# Patient Record
Sex: MALE | Race: WHITE | NOT HISPANIC OR LATINO | Employment: OTHER | URBAN - METROPOLITAN AREA
[De-identification: names, ages, dates, MRNs, and addresses within clinical notes are randomized per-mention and may not be internally consistent; named-entity substitution may affect disease eponyms.]

---

## 2017-01-10 ENCOUNTER — APPOINTMENT (OUTPATIENT)
Dept: LAB | Facility: CLINIC | Age: 67
End: 2017-01-10
Payer: MEDICARE

## 2017-01-10 DIAGNOSIS — E78.2 MIXED HYPERLIPIDEMIA: ICD-10-CM

## 2017-01-10 DIAGNOSIS — E11.65 TYPE 2 DIABETES MELLITUS WITH HYPERGLYCEMIA (HCC): ICD-10-CM

## 2017-01-10 LAB
ALBUMIN SERPL BCP-MCNC: 3.8 G/DL (ref 3.5–5)
ALP SERPL-CCNC: 81 U/L (ref 46–116)
ALT SERPL W P-5'-P-CCNC: 33 U/L (ref 12–78)
ANION GAP SERPL CALCULATED.3IONS-SCNC: 9 MMOL/L (ref 4–13)
AST SERPL W P-5'-P-CCNC: 23 U/L (ref 5–45)
BILIRUB SERPL-MCNC: 0.96 MG/DL (ref 0.2–1)
BUN SERPL-MCNC: 13 MG/DL (ref 5–25)
CALCIUM SERPL-MCNC: 9.2 MG/DL (ref 8.3–10.1)
CHLORIDE SERPL-SCNC: 105 MMOL/L (ref 100–108)
CHOLEST SERPL-MCNC: 199 MG/DL (ref 50–200)
CO2 SERPL-SCNC: 29 MMOL/L (ref 21–32)
CREAT SERPL-MCNC: 1.02 MG/DL (ref 0.6–1.3)
EST. AVERAGE GLUCOSE BLD GHB EST-MCNC: 131 MG/DL
GFR SERPL CREATININE-BSD FRML MDRD: >60 ML/MIN/1.73SQ M
GLUCOSE SERPL-MCNC: 121 MG/DL (ref 65–140)
HBA1C MFR BLD: 6.2 % (ref 4.2–6.3)
HDLC SERPL-MCNC: 52 MG/DL (ref 40–60)
LDLC SERPL CALC-MCNC: 114 MG/DL (ref 0–100)
POTASSIUM SERPL-SCNC: 3.7 MMOL/L (ref 3.5–5.3)
PROT SERPL-MCNC: 7.7 G/DL (ref 6.4–8.2)
SODIUM SERPL-SCNC: 143 MMOL/L (ref 136–145)
TRIGL SERPL-MCNC: 167 MG/DL

## 2017-01-10 PROCEDURE — 36415 COLL VENOUS BLD VENIPUNCTURE: CPT

## 2017-01-10 PROCEDURE — 80061 LIPID PANEL: CPT

## 2017-01-10 PROCEDURE — 80053 COMPREHEN METABOLIC PANEL: CPT

## 2017-01-10 PROCEDURE — 83036 HEMOGLOBIN GLYCOSYLATED A1C: CPT

## 2017-01-16 ENCOUNTER — ALLSCRIPTS OFFICE VISIT (OUTPATIENT)
Dept: OTHER | Facility: OTHER | Age: 67
End: 2017-01-16

## 2017-02-13 ENCOUNTER — GENERIC CONVERSION - ENCOUNTER (OUTPATIENT)
Dept: OTHER | Facility: OTHER | Age: 67
End: 2017-02-13

## 2017-02-13 LAB
LEFT EYE DIABETIC RETINOPATHY: NORMAL
RIGHT EYE DIABETIC RETINOPATHY: NORMAL

## 2017-04-17 ENCOUNTER — GENERIC CONVERSION - ENCOUNTER (OUTPATIENT)
Dept: OTHER | Facility: OTHER | Age: 67
End: 2017-04-17

## 2017-04-17 DIAGNOSIS — E78.2 MIXED HYPERLIPIDEMIA: ICD-10-CM

## 2017-04-19 ENCOUNTER — GENERIC CONVERSION - ENCOUNTER (OUTPATIENT)
Dept: OTHER | Facility: OTHER | Age: 67
End: 2017-04-19

## 2017-04-19 ENCOUNTER — APPOINTMENT (OUTPATIENT)
Dept: LAB | Facility: CLINIC | Age: 67
End: 2017-04-19
Payer: MEDICARE

## 2017-04-19 DIAGNOSIS — E78.2 MIXED HYPERLIPIDEMIA: ICD-10-CM

## 2017-04-19 LAB
ALBUMIN SERPL BCP-MCNC: 3.6 G/DL (ref 3.5–5)
ALP SERPL-CCNC: 71 U/L (ref 46–116)
ALT SERPL W P-5'-P-CCNC: 34 U/L (ref 12–78)
ANION GAP SERPL CALCULATED.3IONS-SCNC: 7 MMOL/L (ref 4–13)
AST SERPL W P-5'-P-CCNC: 23 U/L (ref 5–45)
BILIRUB SERPL-MCNC: 0.81 MG/DL (ref 0.2–1)
BUN SERPL-MCNC: 19 MG/DL (ref 5–25)
CALCIUM SERPL-MCNC: 8.9 MG/DL (ref 8.3–10.1)
CHLORIDE SERPL-SCNC: 103 MMOL/L (ref 100–108)
CHOLEST SERPL-MCNC: 167 MG/DL (ref 50–200)
CO2 SERPL-SCNC: 30 MMOL/L (ref 21–32)
CREAT SERPL-MCNC: 0.92 MG/DL (ref 0.6–1.3)
GFR SERPL CREATININE-BSD FRML MDRD: >60 ML/MIN/1.73SQ M
GLUCOSE P FAST SERPL-MCNC: 117 MG/DL (ref 65–99)
HDLC SERPL-MCNC: 53 MG/DL (ref 40–60)
LDLC SERPL CALC-MCNC: 88 MG/DL (ref 0–100)
POTASSIUM SERPL-SCNC: 3.9 MMOL/L (ref 3.5–5.3)
PROT SERPL-MCNC: 7.5 G/DL (ref 6.4–8.2)
SODIUM SERPL-SCNC: 140 MMOL/L (ref 136–145)
TRIGL SERPL-MCNC: 132 MG/DL

## 2017-04-19 PROCEDURE — 80061 LIPID PANEL: CPT

## 2017-04-19 PROCEDURE — 80053 COMPREHEN METABOLIC PANEL: CPT

## 2017-04-19 PROCEDURE — 36415 COLL VENOUS BLD VENIPUNCTURE: CPT

## 2017-07-04 DIAGNOSIS — E11.65 TYPE 2 DIABETES MELLITUS WITH HYPERGLYCEMIA (HCC): ICD-10-CM

## 2017-07-04 DIAGNOSIS — I10 ESSENTIAL (PRIMARY) HYPERTENSION: ICD-10-CM

## 2017-07-04 DIAGNOSIS — E66.01 MORBID (SEVERE) OBESITY DUE TO EXCESS CALORIES (HCC): ICD-10-CM

## 2017-07-04 DIAGNOSIS — E78.2 MIXED HYPERLIPIDEMIA: ICD-10-CM

## 2017-07-05 ENCOUNTER — APPOINTMENT (OUTPATIENT)
Dept: LAB | Facility: CLINIC | Age: 67
End: 2017-07-05
Payer: MEDICARE

## 2017-07-05 DIAGNOSIS — E11.65 TYPE 2 DIABETES MELLITUS WITH HYPERGLYCEMIA (HCC): ICD-10-CM

## 2017-07-05 DIAGNOSIS — E66.01 MORBID (SEVERE) OBESITY DUE TO EXCESS CALORIES (HCC): ICD-10-CM

## 2017-07-05 DIAGNOSIS — E78.2 MIXED HYPERLIPIDEMIA: ICD-10-CM

## 2017-07-05 DIAGNOSIS — I10 ESSENTIAL (PRIMARY) HYPERTENSION: ICD-10-CM

## 2017-07-05 LAB
ALBUMIN SERPL BCP-MCNC: 3.7 G/DL (ref 3.5–5)
ALP SERPL-CCNC: 78 U/L (ref 46–116)
ALT SERPL W P-5'-P-CCNC: 34 U/L (ref 12–78)
ANION GAP SERPL CALCULATED.3IONS-SCNC: 7 MMOL/L (ref 4–13)
AST SERPL W P-5'-P-CCNC: 26 U/L (ref 5–45)
BASOPHILS # BLD AUTO: 0.02 THOUSANDS/ΜL (ref 0–0.1)
BASOPHILS NFR BLD AUTO: 0 % (ref 0–1)
BILIRUB SERPL-MCNC: 1.12 MG/DL (ref 0.2–1)
BUN SERPL-MCNC: 15 MG/DL (ref 5–25)
CALCIUM SERPL-MCNC: 9 MG/DL (ref 8.3–10.1)
CHLORIDE SERPL-SCNC: 102 MMOL/L (ref 100–108)
CHOLEST SERPL-MCNC: 163 MG/DL (ref 50–200)
CO2 SERPL-SCNC: 29 MMOL/L (ref 21–32)
CREAT SERPL-MCNC: 0.96 MG/DL (ref 0.6–1.3)
CREAT UR-MCNC: 172 MG/DL
EOSINOPHIL # BLD AUTO: 0.61 THOUSAND/ΜL (ref 0–0.61)
EOSINOPHIL NFR BLD AUTO: 7 % (ref 0–6)
ERYTHROCYTE [DISTWIDTH] IN BLOOD BY AUTOMATED COUNT: 13.8 % (ref 11.6–15.1)
EST. AVERAGE GLUCOSE BLD GHB EST-MCNC: 146 MG/DL
GFR SERPL CREATININE-BSD FRML MDRD: >60 ML/MIN/1.73SQ M
GLUCOSE P FAST SERPL-MCNC: 105 MG/DL (ref 65–99)
HBA1C MFR BLD: 6.7 % (ref 4.2–6.3)
HCT VFR BLD AUTO: 45.6 % (ref 36.5–49.3)
HDLC SERPL-MCNC: 50 MG/DL (ref 40–60)
HGB BLD-MCNC: 14.9 G/DL (ref 12–17)
LDLC SERPL CALC-MCNC: 79 MG/DL (ref 0–100)
LYMPHOCYTES # BLD AUTO: 1.96 THOUSANDS/ΜL (ref 0.6–4.47)
LYMPHOCYTES NFR BLD AUTO: 22 % (ref 14–44)
MCH RBC QN AUTO: 29.4 PG (ref 26.8–34.3)
MCHC RBC AUTO-ENTMCNC: 32.7 G/DL (ref 31.4–37.4)
MCV RBC AUTO: 90 FL (ref 82–98)
MICROALBUMIN UR-MCNC: 11.1 MG/L (ref 0–20)
MICROALBUMIN/CREAT 24H UR: 6 MG/G CREATININE (ref 0–30)
MONOCYTES # BLD AUTO: 0.69 THOUSAND/ΜL (ref 0.17–1.22)
MONOCYTES NFR BLD AUTO: 8 % (ref 4–12)
NEUTROPHILS # BLD AUTO: 5.68 THOUSANDS/ΜL (ref 1.85–7.62)
NEUTS SEG NFR BLD AUTO: 63 % (ref 43–75)
NRBC BLD AUTO-RTO: 0 /100 WBCS
PLATELET # BLD AUTO: 229 THOUSANDS/UL (ref 149–390)
PMV BLD AUTO: 10.7 FL (ref 8.9–12.7)
POTASSIUM SERPL-SCNC: 3.8 MMOL/L (ref 3.5–5.3)
PROT SERPL-MCNC: 7.4 G/DL (ref 6.4–8.2)
RBC # BLD AUTO: 5.06 MILLION/UL (ref 3.88–5.62)
SODIUM SERPL-SCNC: 138 MMOL/L (ref 136–145)
TRIGL SERPL-MCNC: 172 MG/DL
WBC # BLD AUTO: 9 THOUSAND/UL (ref 4.31–10.16)

## 2017-07-05 PROCEDURE — 36415 COLL VENOUS BLD VENIPUNCTURE: CPT

## 2017-07-05 PROCEDURE — 80053 COMPREHEN METABOLIC PANEL: CPT

## 2017-07-05 PROCEDURE — 83036 HEMOGLOBIN GLYCOSYLATED A1C: CPT

## 2017-07-05 PROCEDURE — 82043 UR ALBUMIN QUANTITATIVE: CPT

## 2017-07-05 PROCEDURE — 82570 ASSAY OF URINE CREATININE: CPT

## 2017-07-05 PROCEDURE — 80061 LIPID PANEL: CPT

## 2017-07-05 PROCEDURE — 85025 COMPLETE CBC W/AUTO DIFF WBC: CPT

## 2017-07-17 ENCOUNTER — ALLSCRIPTS OFFICE VISIT (OUTPATIENT)
Dept: OTHER | Facility: OTHER | Age: 67
End: 2017-07-17

## 2017-08-11 ENCOUNTER — APPOINTMENT (OUTPATIENT)
Dept: LAB | Facility: CLINIC | Age: 67
End: 2017-08-11
Payer: MEDICARE

## 2017-08-11 ENCOUNTER — TRANSCRIBE ORDERS (OUTPATIENT)
Dept: LAB | Facility: CLINIC | Age: 67
End: 2017-08-11

## 2017-08-11 DIAGNOSIS — K76.0 FATTY METAMORPHOSIS OF LIVER: ICD-10-CM

## 2017-08-11 DIAGNOSIS — E55.9 UNSPECIFIED VITAMIN D DEFICIENCY: ICD-10-CM

## 2017-08-11 DIAGNOSIS — E55.9 UNSPECIFIED VITAMIN D DEFICIENCY: Primary | ICD-10-CM

## 2017-08-11 LAB
25(OH)D3 SERPL-MCNC: 16.1 NG/ML (ref 30–100)
ALBUMIN SERPL BCP-MCNC: 3.7 G/DL (ref 3.5–5)
ALP SERPL-CCNC: 76 U/L (ref 46–116)
ALT SERPL W P-5'-P-CCNC: 32 U/L (ref 12–78)
ANION GAP SERPL CALCULATED.3IONS-SCNC: 8 MMOL/L (ref 4–13)
AST SERPL W P-5'-P-CCNC: 24 U/L (ref 5–45)
BILIRUB SERPL-MCNC: 1.22 MG/DL (ref 0.2–1)
BUN SERPL-MCNC: 20 MG/DL (ref 5–25)
CALCIUM SERPL-MCNC: 9.4 MG/DL (ref 8.3–10.1)
CHLORIDE SERPL-SCNC: 101 MMOL/L (ref 100–108)
CO2 SERPL-SCNC: 29 MMOL/L (ref 21–32)
CREAT SERPL-MCNC: 1.06 MG/DL (ref 0.6–1.3)
GFR SERPL CREATININE-BSD FRML MDRD: 71 ML/MIN/1.73SQ M
GLUCOSE SERPL-MCNC: 118 MG/DL (ref 65–140)
POTASSIUM SERPL-SCNC: 3.8 MMOL/L (ref 3.5–5.3)
PROT SERPL-MCNC: 7.6 G/DL (ref 6.4–8.2)
SODIUM SERPL-SCNC: 138 MMOL/L (ref 136–145)

## 2017-08-11 PROCEDURE — 36415 COLL VENOUS BLD VENIPUNCTURE: CPT

## 2017-08-11 PROCEDURE — 82306 VITAMIN D 25 HYDROXY: CPT

## 2017-08-11 PROCEDURE — 80053 COMPREHEN METABOLIC PANEL: CPT

## 2017-08-15 ENCOUNTER — GENERIC CONVERSION - ENCOUNTER (OUTPATIENT)
Dept: OTHER | Facility: OTHER | Age: 67
End: 2017-08-15

## 2018-01-10 ENCOUNTER — TRANSCRIBE ORDERS (OUTPATIENT)
Dept: LAB | Facility: CLINIC | Age: 68
End: 2018-01-10

## 2018-01-10 ENCOUNTER — APPOINTMENT (OUTPATIENT)
Dept: LAB | Facility: CLINIC | Age: 68
End: 2018-01-10
Payer: MEDICARE

## 2018-01-10 DIAGNOSIS — IMO0001 UNCONTROLLED DIABETES MELLITUS TYPE 2 WITHOUT COMPLICATIONS, UNSPECIFIED LONG TERM INSULIN USE STATUS: ICD-10-CM

## 2018-01-10 DIAGNOSIS — E78.2 MIXED HYPERLIPIDEMIA: ICD-10-CM

## 2018-01-10 DIAGNOSIS — E66.01 MORBID OBESITY (HCC): ICD-10-CM

## 2018-01-10 DIAGNOSIS — I10 HYPERTENSION, ESSENTIAL: Primary | ICD-10-CM

## 2018-01-10 DIAGNOSIS — K76.0 FATTY METAMORPHOSIS OF LIVER: ICD-10-CM

## 2018-01-10 LAB
ALBUMIN SERPL BCP-MCNC: 3.6 G/DL (ref 3.5–5)
ALP SERPL-CCNC: 74 U/L (ref 46–116)
ALT SERPL W P-5'-P-CCNC: 31 U/L (ref 12–78)
ANION GAP SERPL CALCULATED.3IONS-SCNC: 9 MMOL/L (ref 4–13)
AST SERPL W P-5'-P-CCNC: 19 U/L (ref 5–45)
BILIRUB SERPL-MCNC: 0.85 MG/DL (ref 0.2–1)
BUN SERPL-MCNC: 26 MG/DL (ref 5–25)
CALCIUM SERPL-MCNC: 8.9 MG/DL (ref 8.3–10.1)
CHLORIDE SERPL-SCNC: 104 MMOL/L (ref 100–108)
CHOLEST SERPL-MCNC: 145 MG/DL (ref 50–200)
CO2 SERPL-SCNC: 27 MMOL/L (ref 21–32)
CREAT SERPL-MCNC: 1.12 MG/DL (ref 0.6–1.3)
CREAT UR-MCNC: 215 MG/DL
EST. AVERAGE GLUCOSE BLD GHB EST-MCNC: 154 MG/DL
GFR SERPL CREATININE-BSD FRML MDRD: 66 ML/MIN/1.73SQ M
GLUCOSE P FAST SERPL-MCNC: 119 MG/DL (ref 65–99)
HBA1C MFR BLD: 7 % (ref 4.2–6.3)
HDLC SERPL-MCNC: 52 MG/DL (ref 40–60)
LDLC SERPL CALC-MCNC: 71 MG/DL (ref 0–100)
MICROALBUMIN UR-MCNC: 10.1 MG/L (ref 0–20)
MICROALBUMIN/CREAT 24H UR: 5 MG/G CREATININE (ref 0–30)
POTASSIUM SERPL-SCNC: 3.6 MMOL/L (ref 3.5–5.3)
PROT SERPL-MCNC: 7.7 G/DL (ref 6.4–8.2)
SODIUM SERPL-SCNC: 140 MMOL/L (ref 136–145)
TRIGL SERPL-MCNC: 111 MG/DL
TSH SERPL DL<=0.05 MIU/L-ACNC: 0.9 UIU/ML (ref 0.36–3.74)

## 2018-01-10 PROCEDURE — 83036 HEMOGLOBIN GLYCOSYLATED A1C: CPT

## 2018-01-10 PROCEDURE — 84443 ASSAY THYROID STIM HORMONE: CPT

## 2018-01-10 PROCEDURE — 80061 LIPID PANEL: CPT

## 2018-01-10 PROCEDURE — 80053 COMPREHEN METABOLIC PANEL: CPT

## 2018-01-10 PROCEDURE — 82043 UR ALBUMIN QUANTITATIVE: CPT

## 2018-01-10 PROCEDURE — 82570 ASSAY OF URINE CREATININE: CPT

## 2018-01-10 PROCEDURE — 36415 COLL VENOUS BLD VENIPUNCTURE: CPT

## 2018-01-12 VITALS
HEIGHT: 68 IN | HEART RATE: 96 BPM | WEIGHT: 259.25 LBS | SYSTOLIC BLOOD PRESSURE: 130 MMHG | TEMPERATURE: 98.5 F | DIASTOLIC BLOOD PRESSURE: 80 MMHG | BODY MASS INDEX: 39.29 KG/M2 | RESPIRATION RATE: 16 BRPM

## 2018-01-14 VITALS
DIASTOLIC BLOOD PRESSURE: 84 MMHG | BODY MASS INDEX: 38.97 KG/M2 | HEIGHT: 68 IN | WEIGHT: 257.13 LBS | SYSTOLIC BLOOD PRESSURE: 134 MMHG | TEMPERATURE: 98.6 F | RESPIRATION RATE: 18 BRPM | HEART RATE: 88 BPM

## 2018-01-14 NOTE — RESULT NOTES
Message   Call patient  Fasting blood sugar 175  Test for diabetes Hemoglobin A1c is high at 9 7 ( N < 6 5)  Recommended to schedule appointment to discuss treatment for diabetes  Verified Results  (1) HEMOGLOBIN A1C [id number] 11:27AM Akosua Burns Order Number: [id number]   Order Number: [id number]     Test Name Result Flag Reference   HEMOGLOBIN A1C 9 7 % H 4 2-6 3   EST  AVG   GLUCOSE 232 mg/dl       (1) GLUCOSE,  FASTING [id number] 11:27AM Akosua Burns Order Number: [id number]   Order Number: [id number]     Test Name Result Flag Reference   GLUCOSE FASTING 175 mg/dL H 65-99

## 2018-01-16 ENCOUNTER — ALLSCRIPTS OFFICE VISIT (OUTPATIENT)
Dept: OTHER | Facility: OTHER | Age: 68
End: 2018-01-16

## 2018-01-16 NOTE — RESULT NOTES
Message   Call patient  Abdominal U/S showed fatty liver,  gallbladder polyp, simple L kidney cyst     Recommend to schedule gastroenterology evaluation by Dr Stanley Salgado  due to elevated liver enzymes, fatty liver  Mail order for GI consult to patient  Verified Results  * US ABDOMEN COMPLETE [id number] 08:27AM Rodrigo ALDANA Order Number: [id number]     Test Name Result Flag Reference   US ABDOMEN COMPLETE (Report)     ABDOMEN ULTRASOUND, COMPLETE     INDICATION: Abnormal serum enzymes  Hypertension  COMPARISON: None  TECHNIQUE:  Real-time ultrasound of the abdomen was performed with a curvilinear transducer with both volumetric sweeps and still imaging techniques  FINDINGS:   PANCREAS: Poorly visualized due to overlying bowel gas  Pancreatic body grossly unremarkable  AORTA AND IVC: Only segmentally visualized appearing grossly normal      LIVER:   Size: Within normal range  The liver measures 11 1 cm in the midclavicular line  Contour: Surface contour is smooth  Parenchyma: Parenchymal echotexture is heterogeneously elevated with diminished acoustic through transmission  No evidence of suspicious mass  The main portal vein is patent and hepatopetal       BILIARY:   The gallbladder is normal in caliber  No wall thickening or pericholecystic fluid  No stones identified  Layering echogenic bile/sludge  Nonmobile echogenic intraluminal focus measuring 5 mm, likely a polyp or possibly adherent congealed bile  Sonographic Junious Wixom sign is negative  No intrahepatic biliary dilatation  CBD measures 5 mm  No choledocholithiasis  KIDNEY:    Right kidney measures 11 7 x 5 2 cm  Within normal limits  Left kidney measures 10 8 x 6 1 cm  Interpolar cortical cyst measuring 1 6 cm  Within normal limits  SPLEEN:    Measures 13 x 13 3 x 4 2 cm  Within normal limits  ASCITES: None  IMPRESSION:   Fatty liver  5 mm Gallbladder polyp   Six-month follow-up advised  Simple left renal cyst    Borderline enlarged spleen  Workstation performed: [id number]     Signed by:    Jose Oconnor,    4/5/16       Plan  Elevated liver enzymes    · 2 - Carlos SHEPPARD, Carlos Betancourt  (Gastroenterology) Physician Referral  Consult  Status: Hold For -  Scheduling  Requested for: [facility] Summary provided  : Yes

## 2018-01-17 ENCOUNTER — TRANSCRIBE ORDERS (OUTPATIENT)
Dept: ADMINISTRATIVE | Facility: HOSPITAL | Age: 68
End: 2018-01-17

## 2018-01-17 DIAGNOSIS — I10 BENIGN ESSENTIAL HYPERTENSION: Primary | ICD-10-CM

## 2018-01-17 DIAGNOSIS — R06.02 EXERTIONAL SHORTNESS OF BREATH: ICD-10-CM

## 2018-01-17 DIAGNOSIS — R00.0 SINUS TACHYCARDIA: ICD-10-CM

## 2018-01-18 NOTE — RESULT NOTES
Verified Results  (1) LIPID PANEL, FASTING 19Apr2017 09:58AM Homero Walsh Order Number: [id number]     Test Name Result Flag Reference   CHOLESTEROL 167 mg/dL     HDL,DIRECT 53 mg/dL  40-60   Specimen collection should occur prior to Metamizole administration due to the potential for falsely depressed results  LDL CHOLESTEROL CALCULATED 88 mg/dL  0-100   - Patient Instructions: This is a fasting blood test  Water,black tea or black  coffee only after 9:00pm the night before test   Drink 2 glasses of water the morning of test       Triglyceride:         Normal              <150 mg/dl       Borderline High    150-199 mg/dl       High               200-499 mg/dl       Very High          >499 mg/dl  Cholesterol:         Desirable        <200 mg/dl      Borderline High  200-239 mg/dl      High             >239 mg/dl  HDL Cholesterol:        High    >59 mg/dL      Low     <41 mg/dL  LDL CALCULATED:    This screening LDL is a calculated result  It does not have the accuracy of the Direct Measured LDL in the monitoring of patients with hyperlipidemia and/or statin therapy  Direct Measure LDL (CMQ031) must be ordered separately in these patients  TRIGLYCERIDES 132 mg/dL  <=150   Specimen collection should occur prior to N-Acetylcysteine or Metamizole administration due to the potential for falsely depressed results         Signatures  Electronically signed by : JERRI Carrillo ; Apr 19 2017  1:08PM EST                       (Author)

## 2018-01-22 VITALS
RESPIRATION RATE: 16 BRPM | BODY MASS INDEX: 39.71 KG/M2 | HEIGHT: 68 IN | DIASTOLIC BLOOD PRESSURE: 90 MMHG | SYSTOLIC BLOOD PRESSURE: 140 MMHG | TEMPERATURE: 97.7 F | OXYGEN SATURATION: 94 % | HEART RATE: 100 BPM | WEIGHT: 262 LBS

## 2018-02-06 ENCOUNTER — OFFICE VISIT (OUTPATIENT)
Dept: CARDIOLOGY CLINIC | Facility: CLINIC | Age: 68
End: 2018-02-06
Payer: MEDICARE

## 2018-02-06 VITALS
HEIGHT: 70 IN | DIASTOLIC BLOOD PRESSURE: 80 MMHG | BODY MASS INDEX: 37.22 KG/M2 | HEART RATE: 84 BPM | SYSTOLIC BLOOD PRESSURE: 142 MMHG | WEIGHT: 260 LBS

## 2018-02-06 DIAGNOSIS — E78.00 HYPERCHOLESTEROLEMIA: ICD-10-CM

## 2018-02-06 DIAGNOSIS — R94.31 ABNORMAL EKG: Primary | ICD-10-CM

## 2018-02-06 DIAGNOSIS — R00.0 SINUS TACHYCARDIA: ICD-10-CM

## 2018-02-06 DIAGNOSIS — I10 HYPERTENSION, ESSENTIAL, BENIGN: ICD-10-CM

## 2018-02-06 DIAGNOSIS — I10 BENIGN ESSENTIAL HYPERTENSION: ICD-10-CM

## 2018-02-06 DIAGNOSIS — I49.3 PVC (PREMATURE VENTRICULAR CONTRACTION): ICD-10-CM

## 2018-02-06 DIAGNOSIS — R06.02 EXERTIONAL SHORTNESS OF BREATH: ICD-10-CM

## 2018-02-06 PROCEDURE — 99203 OFFICE O/P NEW LOW 30 MIN: CPT | Performed by: INTERNAL MEDICINE

## 2018-02-06 RX ORDER — LATANOPROST 50 UG/ML
SOLUTION/ DROPS OPHTHALMIC
COMMUNITY
Start: 2016-06-10

## 2018-02-06 RX ORDER — ASPIRIN 325 MG
1 TABLET ORAL DAILY
COMMUNITY
Start: 2017-07-17

## 2018-02-06 RX ORDER — VITAMIN E 268 MG
CAPSULE ORAL DAILY
COMMUNITY
Start: 2016-06-10

## 2018-02-06 RX ORDER — CHLORAL HYDRATE 500 MG
CAPSULE ORAL 2 TIMES DAILY
COMMUNITY
Start: 2016-04-01

## 2018-02-06 RX ORDER — AMLODIPINE BESYLATE 5 MG/1
1 TABLET ORAL DAILY
COMMUNITY
Start: 2016-04-01 | End: 2018-03-16 | Stop reason: SDUPTHER

## 2018-02-06 RX ORDER — ATORVASTATIN CALCIUM 20 MG/1
1 TABLET, FILM COATED ORAL DAILY
COMMUNITY
Start: 2017-01-27 | End: 2018-03-19 | Stop reason: SDUPTHER

## 2018-02-06 RX ORDER — LISINOPRIL AND HYDROCHLOROTHIAZIDE 25; 20 MG/1; MG/1
1 TABLET ORAL DAILY
COMMUNITY
Start: 2016-05-27 | End: 2019-01-18 | Stop reason: SDUPTHER

## 2018-02-06 NOTE — PROGRESS NOTES
Cardiology Follow Up    Germaine Lassiter  1950  [id number]  [address] [facility]  [address] [place] [address] [address]    1  Abnormal EKG  Echo complete with contrast if indicated    NM myocardial perfusion spect (rx stress and/or rest)   2  Hypertension, essential, benign     3  Hypercholesterolemia  NM myocardial perfusion spect (rx stress and/or rest)   4  PVC (premature ventricular contraction)  Echo complete with contrast if indicated   5  Benign essential hypertension  Ambulatory referral to Cardiology   6  Exertional shortness of breath  Ambulatory referral to Cardiology    Echo complete with contrast if indicated    NM myocardial perfusion spect (rx stress and/or rest)   7  Sinus tachycardia  Ambulatory referral to Cardiology    Echo complete with contrast if indicated       Interval History:  Cardiology consultation, kindly referred by primary physician for evaluation of dyspnea  Pleasant 75-year-old male who has no previous cardiac history  The patient appears to be minimizing some of his symptoms, but he has been getting dyspneic with mild to moderate efforts  Although he feels that he is not limited  He has had no chest discomfort  He denies any orthopnea or PND  He has had diabetes for couple years as well as longstanding hypertension on medications mostly well controlled  Recent evaluation revealed that he was somewhat tachycardia, beta-blocker was added to his medical regimen  The patient admits to be deconditioned  He is obese as well and he does not exercise regularly  An EKG was performed, personally reviewed, rhythm was sinus tachycardia, with premature ventricular contractions noted  There was left axis deviation versus inferior infarct, and diffuse nonspecific ST segment changes noted      Patient Active Problem List   Diagnosis    Abnormal EKG    Hypertension, essential, benign    Hypercholesterolemia    PVC (premature ventricular contraction)     No past medical history on file  Social History     Social History    Marital status: /Civil Union     Spouse name: N/A    Number of children: N/A    Years of education: N/A     Occupational History    Not on file  Social History Main Topics    Smoking status: Never Smoker    Smokeless tobacco: Never Used    Alcohol use Not on file    Drug use: Unknown    Sexual activity: Not on file     Other Topics Concern    Not on file     Social History Narrative    No narrative on file      No family history on file  No past surgical history on file  Current Outpatient Prescriptions:     amLODIPine (NORVASC) 5 mg tablet, Take 1 tablet by mouth daily, Disp: , Rfl:     aspirin 325 mg tablet, Take 1 tablet by mouth every 4 (four) hours as needed, Disp: , Rfl:     atorvastatin (LIPITOR) 20 mg tablet, Take 1 tablet by mouth daily, Disp: , Rfl:     cholecalciferol (VITAMIN D3) 1,000 units tablet, Take 1 tablet by mouth daily, Disp: , Rfl:     latanoprost (XALATAN) 0 005 % ophthalmic solution, Apply to eye, Disp: , Rfl:     lisinopril-hydrochlorothiazide (PRINZIDE,ZESTORETIC) 20-25 MG per tablet, Take 1 tablet by mouth daily, Disp: , Rfl:     metFORMIN (GLUCOPHAGE) 1000 MG tablet, Take 1 tablet by mouth 2 (two) times a day, Disp: , Rfl:     metoprolol tartrate (LOPRESSOR) 25 mg tablet, Take 0 5 tablets by mouth 2 (two) times a day, Disp: , Rfl:     Multiple Vitamins-Minerals (MULTIVITAL-M) TABS, Take 1 tablet by mouth daily, Disp: , Rfl:     Omega-3 Fatty Acids (FISH OIL) 1,000 mg, Take by mouth 2 (two) times a day, Disp: , Rfl:     vitamin E, tocopherol, 400 units capsule, Take by mouth daily, Disp: , Rfl:   No Known Allergies    Labs:  Transcribe Orders on 01/10/2018   Component Date Value    Creatinine, Ur 01/10/2018 215 0     Microalbum  ,U,Random 01/10/2018 10 1     Microalb Creat Ratio 01/10/2018 5     Sodium 01/10/2018 140     Potassium 01/10/2018 3 6     Chloride 01/10/2018 104     CO2 01/10/2018 27     Anion Gap 01/10/2018 9     BUN 01/10/2018 26*    Creatinine 01/10/2018 1 12     Glucose, Fasting 01/10/2018 119*    Calcium 01/10/2018 8 9     AST 01/10/2018 19     ALT 01/10/2018 31     Alkaline Phosphatase 01/10/2018 74     Total Protein 01/10/2018 7 7     Albumin 01/10/2018 3 6     Total Bilirubin 01/10/2018 0 85     eGFR 01/10/2018 66     TSH 3RD GENERATON 01/10/2018 0 903     Cholesterol 01/10/2018 145     Triglycerides 01/10/2018 111     HDL, Direct 01/10/2018 52     LDL Calculated 01/10/2018 71     Hemoglobin A1C 01/10/2018 7 0*    EAG 01/10/2018 154    Appointment on 08/11/2017   Component Date Value    Sodium 08/11/2017 138     Potassium 08/11/2017 3 8     Chloride 08/11/2017 101     CO2 08/11/2017 29     Anion Gap 08/11/2017 8     BUN 08/11/2017 20     Creatinine 08/11/2017 1 06     Glucose 08/11/2017 118     Calcium 08/11/2017 9 4     AST 08/11/2017 24     ALT 08/11/2017 32     Alkaline Phosphatase 08/11/2017 76     Total Protein 08/11/2017 7 6     Albumin 08/11/2017 3 7     Total Bilirubin 08/11/2017 1 22*    eGFR 08/11/2017 71     Vit D, 25-Hydroxy 08/11/2017 16 1*     Imaging: No results found  Review of Systems:  Review of Systems   Constitutional: Negative for activity change, appetite change, chills, diaphoresis, fever and unexpected weight change  HENT: Negative for hearing loss, nosebleeds and trouble swallowing  Eyes: Positive for visual disturbance  Respiratory: Positive for cough and shortness of breath  Negative for apnea, choking, chest tightness, wheezing and stridor  Cardiovascular: Negative for chest pain, palpitations and leg swelling  Gastrointestinal: Negative for abdominal distention, abdominal pain, anal bleeding, blood in stool, constipation, diarrhea, nausea, rectal pain and vomiting     Endocrine: Negative for cold intolerance and heat intolerance  Genitourinary: Negative for dysuria, flank pain, frequency and hematuria  Musculoskeletal: Positive for arthralgias and back pain  Negative for gait problem, joint swelling, myalgias and neck pain  Skin: Negative for color change, pallor, rash and wound  Allergic/Immunologic: Negative for environmental allergies, food allergies and immunocompromised state  Neurological: Negative for dizziness, tremors, seizures, syncope, facial asymmetry, speech difficulty, weakness, light-headedness, numbness and headaches  Hematological: Does not bruise/bleed easily  Psychiatric/Behavioral: Negative for agitation, behavioral problems and confusion  Physical Exam:  Physical Exam   Constitutional: He is oriented to person, place, and time  He appears well-developed  No distress  Obese   HENT:   Head: Normocephalic  Eyes: Pupils are equal, round, and reactive to light  No scleral icterus  Neck: No JVD present  No tracheal deviation present  No thyromegaly present  Cardiovascular: Normal rate, regular rhythm, normal heart sounds and intact distal pulses  Exam reveals no gallop and no friction rub  No murmur heard  Pulmonary/Chest: Effort normal and breath sounds normal  No stridor  No respiratory distress  He has no wheezes  He has no rales  He exhibits no tenderness  Abdominal: Soft  Bowel sounds are normal  He exhibits no distension and no mass  There is no tenderness  There is no rebound and no guarding  Neurological: He is alert and oriented to person, place, and time  Skin: Skin is warm and dry  No rash noted  He is not diaphoretic  No erythema  No pallor  Psychiatric: He has a normal mood and affect  Judgment normal        Discussion/Summary:  Exertional dyspnea with abnormal EKG, possibly deconditioning  Favor noninvasive evaluation  Will do a stress test and an echocardiogram   Further recommendations pending results of the testing

## 2018-02-15 LAB
LEFT EYE DIABETIC RETINOPATHY: NORMAL
RIGHT EYE DIABETIC RETINOPATHY: NORMAL

## 2018-03-16 ENCOUNTER — TELEPHONE (OUTPATIENT)
Dept: FAMILY MEDICINE CLINIC | Facility: CLINIC | Age: 68
End: 2018-03-16

## 2018-03-16 DIAGNOSIS — I10 ESSENTIAL HYPERTENSION: Primary | ICD-10-CM

## 2018-03-16 RX ORDER — AMLODIPINE BESYLATE 5 MG/1
5 TABLET ORAL DAILY
Qty: 90 TABLET | Refills: 3 | Status: SHIPPED | OUTPATIENT
Start: 2018-03-16 | End: 2019-04-29 | Stop reason: SDUPTHER

## 2018-03-19 DIAGNOSIS — E78.2 MIXED HYPERLIPIDEMIA: Primary | ICD-10-CM

## 2018-03-19 RX ORDER — ATORVASTATIN CALCIUM 20 MG/1
TABLET, FILM COATED ORAL
Qty: 90 TABLET | Refills: 3 | Status: SHIPPED | OUTPATIENT
Start: 2018-03-19 | End: 2019-03-12 | Stop reason: SDUPTHER

## 2018-04-05 ENCOUNTER — HOSPITAL ENCOUNTER (OUTPATIENT)
Dept: NON INVASIVE DIAGNOSTICS | Facility: CLINIC | Age: 68
Discharge: HOME | End: 2018-04-05
Payer: MEDICARE

## 2018-04-05 DIAGNOSIS — I49.3 PVC (PREMATURE VENTRICULAR CONTRACTION): ICD-10-CM

## 2018-04-05 DIAGNOSIS — R06.02 EXERTIONAL SHORTNESS OF BREATH: ICD-10-CM

## 2018-04-05 DIAGNOSIS — R94.31 ABNORMAL EKG: ICD-10-CM

## 2018-04-05 DIAGNOSIS — R00.0 SINUS TACHYCARDIA: ICD-10-CM

## 2018-04-05 DIAGNOSIS — E78.00 HYPERCHOLESTEROLEMIA: ICD-10-CM

## 2018-04-05 LAB
ARRHY DURING EX: NORMAL
CHEST PAIN STATEMENT: NORMAL
MAX DIASTOLIC BP: 88 MMHG
MAX HEART RATE: 134 BPM
MAX PREDICTED HEART RATE: 150 BPM
MAX. SYSTOLIC BP: 160 MMHG
PROTOCOL NAME: NORMAL
REASON FOR TERMINATION: NORMAL
TARGET HR FORMULA: NORMAL
TEST INDICATION: NORMAL
TIME IN EXERCISE PHASE: NORMAL

## 2018-04-05 PROCEDURE — 93017 CV STRESS TEST TRACING ONLY: CPT

## 2018-04-05 PROCEDURE — 93306 TTE W/DOPPLER COMPLETE: CPT | Performed by: INTERNAL MEDICINE

## 2018-04-05 PROCEDURE — 93306 TTE W/DOPPLER COMPLETE: CPT

## 2018-04-05 PROCEDURE — A9502 TC99M TETROFOSMIN: HCPCS

## 2018-04-05 PROCEDURE — 78452 HT MUSCLE IMAGE SPECT MULT: CPT

## 2018-04-30 DIAGNOSIS — E11.65 TYPE 2 DIABETES MELLITUS WITH HYPERGLYCEMIA, WITHOUT LONG-TERM CURRENT USE OF INSULIN (HCC): Primary | ICD-10-CM

## 2018-06-12 DIAGNOSIS — E11.65 TYPE 2 DIABETES MELLITUS WITH HYPERGLYCEMIA, WITHOUT LONG-TERM CURRENT USE OF INSULIN (HCC): ICD-10-CM

## 2018-06-12 RX ORDER — LANCETS
EACH MISCELLANEOUS 3 TIMES DAILY
Qty: 300 EACH | Refills: 3 | Status: SHIPPED | OUTPATIENT
Start: 2018-06-12 | End: 2021-10-05

## 2018-06-12 NOTE — TELEPHONE ENCOUNTER
Patient needs a refill of metFORMIN 1000m 180 tabs and 1 touch ultra blue test strips -[facility] to the [address] in [facility]  Patient can be contacted at [phone]

## 2018-07-05 DIAGNOSIS — E78.2 MIXED HYPERLIPIDEMIA: ICD-10-CM

## 2018-07-05 DIAGNOSIS — E11.65 TYPE 2 DIABETES MELLITUS WITH HYPERGLYCEMIA (HCC): ICD-10-CM

## 2018-07-05 DIAGNOSIS — I10 ESSENTIAL (PRIMARY) HYPERTENSION: ICD-10-CM

## 2018-07-05 DIAGNOSIS — E66.01 MORBID (SEVERE) OBESITY DUE TO EXCESS CALORIES (HCC): ICD-10-CM

## 2018-07-13 ENCOUNTER — APPOINTMENT (OUTPATIENT)
Dept: LAB | Facility: CLINIC | Age: 68
End: 2018-07-13
Payer: MEDICARE

## 2018-07-13 DIAGNOSIS — E11.65 TYPE 2 DIABETES MELLITUS WITH HYPERGLYCEMIA (HCC): ICD-10-CM

## 2018-07-13 DIAGNOSIS — I10 ESSENTIAL (PRIMARY) HYPERTENSION: ICD-10-CM

## 2018-07-13 DIAGNOSIS — E66.01 MORBID (SEVERE) OBESITY DUE TO EXCESS CALORIES (HCC): ICD-10-CM

## 2018-07-13 DIAGNOSIS — E78.2 MIXED HYPERLIPIDEMIA: ICD-10-CM

## 2018-07-13 LAB
ALBUMIN SERPL BCP-MCNC: 3.8 G/DL (ref 3.5–5)
ALP SERPL-CCNC: 85 U/L (ref 46–116)
ALT SERPL W P-5'-P-CCNC: 41 U/L (ref 12–78)
ANION GAP SERPL CALCULATED.3IONS-SCNC: 6 MMOL/L (ref 4–13)
AST SERPL W P-5'-P-CCNC: 30 U/L (ref 5–45)
BILIRUB SERPL-MCNC: 1.29 MG/DL (ref 0.2–1)
BUN SERPL-MCNC: 21 MG/DL (ref 5–25)
CALCIUM SERPL-MCNC: 9.3 MG/DL (ref 8.3–10.1)
CHLORIDE SERPL-SCNC: 101 MMOL/L (ref 100–108)
CHOLEST SERPL-MCNC: 149 MG/DL (ref 50–200)
CO2 SERPL-SCNC: 27 MMOL/L (ref 21–32)
CREAT SERPL-MCNC: 1.17 MG/DL (ref 0.6–1.3)
CREAT UR-MCNC: 159 MG/DL
EST. AVERAGE GLUCOSE BLD GHB EST-MCNC: 151 MG/DL
GFR SERPL CREATININE-BSD FRML MDRD: 63 ML/MIN/1.73SQ M
GLUCOSE P FAST SERPL-MCNC: 120 MG/DL (ref 65–99)
HBA1C MFR BLD: 6.9 % (ref 4.2–6.3)
HDLC SERPL-MCNC: 39 MG/DL (ref 40–60)
LDLC SERPL CALC-MCNC: 68 MG/DL (ref 0–100)
MICROALBUMIN UR-MCNC: 13.4 MG/L (ref 0–20)
MICROALBUMIN/CREAT 24H UR: 8 MG/G CREATININE (ref 0–30)
NONHDLC SERPL-MCNC: 110 MG/DL
POTASSIUM SERPL-SCNC: 3.5 MMOL/L (ref 3.5–5.3)
PROT SERPL-MCNC: 7.9 G/DL (ref 6.4–8.2)
SODIUM SERPL-SCNC: 134 MMOL/L (ref 136–145)
TRIGL SERPL-MCNC: 208 MG/DL

## 2018-07-13 PROCEDURE — 80061 LIPID PANEL: CPT

## 2018-07-13 PROCEDURE — 83036 HEMOGLOBIN GLYCOSYLATED A1C: CPT

## 2018-07-13 PROCEDURE — 82570 ASSAY OF URINE CREATININE: CPT

## 2018-07-13 PROCEDURE — 80053 COMPREHEN METABOLIC PANEL: CPT

## 2018-07-13 PROCEDURE — 82043 UR ALBUMIN QUANTITATIVE: CPT

## 2018-07-13 PROCEDURE — 36415 COLL VENOUS BLD VENIPUNCTURE: CPT

## 2018-07-15 PROBLEM — E55.9 VITAMIN D DEFICIENCY: Status: ACTIVE | Noted: 2018-01-16

## 2018-07-15 PROBLEM — R06.02 EXERTIONAL SHORTNESS OF BREATH: Status: ACTIVE | Noted: 2018-01-16

## 2018-07-17 ENCOUNTER — HOSPITAL ENCOUNTER (OUTPATIENT)
Dept: RADIOLOGY | Facility: HOSPITAL | Age: 68
Discharge: HOME | End: 2018-07-17
Payer: MEDICARE

## 2018-07-17 ENCOUNTER — TRANSCRIBE ORDERS (OUTPATIENT)
Dept: ADMINISTRATIVE | Facility: HOSPITAL | Age: 68
End: 2018-07-17

## 2018-07-17 ENCOUNTER — OFFICE VISIT (OUTPATIENT)
Dept: FAMILY MEDICINE CLINIC | Facility: CLINIC | Age: 68
End: 2018-07-17
Payer: MEDICARE

## 2018-07-17 VITALS
RESPIRATION RATE: 18 BRPM | HEART RATE: 92 BPM | WEIGHT: 256 LBS | OXYGEN SATURATION: 91 % | DIASTOLIC BLOOD PRESSURE: 74 MMHG | HEIGHT: 67 IN | TEMPERATURE: 98.1 F | BODY MASS INDEX: 40.18 KG/M2 | SYSTOLIC BLOOD PRESSURE: 128 MMHG

## 2018-07-17 DIAGNOSIS — K76.0 FATTY LIVER: ICD-10-CM

## 2018-07-17 DIAGNOSIS — Z12.11 SCREENING FOR COLON CANCER: ICD-10-CM

## 2018-07-17 DIAGNOSIS — R06.02 EXERTIONAL SHORTNESS OF BREATH: ICD-10-CM

## 2018-07-17 DIAGNOSIS — E11.65 TYPE 2 DIABETES MELLITUS WITH HYPERGLYCEMIA, WITHOUT LONG-TERM CURRENT USE OF INSULIN (HCC): ICD-10-CM

## 2018-07-17 DIAGNOSIS — Z00.00 MEDICARE ANNUAL WELLNESS VISIT, SUBSEQUENT: ICD-10-CM

## 2018-07-17 DIAGNOSIS — E78.2 MIXED HYPERLIPIDEMIA: ICD-10-CM

## 2018-07-17 DIAGNOSIS — R09.02 HYPOXIA: ICD-10-CM

## 2018-07-17 DIAGNOSIS — R09.02 HYPOXEMIA: Primary | ICD-10-CM

## 2018-07-17 DIAGNOSIS — I10 BENIGN ESSENTIAL HYPERTENSION: Primary | ICD-10-CM

## 2018-07-17 DIAGNOSIS — E66.01 MORBID OBESITY DUE TO EXCESS CALORIES (HCC): ICD-10-CM

## 2018-07-17 PROCEDURE — G0439 PPPS, SUBSEQ VISIT: HCPCS | Performed by: FAMILY MEDICINE

## 2018-07-17 PROCEDURE — 71275 CT ANGIOGRAPHY CHEST: CPT

## 2018-07-17 PROCEDURE — 99214 OFFICE O/P EST MOD 30 MIN: CPT | Performed by: FAMILY MEDICINE

## 2018-07-17 RX ADMIN — IOHEXOL 89 ML: 350 INJECTION, SOLUTION INTRAVENOUS at 17:49

## 2018-07-17 NOTE — PROGRESS NOTES
Chief Complaint   Patient presents with   [facility] Wellness Visit     Annual wellness visit   Follow-up     6 month follow up  Health Maintenance   Topic Date Due    Medicare Annual Wellness Visit (AWV)  1950    CRC Screening: Colonoscopy  1950    DTaP,Tdap,and Td Vaccines (1 - Tdap) 01/10/1969    PNEUMOCOCCAL POLYSACCHARIDE VACCINE AGE 72 AND OVER  01/10/2013    Diabetic Foot Exam  07/17/2018    INFLUENZA VACCINE  09/01/2018    HEMOGLOBIN A1C  01/13/2019    GLAUCOMA SCREENING 65 + YR  02/15/2019    DM Eye Exam  02/15/2019    URINE MICROALBUMIN  07/13/2019    Fall Risk  07/17/2019    Depression Screening PHQ-9  07/17/2019    Hepatitis C Screening  Completed     Assessment/Plan:    Benign essential hypertension  Blood pressure is stable  Continue Lisinopril/HCTZ 20/25 mg one tablet daily, Amlodipine 5 mg daily  Follow a low-sodium diet  Consider changing ACE  to  ARB due to dry cough  Type 2 diabetes mellitus with hyperglycemia (HCC)  Lab Results   Component Value Date    HGBA1C 6 9 (H) 07/13/2018       No results for input(s): POCGLU in the last 72 hours  Blood Sugar Average: Last 72 hrs:    Continue Metformin 1000 mg 1 tablet twice daily  Patient will check with pharmacist if he can change his glucometer due to giving high blood sugar readings at home  Check eye exam by ophthalmologist Theresa Fraser annually  Recommended podiatry evaluation  Exertional shortness of breath  Patient was seen by cardiologist Dr Magdiel Santa  in February 2018  Nuclear stress test performed in April 2018 was negative for cardiac ischemia  Patient c/o dry cough  Pulse ox is 90-91 % at rest   Will order CTA of the chest to rule out pulmonary embolism, lung pathology  Consider referral to pulmonology for further evaluation  Mixed hyperlipidemia  Continue Atorvastatin 20 mg daily  Recommended to follow a low cholesterol, low-fat diet, regular exercise       Fatty liver  Take Vit E 400 IU daily  Follow a low fat diet, lose weight  Follow -up with gastroenterologist Dr Lerma Son  Morbid obesity due to excess calories ([facility] [address] )  Encouraged weight reduction  HM: patient refusing colonoscopy  Recommended Tdap vaccination  Discussed a new vaccine for shingles  Schedule follow-up office visit in 6 months  Check labs prior to next visit  Diagnoses and all orders for this visit:    Benign essential hypertension  -     Comprehensive metabolic panel; Future    Type 2 diabetes mellitus with hyperglycemia, without long-term current use of insulin (HCC)  -     Comprehensive metabolic panel; Future  -     Hemoglobin A1C; Future    Exertional shortness of breath  -     CTA chest pe study; Future    Hypoxia  -     CTA chest pe study; Future    Mixed hyperlipidemia  -     Lipid panel; Future    Fatty liver    Screening for colon cancer  -     Ambulatory referral to Gastroenterology; Future    Morbid obesity due to excess calories ([facility] )    Medicare annual wellness visit, subsequent          Subjective:      Patient ID: Doug Hodges is a 77 y o  male  HPI     Patient presents for 6 month follow-up visit for HTN, Type 2 DM, Hyperlipidemia, Morbid obesity, fatty liver  Reviewed current medications, blood work results from  7/13/18  Hb A1c 6 9, fasting blood sugar 120, potassium 3 5, cholesterol 149, triglycerides 208, HDL 39, LDL 68  Patient was seen by cardiologist Dr Sonya Munguia in 2/18  for evaluation of exertional shortness of breath  Nuclear stress test done in April 2018 was negative for cardiac ischemia  Patient still c/o some exertional shortness of breath, dry   cough for the past 4 weeks  Pulse ox on room air at rest 90-91 %  Denies fever, chills, night sweats  Denies tobacco use  Type 2 DM - currently taking Metformin 1000 mg twice daily with meals  He checks blood sugar at home 2-3  times per week   Patient is not sure if his glucometer working properly because he gets blood sugar readings in 170 -  180's  Hemoglobin A1c 6 9 with recent blood test     Hyperlipidemia -currently taking Atorvastatin 20 mg daily  Denies side effects from statin therapy  HTN - patient takes Lisinopril/HCTZ 20/25 mg 1 tablet daily, Amlodipine 5 mg daily  Blood pressure remained stable  Denies chest pain, dizziness, heart palpitations  Patient has been followed by gastroenterologist   Dr Carlota Goodman every 6 months for fatty liver  He takes Vit E 400 IU daily  Patient tries to eat healthy, lost 6 lb since last visit in 1/18  He does not exercise on a regular basis  Patient states that he had eye exam done by   Dr Theresa Fraser this year  Patient has not been seen by podiatrist   C/o occasional numbness  in both feet  No balance problems  No falls  Refusing colonoscopy  Prevnar 13 done in 12/15  The following portions of the patient's history were reviewed and updated as appropriate: current medications, past family history, past medical history, past social history, past surgical history and problem list     Review of Systems   Constitutional: Negative for activity change, appetite change, chills, fatigue and fever  HENT: Negative for congestion, ear pain, hearing loss, sore throat, tinnitus and trouble swallowing  Eyes: Negative for pain, discharge, redness, itching and visual disturbance  Respiratory: Positive for cough (dry cough) and shortness of breath (exertional SOB)  Negative for chest tightness and wheezing  Cardiovascular: Negative for chest pain, palpitations and leg swelling  Gastrointestinal: Negative for abdominal pain, blood in stool, constipation, diarrhea, nausea and vomiting  Genitourinary: Negative for difficulty urinating, dysuria, flank pain and hematuria  Nocturia   Musculoskeletal: Negative for arthralgias, gait problem, joint swelling and myalgias  Skin: Negative for rash and wound     Neurological: Positive for numbness (in feet)  Negative for dizziness, syncope and headaches  Hematological: Negative  Psychiatric/Behavioral: Negative  Objective:      /74 (BP Location: Left arm, Patient Position: Sitting, Cuff Size: Large)   Pulse 92   Temp 98 1 °F (36 7 °C) (Tympanic)   Resp 18   Ht 5' 7" (1 702 m)   Wt 116 kg (256 lb)   SpO2 91%   BMI 40 10 kg/m²          Physical Exam   Constitutional: He is oriented to person, place, and time  He appears well-developed and well-nourished  Morbidly obese   HENT:   Head: Normocephalic and atraumatic  Right Ear: External ear normal    Left Ear: External ear normal    Mouth/Throat: Oropharynx is clear and moist    Eyes: Conjunctivae are normal  Pupils are equal, round, and reactive to light  Neck: Normal range of motion  Neck supple  No JVD present  Cardiovascular: Normal rate, regular rhythm and normal heart sounds  No murmur heard  Sinus tachycardia  No carotid bruits BL  No BL LE edema   Pulmonary/Chest: Effort normal  No respiratory distress  He has no wheezes  Few crackles at R lower lung field   Abdominal: Soft  Bowel sounds are normal  There is no tenderness  Musculoskeletal: Normal range of motion  He exhibits no edema, tenderness or deformity  Neurological: He is alert and oriented to person, place, and time  Coordination normal    Skin: Skin is warm and dry  Psychiatric: He has a normal mood and affect  His behavior is normal    Nursing note and vitals reviewed

## 2018-07-17 NOTE — PROGRESS NOTES
Chief Complaint   Patient presents with   [facility] Wellness Visit     Annual wellness visit   Follow-up     6 month follow up  Assessment and Plan:  Problem List Items Addressed This Visit        Digestive    Fatty liver       Endocrine    Type 2 diabetes mellitus with hyperglycemia ([address] )       Cardiovascular and Mediastinum    Benign essential hypertension - Primary       Other    Mixed hyperlipidemia    Morbid obesity due to excess calories [facility])      Other Visit Diagnoses     Screening for colon cancer            Health Maintenance Due   Topic Date Due    Medicare Annual Wellness Visit (AWV)  1950    CRC Screening: Colonoscopy  1950    DTaP,Tdap,and Td Vaccines (1 - Tdap) 01/10/1969    Fall Risk  01/10/2013    PNEUMOCOCCAL POLYSACCHARIDE VACCINE AGE 72 AND OVER  01/10/2013    Diabetic Foot Exam  07/17/2018         HPI:  Todd Tong is a 77 y o  male here for his Subsequent Wellness Visit      Patient Active Problem List   Diagnosis    Abnormal EKG    Benign essential hypertension    Hypercholesterolemia    PVC (premature ventricular contraction)    Elevated liver enzymes    Exertional shortness of breath    Fatty liver    Glaucoma    Mixed hyperlipidemia    Morbid obesity due to excess calories (HCC)    Sinus tachycardia    Type 2 diabetes mellitus with hyperglycemia (HCC)    Vitamin D deficiency     Past Medical History:   Diagnosis Date    Cataract, left     last assessed [id number]    Epistaxis     last assessed [id number]    Exertional shortness of breath     last assessed 16jan2018    IFG (impaired fasting glucose)     last assessed 01Apr2016     Past Surgical History:   Procedure Laterality Date    CATARACT EXTRACTION Left 07/01/2016    L eye cataract surgery - 7/16     Family History   Problem Relation Age of Onset    Stomach cancer Father      History   Smoking Status    Never Smoker   Smokeless Tobacco    Never Used     History   Alcohol Use No      History Drug use: Unknown         Current Outpatient Prescriptions   Medication Sig Dispense Refill    amLODIPine (NORVASC) 5 mg tablet Take 1 tablet (5 mg total) by mouth daily 90 tablet 3    aspirin 325 mg tablet Take 1 tablet by mouth every 4 (four) hours as needed      atorvastatin (LIPITOR) 20 mg tablet TAKE ONE TABLET BY MOUTH ONCE DAILY 90 tablet 3    cholecalciferol (VITAMIN D3) 1,000 units tablet Take 1 tablet by mouth daily      glucose blood (ONE TOUCH ULTRA TEST) test strip Test sugar 2-3 times daily 300 each 3    latanoprost (XALATAN) 0 005 % ophthalmic solution Apply to eye      [employer] UNISTIK II LANCETS MISC by Does not apply route 3 (three) times a day for 90 days 300 each 3    lisinopril-hydrochlorothiazide (PRINZIDE,ZESTORETIC) 20-25 MG per tablet Take 1 tablet by mouth daily      metFORMIN (GLUCOPHAGE) 1000 MG tablet Take 1 tablet (1,000 mg total) by mouth 2 (two) times a day for 90 days 180 tablet 3    metoprolol tartrate (LOPRESSOR) 25 mg tablet Take 0 5 tablets by mouth 2 (two) times a day      Multiple Vitamins-Minerals (MULTIVITAL-M) TABS Take 1 tablet by mouth daily      Omega-3 Fatty Acids (FISH OIL) 1,000 mg Take by mouth 2 (two) times a day      vitamin E, tocopherol, 400 units capsule Take by mouth daily       No current facility-administered medications for this visit        No Known Allergies  Immunization History   Administered Date(s) Administered    Influenza Split High Dose Preservative Free IM 12/04/2015, 01/16/2017, 01/16/2018    Pneumococcal Conjugate 13-Valent 12/04/2015       Patient Care Team:  Jeremias Shaw MD as PCP - General    Medicare Screening Tests and Risk Assessments:  AWV Clinical     ISAR:   Previous hospitalizations?:  No       Once in a Lifetime Medicare Screening:   AAA screening performed? (if performed, please add date to Health Maintenance):  No       Medicare Screening Tests and Risk Assessment:   AAA Risk Assessment     Tobacco use (males only):   No    Family history of AAA:  No   Osteoporosis Risk Assessment    :  Yes    Age over 48:  Yes    HIV Risk Assessment        Drug and Alcohol Use:   Tobacco use    Cigarettes:  never smoker    Smokeless:  never used smokeless tobacco    Tobacco use duration    Tobacco Cessation Readiness    Alcohol use    Alcohol use:  rare use    Alcohol Treatment Readiness   Illicit Drug Use    Drug use:  never        Diet & Exercise:   Diet   How many servings a day of the following:   Fruits and Vegetables:  3-4 Meat:  1-2   Whole Grains:  1    Coffee:  1    Exercise    Do you currently exercise?:  yes    Frequency:  occasional       Cognitive Impairment Screening:   Depression screening preformed:  Yes     PHQ-9 Depression scale score:  2   Cognitive Impairment Screening        Functional Ability/Level of Safety:   Hearing    Hearing aid:  No    Hearing Impairment Assessment    Current Activities    Status:  unlimited ADL's, unlimited social activities, unlimited driving   Help needed with the folllowing:    ADL    Fall Risk   Have you fallen in the last 12 months?:  No    How many times?:  0    Injury History       Home Safety:   Home Safety Risk Factors       Advanced Directives:   Advanced Directives    Living Will:  No Durable POA for healthcare:  No   Advanced directive:  No    Patient's End of Life Decisions        Urinary Incontinence:       Glaucoma:            Provider Screening     Preventative Screening/Counseling:   Cardiovascular Screening/Counseling:   (Labs Q5 years, EKG optional one-time)         Diabetes Screening/Counseling:   (2 tests/year if Pre-Diabetes or 1 test/year if no Diabetes)         Colorectal Cancer Screening/Counseling:   (FOBT Q1 yr; Flex Sig Q4 yrs or Q10 yrs after Screening Colonoscopy; Screening Colonoscpy Q2 yrs High Risk or Q10 yrs Low Risk; Barium Enema Q2 yrs High Risk or Q4 yrs Low Risk)         Prostate Cancer Screening/Counseling:   (Annual)          Breast Cancer Screening/Counseling:   (Baseline Age 28 - 43; Annual Age 36+)         Cervical Cancer Screening/Counseling:   (Annual for High Risk or Childbearing Age with Abnormal Pap in Last 3 yrs; Every 2 all others)         Osteoporosis Screening/Counseling:   (Every 2 Yrs if at risk or more if medically necessary)         AAA Screening/Counseling:   (Once per Lifetime with risk factors)    Family History of AAA:  No  Tobacco use (males only):  No         Glaucoma Screening/Counseling:   (Annual)         HIV Screening/Counseling:   (Voluntary; Once annually for high risk OR 3 times for Pregnancy at diagnosis of IUP; 3rd trimester; and at Labor         Hepatitis C Screening:             Immunizations: Other Preventative Couseling (Non-Medicare Wellness Visit Required):       Referrals (Non-Medicare Wellness Visit Required):       Medical Equipment/Suppliers:           No exam data present    Physical Exam :  General ROS: negative for CP  C/o exertional SOB, dry cough  Physical Exam     Lung exam: crackles at R lower lung base  MMSE: 27/30  No falls

## 2018-07-18 DIAGNOSIS — R05.8 DRY COUGH: ICD-10-CM

## 2018-07-18 DIAGNOSIS — R93.89 ABNORMAL CT OF THE CHEST: Primary | ICD-10-CM

## 2018-07-18 DIAGNOSIS — R06.02 SOB (SHORTNESS OF BREATH) ON EXERTION: ICD-10-CM

## 2018-07-18 RX ORDER — FUROSEMIDE 20 MG/1
TABLET ORAL
Qty: 30 TABLET | Refills: 0 | Status: SHIPPED | OUTPATIENT
Start: 2018-07-18 | End: 2018-08-16 | Stop reason: SDUPTHER

## 2018-07-18 RX ORDER — AZITHROMYCIN 250 MG/1
TABLET, FILM COATED ORAL
Qty: 6 TABLET | Refills: 0 | Status: SHIPPED | OUTPATIENT
Start: 2018-07-18 | End: 2018-07-22

## 2018-07-18 NOTE — ASSESSMENT & PLAN NOTE
Take Vit E 400 IU daily  Follow a low fat diet, lose weight  Follow -up with gastroenterologist Dr Kathy Hay

## 2018-07-18 NOTE — ASSESSMENT & PLAN NOTE
Lab Results   Component Value Date    HGBA1C 6 9 (H) 07/13/2018       No results for input(s): POCGLU in the last 72 hours  Blood Sugar Average: Last 72 hrs:    Continue Metformin 1000 mg 1 tablet twice daily  Patient will check with pharmacist if he can change his glucometer due to giving high blood sugar readings at home  Check eye exam by ophthalmologist Tyra Brannon annually  Recommended podiatry evaluation

## 2018-07-18 NOTE — ASSESSMENT & PLAN NOTE
Blood pressure is stable  Continue Lisinopril/HCTZ 20/25 mg one tablet daily, Amlodipine 5 mg daily  Follow a low-sodium diet  Consider changing ACE  to  ARB due to dry cough

## 2018-07-18 NOTE — ASSESSMENT & PLAN NOTE
Continue Atorvastatin 20 mg daily  Recommended to follow a low cholesterol, low-fat diet, regular exercise

## 2018-07-18 NOTE — ASSESSMENT & PLAN NOTE
Patient was seen by cardiologist Dr Phil Yu  in February 2018  Nuclear stress test performed in April 2018 was negative for cardiac ischemia  Patient c/o dry cough  Pulse ox is 90-91 % at rest   Will order CTA of the chest to rule out pulmonary embolism, lung pathology  Consider referral to pulmonology for further evaluation

## 2018-07-19 ENCOUNTER — OFFICE VISIT (OUTPATIENT)
Dept: PULMONOLOGY | Facility: CLINIC | Age: 68
End: 2018-07-19
Payer: MEDICARE

## 2018-07-19 VITALS
TEMPERATURE: 99.2 F | WEIGHT: 260 LBS | SYSTOLIC BLOOD PRESSURE: 122 MMHG | HEART RATE: 96 BPM | OXYGEN SATURATION: 92 % | RESPIRATION RATE: 19 BRPM | HEIGHT: 70 IN | BODY MASS INDEX: 37.22 KG/M2 | DIASTOLIC BLOOD PRESSURE: 72 MMHG

## 2018-07-19 DIAGNOSIS — J67.9 HYPERSENSITIVITY PNEUMONITIS (HCC): Primary | ICD-10-CM

## 2018-07-19 DIAGNOSIS — R93.89 ABNORMAL CT OF THE CHEST: ICD-10-CM

## 2018-07-19 PROCEDURE — 99205 OFFICE O/P NEW HI 60 MIN: CPT | Performed by: INTERNAL MEDICINE

## 2018-07-19 PROCEDURE — 94618 PULMONARY STRESS TESTING: CPT | Performed by: INTERNAL MEDICINE

## 2018-07-19 NOTE — PROGRESS NOTES
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  I have explained to the patient the nature of his disease and the potential of progression if he continues get exposed to the bird antigens  The patient seems to have difficulty to convince the wife to get rid of the bird  The patient desaturated on the 6 minutes walk test   However he requested if he can do without it for now and I agreed provided that he cleans his home environment  I have not started him on steroids at this point in time since he is fairly asymptomatic except for mild to moderate dyspnea on exertion and hypoxemia on exertion  I have ordered a hypersensitivity pneumonitis profile  I will see the patient in 3 months  History of Present Illness   HPI:  Gloria Falcon is a 77 y o  male who is here for evaluation of abnormal CT scan of the chest   Three days ago the patient went to see his family doctor for a wellness visit  On the review of systems the patient stated that he is short of breath on exertion  Also he developed occasional cough  A CT scan of the chest was ordered  The result was abnormal   For this reason the patient was referred to me for further evaluation  The patient stated that he has shortness of breath on exertion noticed about a a year ago  He noticed that mainly when he is mowing the lawn    It has not changed significantly  The patient denies any significant cough  Denies any chest pain or palpitations  No wheezing  Denies nocturnal symptoms  Denies daytime hypersomnolence  Review of systems:  12 point review of systems was completed and was otherwise negative except as listed in HPI  Historical Information   Past Medical History:   Diagnosis Date    Cataract, left     last assessed [id number]    Epistaxis     last assessed 16Jan2017    Exertional shortness of breath     last assessed 16jan2018    IFG (impaired fasting glucose)     last assessed 01Apr2016     Past Surgical History:   Procedure Laterality Date    CATARACT EXTRACTION Left 07/01/2016    L eye cataract surgery - 7/16     Family History   Problem Relation Age of Onset    Stomach cancer Father     No Known Problems Mother        Family History:  Noncontributory  Social History:  The patient is  and lives with his wife  His wife has multiple chronic medical problems  He is a lifelong nonsmoker  Denies alcohol use  He was a   He has a cockatiel for the last 10 years  His wife is the one who cares for the bird      Meds/Allergies   No current facility-administered medications for this visit  (Not in a hospital admission)  No Known Allergies    Vitals: Blood pressure 122/72, pulse 96, temperature 99 2 °F (37 3 °C), temperature source Tympanic, resp  rate 19, height 5' 10" (1 778 m), weight 118 kg (260 lb), SpO2 92 %  ,      Physical exam:        Head/eyes:    Normocephalic, without obvious abnormality, atraumatic,         PERRL, extraocular muscles intact, no scleral icterus    Nose:   Nares normal, septum midline, mucosa normal, no drainage    or sinus tenderness   Throat:   Moist mucous membranes, no thrush   Neck:   Supple, trachea midline, no adenopathy; no carotid    bruit or JVD   Lungs:   Faint posterior crackles  No wheezing          Heart:    Regular rate and rhythm, S1 and S2 normal, no murmur, rub or gallop   Abdomen:     Soft, non-tender, bowel sounds active all four quadrants,     no masses, no organomegaly   Extremities:   Extremities normal, atraumatic, no cyanosis or edema   Skin:   Warm, dry, turgor normal, no rashes or lesions   Neurologic:   CNII-XII intact, normal strength, non-focal             Labs:  CBC from a year ago showed mildly elevated eosinophils  Imaging and other studies: I have personally reviewed pertinent films in PACS CT scan of the chest from 2 days ago is reviewed on the [facility] system and it showed diffuse bilateral ground-glass opacities  A 6 minutes walk test was done today in the office and the patient started with a heart rate of 107 beats per minute and O2 saturation 93%  After 1 minutes the O2 saturation dropped to 86%  The patient was placed on 2 L oxygen and continued walking  The oxygen was titrated up to 3 L to maintain O2 saturation above 90%  At the end of the test the heart rate went up to 130 beats per minute  Patient walked total of 252 meters          Kamron Moon MD

## 2018-07-20 ENCOUNTER — APPOINTMENT (OUTPATIENT)
Dept: LAB | Facility: CLINIC | Age: 68
End: 2018-07-20
Payer: MEDICARE

## 2018-07-20 DIAGNOSIS — J67.9 HYPERSENSITIVITY PNEUMONITIS (HCC): ICD-10-CM

## 2018-07-20 PROCEDURE — 36415 COLL VENOUS BLD VENIPUNCTURE: CPT

## 2018-07-20 PROCEDURE — 86606 ASPERGILLUS ANTIBODY: CPT

## 2018-07-20 PROCEDURE — 86671 FUNGUS NES ANTIBODY: CPT

## 2018-07-20 PROCEDURE — 86602 ANTINOMYCES ANTIBODY: CPT

## 2018-07-20 PROCEDURE — 86331 IMMUNODIFFUSION OUCHTERLONY: CPT

## 2018-07-25 LAB
A FUMIGATUS1 AB SER QL ID: NEGATIVE
A PULLULANS AB SER QL: NEGATIVE
LACEYELLA SACCHARI AB SER QL: NEGATIVE
PIGEON SERUM AB QL ID: NEGATIVE
S RECTIVIRGULA AB SER QL ID: NEGATIVE
T VULGARIS AB SER QL ID: NEGATIVE

## 2018-07-26 ENCOUNTER — TELEPHONE (OUTPATIENT)
Dept: FAMILY MEDICINE CLINIC | Facility: CLINIC | Age: 68
End: 2018-07-26

## 2018-07-26 NOTE — TELEPHONE ENCOUNTER
Patient phoned to give an update-he finished the antibiotic that was prescribed for him and is feeling better

## 2018-08-16 DIAGNOSIS — R06.02 SOB (SHORTNESS OF BREATH) ON EXERTION: ICD-10-CM

## 2018-08-17 RX ORDER — FUROSEMIDE 20 MG/1
20 TABLET ORAL DAILY
Qty: 90 TABLET | Refills: 3 | Status: SHIPPED | OUTPATIENT
Start: 2018-08-17 | End: 2019-01-15 | Stop reason: ALTCHOICE

## 2018-10-23 ENCOUNTER — OFFICE VISIT (OUTPATIENT)
Dept: PULMONOLOGY | Facility: CLINIC | Age: 68
End: 2018-10-23
Payer: MEDICARE

## 2018-10-23 ENCOUNTER — TELEPHONE (OUTPATIENT)
Dept: PULMONOLOGY | Facility: CLINIC | Age: 68
End: 2018-10-23

## 2018-10-23 VITALS
HEIGHT: 70 IN | OXYGEN SATURATION: 91 % | SYSTOLIC BLOOD PRESSURE: 140 MMHG | BODY MASS INDEX: 37.45 KG/M2 | DIASTOLIC BLOOD PRESSURE: 70 MMHG | TEMPERATURE: 98.4 F | WEIGHT: 261.6 LBS | HEART RATE: 100 BPM

## 2018-10-23 DIAGNOSIS — R06.00 DOE (DYSPNEA ON EXERTION): Primary | ICD-10-CM

## 2018-10-23 DIAGNOSIS — J96.11 CHRONIC HYPOXEMIC RESPIRATORY FAILURE (HCC): ICD-10-CM

## 2018-10-23 DIAGNOSIS — Z23 ENCOUNTER FOR IMMUNIZATION: ICD-10-CM

## 2018-10-23 DIAGNOSIS — J67.9 HYPERSENSITIVITY PNEUMONITIS (HCC): ICD-10-CM

## 2018-10-23 PROCEDURE — 99215 OFFICE O/P EST HI 40 MIN: CPT | Performed by: INTERNAL MEDICINE

## 2018-10-23 PROCEDURE — G0008 ADMIN INFLUENZA VIRUS VAC: HCPCS | Performed by: INTERNAL MEDICINE

## 2018-10-23 PROCEDURE — 94618 PULMONARY STRESS TESTING: CPT | Performed by: INTERNAL MEDICINE

## 2018-10-23 PROCEDURE — 90662 IIV NO PRSV INCREASED AG IM: CPT | Performed by: INTERNAL MEDICINE

## 2018-10-23 RX ORDER — PREDNISONE 20 MG/1
40 TABLET ORAL DAILY
Qty: 40 TABLET | Refills: 1 | Status: SHIPPED | OUTPATIENT
Start: 2018-10-23 | End: 2019-01-15 | Stop reason: ALTCHOICE

## 2018-10-23 NOTE — TELEPHONE ENCOUNTER
Emailed all documentation needed to contact the patient for his new Oxygen order   [phone] Pt's cell for Tian'jennifer badillo

## 2018-10-23 NOTE — PROGRESS NOTES
Office Progress Note - Pulmonary    Lisette Kirk 77 y o  male MRN: [id number]    Encounter: [id number]      Assessment:  · Hypersensitivity pneumonitis  · Chronic hypoxemic respiratory failure  · Dyspnea on exertion  Plan:   · Chest x-ray PA and lateral   · 6 min walk test   · Home oxygen  · Prednisone 40 mg p o  daily for 2 weeks  Then we will taper the prednisone by 10 mg every week if his condition improves  · Influenza vaccine  · Follow-up in 2 weeks  Discussion:   The patient's dyspnea on exertion is still persistent  Unfortunately he desaturated on the 6 min walk test and requires 3 L to maintain O2 saturation above 88%  Even though the hypersensitivity provided is negative a cannot find another explanation for his diffuse airspace disease other than hypersensitivity pneumonitis given the exposure to the cockatiel  I have ordered a PA and lateral chest x-ray  I have started him on prednisone 40 mg p o  daily once a day  Based on his response to the prednisone will taper the dose  I have ordered the oxygen with portability  He received the influenza vaccine  I will see him in 2 weeks in a follow-up visit  Subjective: The patient is here for a follow-up visit  He still has shortness of breath on exertion  He stated that the cockatiel was removed from his house a week after I saw last visit which was 3 months ago  He denies any cough, wheezing or sputum production  Denies any chest pain or palpitations  Review of systems:  A 12 point system review is done and aside from what is stated above the rest of the review of systems is negative  Family history and social history are reviewed  Medications list is reviewed  Vitals: Blood pressure 140/70, pulse 100, temperature 98 4 °F (36 9 °C), height 5' 10" (1 778 m), weight 119 kg (261 lb 9 6 oz), SpO2 91 %  ,     Physical Exam  Gen: Awake, alert, oriented x 3, no acute distress  HEENT: Mucous membranes moist, no oral lesions, no thrush  NECK: No accessory muscle use, JVP not elevated  Cardiac: Regular, single S1, single S2, no murmurs, no rubs, no gallops  Lungs:   Decreased breath sounds  No wheezing or rhonchi  Abdomen: normoactive bowel sounds, soft nontender, nondistended, no rebound or rigidity, no guarding  Extremities: no cyanosis, no clubbing, no edema  Neuro:  Grossly nonfocal   Skin:  No rash  Hypersensitivity profile was negative  A 6 min walk test was done today in the office and the patient started with a heart rate of 90 beats per minute and O2 saturation 93%  After 2 min his O2 saturation dropped to 86% and the heart rate increased to 107 beats per minute  The patient was placed on 2 L of oxygen and the test was continued  The oxygen need to be titrated up to 3 L to maintain O2 saturation above 88%

## 2018-10-25 ENCOUNTER — HOSPITAL ENCOUNTER (OUTPATIENT)
Dept: RADIOLOGY | Facility: HOSPITAL | Age: 68
Discharge: HOME | End: 2018-10-25
Attending: INTERNAL MEDICINE
Payer: MEDICARE

## 2018-10-25 ENCOUNTER — TRANSCRIBE ORDERS (OUTPATIENT)
Dept: RADIOLOGY | Facility: HOSPITAL | Age: 68
End: 2018-10-25

## 2018-10-25 DIAGNOSIS — J67.9 HYPERSENSITIVITY PNEUMONITIS (HCC): ICD-10-CM

## 2018-10-25 DIAGNOSIS — E55.9 VITAMIN D DEFICIENCY: Primary | ICD-10-CM

## 2018-10-25 PROCEDURE — 71046 X-RAY EXAM CHEST 2 VIEWS: CPT

## 2018-11-06 ENCOUNTER — OFFICE VISIT (OUTPATIENT)
Dept: PULMONOLOGY | Facility: CLINIC | Age: 68
End: 2018-11-06
Payer: MEDICARE

## 2018-11-06 VITALS
TEMPERATURE: 97.9 F | HEART RATE: 87 BPM | OXYGEN SATURATION: 94 % | BODY MASS INDEX: 36.82 KG/M2 | HEIGHT: 70 IN | WEIGHT: 257.2 LBS | DIASTOLIC BLOOD PRESSURE: 70 MMHG | SYSTOLIC BLOOD PRESSURE: 138 MMHG

## 2018-11-06 DIAGNOSIS — R06.00 DOE (DYSPNEA ON EXERTION): ICD-10-CM

## 2018-11-06 DIAGNOSIS — J67.9 HYPERSENSITIVITY PNEUMONITIS (HCC): Primary | ICD-10-CM

## 2018-11-06 PROCEDURE — 99214 OFFICE O/P EST MOD 30 MIN: CPT | Performed by: INTERNAL MEDICINE

## 2018-11-06 RX ORDER — PREDNISONE 10 MG/1
10 TABLET ORAL DAILY
Qty: 15 TABLET | Refills: 0 | Status: SHIPPED | OUTPATIENT
Start: 2018-11-06 | End: 2019-01-15 | Stop reason: ALTCHOICE

## 2018-11-06 NOTE — PROGRESS NOTES
Office Progress Note - Pulmonary    Jeffery Roque 77 y o  male MRN: [id number]    Encounter: [id number]      Assessment:  · Hypersensitivity pneumonitis  · Dyspnea on exertion  · Chronic hypoxemic respiratory failure  Plan:   · Decrease prednisone to 30 mg p o  daily starting tomorrow and taper by 10 mg every week  · We will contact the company regarding the home oxygen  · Follow-up in 4 weeks  Discussion:   The patient's cough has markedly improved as well as his dyspnea  He still has dyspnea on exertion which could be due to his obesity and deconditioning  His chest x-ray today showed improvement in the bilateral pulmonary infiltrates  I have decreased his prednisone to 30 mg p o  daily starting tomorrow  I will taper the dose by 10 mg every week  We will contact the company to rectify the problem of him not getting the oxygen at home  I will see him in 4 weeks in a follow-up visit  Subjective: The patient is here for a follow-up visit  His cough has markedly improved  His dyspnea on exertion has improved by at least 50%  He still has dyspnea on exertion  Denies any chest pain or palpitations  Denies chest wheezing  He has taken prednisone 40 mg once a day  Review of systems:  A 12 point system review is done and aside from what is stated above the rest of the review of systems is negative  Family history and social history are reviewed  Medications list is reviewed  Vitals: Blood pressure 138/70, pulse 87, temperature 97 9 °F (36 6 °C), temperature source Tympanic, height 5' 10" (1 778 m), weight 117 kg (257 lb 3 2 oz), SpO2 94 %  ,     Physical Exam  Gen: Awake, alert, oriented x 3, no acute distress  HEENT: Mucous membranes moist, no oral lesions, no thrush  NECK: No accessory muscle use, JVP not elevated  Cardiac: Regular, single S1, single S2, no murmurs, no rubs, no gallops  Lungs:   Clear breath sounds    Abdomen: normoactive bowel sounds, soft nontender, nondistended, no rebound or rigidity, no guarding  Extremities: no cyanosis, no clubbing, no edema  Neuro:  Grossly nonfocal   Skin:  No rash  Chest x-ray is reviewed on the [employer] system and compared to the CT scan of the chest done earlier  The bilateral airspace disease has markedly improved

## 2018-11-09 ENCOUNTER — APPOINTMENT (OUTPATIENT)
Dept: LAB | Facility: CLINIC | Age: 68
End: 2018-11-09
Payer: MEDICARE

## 2018-11-09 DIAGNOSIS — E55.9 VITAMIN D DEFICIENCY: ICD-10-CM

## 2018-11-09 LAB — 25(OH)D3 SERPL-MCNC: 29.8 NG/ML (ref 30–100)

## 2018-11-09 PROCEDURE — 36415 COLL VENOUS BLD VENIPUNCTURE: CPT

## 2018-11-09 PROCEDURE — 82306 VITAMIN D 25 HYDROXY: CPT

## 2018-12-20 ENCOUNTER — OFFICE VISIT (OUTPATIENT)
Dept: PULMONOLOGY | Facility: CLINIC | Age: 68
End: 2018-12-20
Payer: MEDICARE

## 2018-12-20 VITALS
HEIGHT: 70 IN | RESPIRATION RATE: 19 BRPM | BODY MASS INDEX: 36.65 KG/M2 | SYSTOLIC BLOOD PRESSURE: 118 MMHG | DIASTOLIC BLOOD PRESSURE: 68 MMHG | WEIGHT: 256 LBS | HEART RATE: 96 BPM | OXYGEN SATURATION: 92 % | TEMPERATURE: 99.2 F

## 2018-12-20 DIAGNOSIS — J67.9 HYPERSENSITIVITY PNEUMONIA (HCC): ICD-10-CM

## 2018-12-20 DIAGNOSIS — R06.00 DOE (DYSPNEA ON EXERTION): Primary | ICD-10-CM

## 2018-12-20 PROCEDURE — 99214 OFFICE O/P EST MOD 30 MIN: CPT | Performed by: INTERNAL MEDICINE

## 2018-12-20 NOTE — PROGRESS NOTES
Office Progress Note - Pulmonary    Rachel Regalado 77 y o  male MRN: [id number]    Encounter: [id number]      Assessment:  · Dyspnea on exertion  · Hypersensitivity pneumonitis  Improved  · Diastolic dysfunction  · Obesity  · Chronic hypoxemic respiratory failure  Plan:   · Regular exercise  · Weight loss  · Discontinue home oxygen  · Follow-up as needed  Discussion:   The patient's dyspnea on exertion is multifactorial   It is due to diastolic dysfunction, obesity and physical deconditioning  He still has chronic hypoxemic respiratory failure when he is exerting himself  The patient is adamant that he does not want to use the oxygen outdoors  He will continue to use it at home when he is watching TV  I have explained to the patient that he does not needed at rest but rather needs it more when he is exerting himself to help his dyspnea on exertion  Patient does not want to use it specially outside the house  For this reason I have discontinue the home oxygen  I have encouraged the patient to try to lose weight and take regular walks to improve stamina  I have not scheduled him for another appointment however I will be happy to see him in the future if the need arises  Subjective: The patient is here for a follow-up visit  His breathing has improved  He still has dyspnea on exertion which is chronic  It seems that the patient is not bothered by this symptom  He has occasional cough  No sputum production  Denies any chest pain  He has not been using his oxygen outdoors or with activities  He is mostly using it at home when he is sitting down  Review of systems:  A 12 point system review is done and aside from what is stated above the rest of the review of systems is negative  Family history and social history are reviewed  Medications list is reviewed        Vitals: Blood pressure 118/68, pulse 96, temperature 99 2 °F (37 3 °C), temperature source Tympanic, resp  rate 19, height 5' 10" (1 778 m), weight 116 kg (256 lb), SpO2 92 %  ,     Physical Exam  Gen: Awake, alert, oriented x 3, no acute distress  HEENT: Mucous membranes moist, no oral lesions, no thrush  NECK: No accessory muscle use, JVP not elevated  Cardiac: Regular, single S1, single S2, no murmurs, no rubs, no gallops  Lungs:   Bilateral basal crackles  Abdomen: normoactive bowel sounds, soft nontender, nondistended, no rebound or rigidity, no guarding  Extremities: no cyanosis, no clubbing, no edema  Neuro:  Grossly nonfocal   Skin:  No rash

## 2019-01-09 ENCOUNTER — APPOINTMENT (OUTPATIENT)
Dept: LAB | Facility: CLINIC | Age: 69
End: 2019-01-09
Payer: MEDICARE

## 2019-01-09 DIAGNOSIS — E11.65 TYPE 2 DIABETES MELLITUS WITH HYPERGLYCEMIA, WITHOUT LONG-TERM CURRENT USE OF INSULIN (HCC): ICD-10-CM

## 2019-01-09 DIAGNOSIS — I10 BENIGN ESSENTIAL HYPERTENSION: ICD-10-CM

## 2019-01-09 DIAGNOSIS — E78.2 MIXED HYPERLIPIDEMIA: ICD-10-CM

## 2019-01-09 LAB
ALBUMIN SERPL BCP-MCNC: 3.7 G/DL (ref 3.5–5)
ALP SERPL-CCNC: 97 U/L (ref 46–116)
ALT SERPL W P-5'-P-CCNC: 22 U/L (ref 12–78)
ANION GAP SERPL CALCULATED.3IONS-SCNC: 11 MMOL/L (ref 4–13)
AST SERPL W P-5'-P-CCNC: 19 U/L (ref 5–45)
BILIRUB SERPL-MCNC: 1.18 MG/DL (ref 0.2–1)
BUN SERPL-MCNC: 15 MG/DL (ref 5–25)
CALCIUM SERPL-MCNC: 8.7 MG/DL (ref 8.3–10.1)
CHLORIDE SERPL-SCNC: 102 MMOL/L (ref 100–108)
CHOLEST SERPL-MCNC: 162 MG/DL (ref 50–200)
CO2 SERPL-SCNC: 26 MMOL/L (ref 21–32)
CREAT SERPL-MCNC: 1.07 MG/DL (ref 0.6–1.3)
EST. AVERAGE GLUCOSE BLD GHB EST-MCNC: 197 MG/DL
GFR SERPL CREATININE-BSD FRML MDRD: 70 ML/MIN/1.73SQ M
GLUCOSE P FAST SERPL-MCNC: 140 MG/DL (ref 65–99)
HBA1C MFR BLD: 8.5 % (ref 4.2–6.3)
HDLC SERPL-MCNC: 40 MG/DL (ref 40–60)
LDLC SERPL CALC-MCNC: 71 MG/DL (ref 0–100)
NONHDLC SERPL-MCNC: 122 MG/DL
POTASSIUM SERPL-SCNC: 3.4 MMOL/L (ref 3.5–5.3)
PROT SERPL-MCNC: 7.6 G/DL (ref 6.4–8.2)
SODIUM SERPL-SCNC: 139 MMOL/L (ref 136–145)
TRIGL SERPL-MCNC: 254 MG/DL

## 2019-01-09 PROCEDURE — 36415 COLL VENOUS BLD VENIPUNCTURE: CPT

## 2019-01-09 PROCEDURE — 80061 LIPID PANEL: CPT

## 2019-01-09 PROCEDURE — 80053 COMPREHEN METABOLIC PANEL: CPT

## 2019-01-09 PROCEDURE — 83036 HEMOGLOBIN GLYCOSYLATED A1C: CPT

## 2019-01-15 ENCOUNTER — OFFICE VISIT (OUTPATIENT)
Dept: FAMILY MEDICINE CLINIC | Facility: CLINIC | Age: 69
End: 2019-01-15
Payer: MEDICARE

## 2019-01-15 VITALS
TEMPERATURE: 98.9 F | WEIGHT: 251.8 LBS | BODY MASS INDEX: 36.13 KG/M2 | OXYGEN SATURATION: 94 % | RESPIRATION RATE: 20 BRPM | SYSTOLIC BLOOD PRESSURE: 110 MMHG | HEART RATE: 100 BPM | DIASTOLIC BLOOD PRESSURE: 70 MMHG

## 2019-01-15 DIAGNOSIS — I10 BENIGN ESSENTIAL HYPERTENSION: Primary | ICD-10-CM

## 2019-01-15 DIAGNOSIS — R06.02 EXERTIONAL SHORTNESS OF BREATH: ICD-10-CM

## 2019-01-15 DIAGNOSIS — E66.01 MORBID OBESITY DUE TO EXCESS CALORIES (HCC): ICD-10-CM

## 2019-01-15 DIAGNOSIS — K76.0 FATTY LIVER: ICD-10-CM

## 2019-01-15 DIAGNOSIS — E11.42 DIABETIC POLYNEUROPATHY ASSOCIATED WITH TYPE 2 DIABETES MELLITUS (HCC): ICD-10-CM

## 2019-01-15 DIAGNOSIS — E11.65 TYPE 2 DIABETES MELLITUS WITH HYPERGLYCEMIA, WITHOUT LONG-TERM CURRENT USE OF INSULIN (HCC): ICD-10-CM

## 2019-01-15 DIAGNOSIS — E78.2 MIXED HYPERLIPIDEMIA: ICD-10-CM

## 2019-01-15 PROBLEM — E11.40 DIABETIC NEUROPATHY ASSOCIATED WITH TYPE 2 DIABETES MELLITUS (HCC): Status: ACTIVE | Noted: 2019-01-15

## 2019-01-15 PROCEDURE — 99215 OFFICE O/P EST HI 40 MIN: CPT | Performed by: FAMILY MEDICINE

## 2019-01-15 NOTE — PROGRESS NOTES
Chief Complaint   Patient presents with    Follow-up     6 month follow up     Health Maintenance   Topic Date Due    CRC Screening: Colonoscopy  1950    DTaP,Tdap,and Td Vaccines (1 - Tdap) 01/10/1969    Pneumococcal PPSV23/PCV13 65+ Years / Low and Medium Risk (2 of 2 - PPSV23) 12/04/2016    Diabetic Foot Exam  07/17/2018    DM Eye Exam  02/15/2019    HEMOGLOBIN A1C  07/09/2019    URINE MICROALBUMIN  07/13/2019    Fall Risk  07/17/2019    Depression Screening PHQ  07/17/2019    Medicare Annual Wellness Visit (AWV)  07/17/2019    Hepatitis C Screening  Completed    INFLUENZA VACCINE  Completed     Assessment/Plan:    Benign essential hypertension  Patient reports low blood pressure readings at home  C/o feeling lightheaded with positional changes  Recommended to d/c asix 20 mg daily  Continue Lisinopril/HCTZ 20/25  mg 1 tablet daily, Amlodipine 5 mg daily  Monitor blood pressure at home and call with BP log in 2 weeks  Will adjust blood pressure medications accordingly  Type 2 diabetes mellitus with hyperglycemia (HCC)  Lab Results   Component Value Date    HGBA1C 8 5 (H) 01/09/2019       No results for input(s): POCGLU in the last 72 hours  Blood Sugar Average: Last 72 hrs: Worsening blood sugar control since July 2018  Discussed diet with patient  Patient declined nutrition counseling  He would like to work on diet and weight loss on his own  Patient does not want to add another medications to his medical regimen  Will continue Metformin 1000 mg 1 tablet twice daily  Recheck labs in for 4 months  Mixed hyperlipidemia  Continue Aorvastatin 20 mg daily  Discussed a low-cholesterol, low-fat diet with patient  Morbid obesity due to excess calories ([address] )  Encouraged regular exercise, weight reduction  Exertional shortness of breath  Patient declined supplemental oxygen with exertional activities as recommended by pulmonologist Dr Raymond Pizarro stress test done in April 2018 was negative for cardiac ischemia  Patient was advised to work on weight loss  Call office if symptoms persist or worsen  Fatty liver  Continue Vit E 400 IU daily  Follow- up with gastroenterologist Dr Silvia Naylor this week  Diabetic neuropathy associated with type 2 diabetes mellitus ([facility] )  Lab Results   Component Value Date    HGBA1C 8 5 (H) 01/09/2019       No results for input(s): POCGLU in the last 72 hours  Blood Sugar Average: Last 72 hrs:    Stressed the importance of good blood sugar control  Recommended podiatry evaluation  HM: patient refusing colonoscopy  I have spent 40 minutes with Patient  today in which greater than 50% of this time was spent in counseling/coordination of care regarding Diagnostic results, Risks and benefits of tx options, Intructions for management, Patient and family education, Importance of tx compliance, Risk factor reductions and Impressions  Schedule follow-up office visit in 4 months  Check labs prior to next visit  Diagnoses and all orders for this visit:    Benign essential hypertension  -     Comprehensive metabolic panel; Future    Type 2 diabetes mellitus with hyperglycemia, without long-term current use of insulin (HCC)  -     Comprehensive metabolic panel; Future  -     Microalbumin / creatinine urine ratio; Future  -     Hemoglobin A1C; Future  -     Microalbumin / creatinine urine ratio; Future    Diabetic polyneuropathy associated with type 2 diabetes mellitus ([facility] )    Mixed hyperlipidemia  -     Comprehensive metabolic panel; Future    Morbid obesity due to excess calories (HCC)    Exertional shortness of breath    Fatty liver          Subjective:      Patient ID: Jeaneth Avila is a 68 y o  male  HPI     Patient presents for 6 month follow-up of chronic medical conditions        PMHx: HTN, Type 2 DM, Hyperlipidemia, Morbid obesity, fatty liver      Reviewed all current medications, blood work results from 1/9/19  Creatinine is 1 07, potassium 3 4, fasting glucse 140, triglycerides 254, cholesterol 162, HDL 40, LDL 71,   AST, ALT - within normal limits  Hb A1c 8 5 ( previously 6 9 in 7/18)  Type 2 DM - patient takes metformin 1000 mg twice daily  Blood sugar at home ranges 150-180's  Patient's wife was recently in the hospital and then was discharged to nursing home rehab  Patient states that he has not been following a healthy diet for the last few months, gained weight  HTN - patient takes Lisinopril/HCTZ 20/25 mg 1 tablet daily, Amlodipine 5 mg daily  He also takes Lasix 20 mg daily  C/o feeling lightheaded with positional changes, reports low blood pressure readings at home  Denies chest pain, heart palpitations  He was seen by cardiologist Dr Lala Delatorre 2/18  for evaluation of exertional shortness of breath  Nuclear stress test done in April 2018 was negative for cardiac ischemia  Patient still c/o exertional shortness of breath  He was seen in 12/18 by pulmonologist Dr Lindsay Nolasco who   stated in his office visit note that shortness of breath is likely multifactorial, related to deconditioning, obesity diastolic dysfunction, exertional hypoxia  Patient declined to use supplemental oxygen with  physical activities  Patient has been followed by gastroenterologist   Dr Geena Nails every 6 months for fatty liver  He has appointment with Dr Geena Nails this week  Currently taking Vit E 400 IU daily  Patient has not been seen by podiatrist   C/o occasional numbness in both feet  No foot ulcers  Eye exam done by ophthalmologist Dr Daniel Jaramillo a few months ago  Refusing colonoscopy  Prevnar 13 done in December 2015  Patient has Flu shot in 10/18  Review of Systems   Constitutional: Negative for activity change, appetite change, chills, fatigue and fever  HENT: Negative for congestion, ear pain, hearing loss, sore throat, tinnitus and trouble swallowing  Eyes: Negative for pain, discharge, redness, itching and visual disturbance  Respiratory: Negative for cough, chest tightness, shortness of breath (stable exertional SOB) and wheezing  Cardiovascular: Negative for chest pain, palpitations and leg swelling  Gastrointestinal: Negative for abdominal pain, blood in stool, constipation, diarrhea, nausea and vomiting  Genitourinary: Negative for difficulty urinating, dysuria, flank pain, frequency and hematuria  Nocturia   Musculoskeletal: Negative for arthralgias, back pain, gait problem, joint swelling and myalgias  Skin: Negative for rash and wound  Neurological: Positive for light-headedness  Negative for syncope and headaches  Hematological: Negative  Psychiatric/Behavioral: Negative  Objective:      /70 (BP Location: Left arm, Patient Position: Sitting, Cuff Size: Large)   Pulse 100   Temp 98 9 °F (37 2 °C) (Tympanic)   Resp 20   Wt 114 kg (251 lb 12 8 oz)   SpO2 94%   BMI 36 13 kg/m²          Physical Exam   Constitutional: He appears well-developed and well-nourished  Morbidly obese   HENT:   Head: Normocephalic and atraumatic  Right Ear: External ear normal    Left Ear: External ear normal    Mouth/Throat: Oropharynx is clear and moist    Eyes: Pupils are equal, round, and reactive to light  Conjunctivae are normal    Neck: Normal range of motion  Neck supple  No JVD present  Cardiovascular: Normal rate, regular rhythm and normal heart sounds  No murmur heard  No carotid bruits BL  No BL LE edema   Pulmonary/Chest: Effort normal and breath sounds normal  He has no wheezes  He has no rales  Abdominal: Soft  Bowel sounds are normal  There is no tenderness  Musculoskeletal: Normal range of motion  He exhibits no edema, tenderness or deformity  Skin: Skin is warm and dry  No rash noted  Psychiatric: He has a normal mood and affect  Nursing note and vitals reviewed  BMI Counseling:  Body mass index is 36 13 kg/m²  Discussed the patient's BMI with him  The BMI is above average  BMI counseling and education was provided to the patient  Nutrition recommendations include reducing portion sizes, decreasing overall calorie intake, 3-5 servings of fruits/vegetables daily, reducing fast food intake, consuming healthier snacks, decreasing soda and/or juice intake, moderation in carbohydrate intake, increasing intake of lean protein, reducing intake of saturated fat and trans fat and reducing intake of cholesterol

## 2019-01-16 NOTE — ASSESSMENT & PLAN NOTE
Patient reports low blood pressure readings at home  C/o feeling lightheaded with positional changes  Recommended to d/c asix 20 mg daily  Continue Lisinopril/HCTZ 20/25  mg 1 tablet daily, Amlodipine 5 mg daily  Monitor blood pressure at home and call with BP log in 2 weeks  Will adjust blood pressure medications accordingly

## 2019-01-16 NOTE — ASSESSMENT & PLAN NOTE
Lab Results   Component Value Date    HGBA1C 8 5 (H) 01/09/2019       No results for input(s): POCGLU in the last 72 hours  Blood Sugar Average: Last 72 hrs:    Stressed the importance of good blood sugar control  Recommended podiatry evaluation

## 2019-01-16 NOTE — ASSESSMENT & PLAN NOTE
Lab Results   Component Value Date    HGBA1C 8 5 (H) 01/09/2019       No results for input(s): POCGLU in the last 72 hours  Blood Sugar Average: Last 72 hrs: Worsening blood sugar control since July 2018  Discussed diet with patient  Patient declined nutrition counseling  He would like to work on diet and weight loss on his own  Patient does not want to add another medications to his medical regimen  Will continue Metformin 1000 mg 1 tablet twice daily  Recheck labs in for 4 months

## 2019-01-16 NOTE — ASSESSMENT & PLAN NOTE
Patient declined supplemental oxygen with exertional activities as recommended by pulmonologist Dr Negin Dela Cruz  Nuclear stress test done in April 2018 was negative for cardiac ischemia  Patient was advised to work on weight loss  Call office if symptoms persist or worsen

## 2019-01-18 DIAGNOSIS — I10 ESSENTIAL HYPERTENSION: Primary | ICD-10-CM

## 2019-01-18 RX ORDER — LISINOPRIL AND HYDROCHLOROTHIAZIDE 25; 20 MG/1; MG/1
TABLET ORAL
Qty: 90 TABLET | Refills: 3 | Status: SHIPPED | OUTPATIENT
Start: 2019-01-18 | End: 2020-05-07

## 2019-03-12 DIAGNOSIS — E78.2 MIXED HYPERLIPIDEMIA: ICD-10-CM

## 2019-03-12 DIAGNOSIS — I10 BENIGN ESSENTIAL HYPERTENSION: Primary | ICD-10-CM

## 2019-03-12 RX ORDER — ATORVASTATIN CALCIUM 20 MG/1
20 TABLET, FILM COATED ORAL DAILY
Qty: 90 TABLET | Refills: 3 | Status: SHIPPED | OUTPATIENT
Start: 2019-03-12 | End: 2020-03-23 | Stop reason: SDUPTHER

## 2019-03-12 NOTE — TELEPHONE ENCOUNTER
Refill  Metoprolol 25 mg  1/2 tab bid  # 60             Atorvastatin 20 mg  1 daily   #90  Kale Bird

## 2019-03-18 LAB
LEFT EYE DIABETIC RETINOPATHY: NORMAL
RIGHT EYE DIABETIC RETINOPATHY: NORMAL

## 2019-04-29 DIAGNOSIS — I10 ESSENTIAL HYPERTENSION: ICD-10-CM

## 2019-04-29 RX ORDER — AMLODIPINE BESYLATE 5 MG/1
5 TABLET ORAL DAILY
Qty: 90 TABLET | Refills: 3 | Status: SHIPPED | OUTPATIENT
Start: 2019-04-29 | End: 2020-05-15 | Stop reason: SDUPTHER

## 2019-05-07 ENCOUNTER — TRANSCRIBE ORDERS (OUTPATIENT)
Dept: LAB | Facility: CLINIC | Age: 69
End: 2019-05-07

## 2019-05-07 ENCOUNTER — APPOINTMENT (OUTPATIENT)
Dept: LAB | Facility: CLINIC | Age: 69
End: 2019-05-07
Payer: MEDICARE

## 2019-05-07 DIAGNOSIS — E78.2 MIXED HYPERLIPIDEMIA: ICD-10-CM

## 2019-05-07 DIAGNOSIS — E11.65 TYPE 2 DIABETES MELLITUS WITH HYPERGLYCEMIA, WITHOUT LONG-TERM CURRENT USE OF INSULIN (HCC): ICD-10-CM

## 2019-05-07 DIAGNOSIS — I10 BENIGN ESSENTIAL HYPERTENSION: ICD-10-CM

## 2019-05-07 LAB
ALBUMIN SERPL BCP-MCNC: 3.7 G/DL (ref 3.5–5)
ALP SERPL-CCNC: 82 U/L (ref 46–116)
ALT SERPL W P-5'-P-CCNC: 34 U/L (ref 12–78)
ANION GAP SERPL CALCULATED.3IONS-SCNC: 6 MMOL/L (ref 4–13)
AST SERPL W P-5'-P-CCNC: 22 U/L (ref 5–45)
BILIRUB SERPL-MCNC: 0.82 MG/DL (ref 0.2–1)
BUN SERPL-MCNC: 21 MG/DL (ref 5–25)
CALCIUM SERPL-MCNC: 8.6 MG/DL (ref 8.3–10.1)
CHLORIDE SERPL-SCNC: 107 MMOL/L (ref 100–108)
CO2 SERPL-SCNC: 29 MMOL/L (ref 21–32)
CREAT SERPL-MCNC: 1.14 MG/DL (ref 0.6–1.3)
CREAT UR-MCNC: 144 MG/DL
EST. AVERAGE GLUCOSE BLD GHB EST-MCNC: 171 MG/DL
GFR SERPL CREATININE-BSD FRML MDRD: 64 ML/MIN/1.73SQ M
GLUCOSE P FAST SERPL-MCNC: 147 MG/DL (ref 65–99)
HBA1C MFR BLD: 7.6 % (ref 4.2–6.3)
MICROALBUMIN UR-MCNC: 9.2 MG/L (ref 0–20)
MICROALBUMIN/CREAT 24H UR: 6 MG/G CREATININE (ref 0–30)
POTASSIUM SERPL-SCNC: 3.9 MMOL/L (ref 3.5–5.3)
PROT SERPL-MCNC: 7.5 G/DL (ref 6.4–8.2)
SODIUM SERPL-SCNC: 142 MMOL/L (ref 136–145)

## 2019-05-07 PROCEDURE — 83036 HEMOGLOBIN GLYCOSYLATED A1C: CPT

## 2019-05-07 PROCEDURE — 36415 COLL VENOUS BLD VENIPUNCTURE: CPT

## 2019-05-07 PROCEDURE — 82570 ASSAY OF URINE CREATININE: CPT

## 2019-05-07 PROCEDURE — 82043 UR ALBUMIN QUANTITATIVE: CPT

## 2019-05-07 PROCEDURE — 80053 COMPREHEN METABOLIC PANEL: CPT

## 2019-05-13 PROBLEM — E78.00 HYPERCHOLESTEROLEMIA: Status: RESOLVED | Noted: 2018-02-06 | Resolved: 2019-05-13

## 2019-05-14 ENCOUNTER — OFFICE VISIT (OUTPATIENT)
Dept: FAMILY MEDICINE CLINIC | Facility: CLINIC | Age: 69
End: 2019-05-14
Payer: MEDICARE

## 2019-05-14 VITALS
OXYGEN SATURATION: 92 % | RESPIRATION RATE: 24 BRPM | HEIGHT: 70 IN | WEIGHT: 257 LBS | HEART RATE: 86 BPM | DIASTOLIC BLOOD PRESSURE: 72 MMHG | BODY MASS INDEX: 36.79 KG/M2 | TEMPERATURE: 98.1 F | SYSTOLIC BLOOD PRESSURE: 116 MMHG

## 2019-05-14 DIAGNOSIS — E11.65 TYPE 2 DIABETES MELLITUS WITH HYPERGLYCEMIA, WITHOUT LONG-TERM CURRENT USE OF INSULIN (HCC): ICD-10-CM

## 2019-05-14 DIAGNOSIS — K76.0 FATTY LIVER: ICD-10-CM

## 2019-05-14 DIAGNOSIS — E78.2 MIXED HYPERLIPIDEMIA: ICD-10-CM

## 2019-05-14 DIAGNOSIS — E11.42 DIABETIC POLYNEUROPATHY ASSOCIATED WITH TYPE 2 DIABETES MELLITUS (HCC): ICD-10-CM

## 2019-05-14 DIAGNOSIS — I10 BENIGN ESSENTIAL HYPERTENSION: Primary | ICD-10-CM

## 2019-05-14 DIAGNOSIS — E66.01 MORBID OBESITY DUE TO EXCESS CALORIES (HCC): ICD-10-CM

## 2019-05-14 PROCEDURE — 99214 OFFICE O/P EST MOD 30 MIN: CPT | Performed by: FAMILY MEDICINE

## 2019-08-12 DIAGNOSIS — E11.65 TYPE 2 DIABETES MELLITUS WITH HYPERGLYCEMIA, WITHOUT LONG-TERM CURRENT USE OF INSULIN (HCC): ICD-10-CM

## 2019-10-16 ENCOUNTER — APPOINTMENT (EMERGENCY)
Dept: RADIOLOGY | Facility: HOSPITAL | Age: 69
End: 2019-10-16
Payer: MEDICARE

## 2019-10-16 ENCOUNTER — HOSPITAL ENCOUNTER (OUTPATIENT)
Facility: HOSPITAL | Age: 69
Setting detail: OBSERVATION
Discharge: HOME | End: 2019-10-17
Attending: EMERGENCY MEDICINE | Admitting: INTERNAL MEDICINE
Payer: MEDICARE

## 2019-10-16 ENCOUNTER — OFFICE VISIT (OUTPATIENT)
Dept: URGENT CARE | Facility: CLINIC | Age: 69
End: 2019-10-16
Payer: MEDICARE

## 2019-10-16 VITALS
BODY MASS INDEX: 35.07 KG/M2 | SYSTOLIC BLOOD PRESSURE: 168 MMHG | RESPIRATION RATE: 20 BRPM | OXYGEN SATURATION: 98 % | HEART RATE: 121 BPM | WEIGHT: 245 LBS | TEMPERATURE: 98.8 F | DIASTOLIC BLOOD PRESSURE: 100 MMHG | HEIGHT: 70 IN

## 2019-10-16 DIAGNOSIS — R42 VERTIGO: Primary | ICD-10-CM

## 2019-10-16 DIAGNOSIS — R11.0 NAUSEA: ICD-10-CM

## 2019-10-16 DIAGNOSIS — R42 DIZZINESS: Primary | ICD-10-CM

## 2019-10-16 LAB
ALBUMIN SERPL BCP-MCNC: 4.6 G/DL (ref 3.5–5)
ALP SERPL-CCNC: 93 U/L (ref 46–116)
ALT SERPL W P-5'-P-CCNC: 33 U/L (ref 12–78)
ANION GAP SERPL CALCULATED.3IONS-SCNC: 11 MMOL/L (ref 4–13)
AST SERPL W P-5'-P-CCNC: 20 U/L (ref 5–45)
BASOPHILS # BLD AUTO: 0.05 THOUSANDS/ΜL (ref 0–0.1)
BASOPHILS NFR BLD AUTO: 0 % (ref 0–1)
BILIRUB SERPL-MCNC: 1.13 MG/DL (ref 0.2–1)
BUN SERPL-MCNC: 19 MG/DL (ref 5–25)
CALCIUM SERPL-MCNC: 9.8 MG/DL (ref 8.3–10.1)
CHLORIDE SERPL-SCNC: 101 MMOL/L (ref 100–108)
CO2 SERPL-SCNC: 27 MMOL/L (ref 21–32)
CREAT SERPL-MCNC: 1.22 MG/DL (ref 0.6–1.3)
EOSINOPHIL # BLD AUTO: 0.03 THOUSAND/ΜL (ref 0–0.61)
EOSINOPHIL NFR BLD AUTO: 0 % (ref 0–6)
ERYTHROCYTE [DISTWIDTH] IN BLOOD BY AUTOMATED COUNT: 13.5 % (ref 11.6–15.1)
GFR SERPL CREATININE-BSD FRML MDRD: 59 ML/MIN/1.73SQ M
GLUCOSE SERPL-MCNC: 152 MG/DL (ref 65–140)
HCT VFR BLD AUTO: 48.5 % (ref 36.5–49.3)
HGB BLD-MCNC: 16.2 G/DL (ref 12–17)
IMM GRANULOCYTES # BLD AUTO: 0.04 THOUSAND/UL (ref 0–0.2)
IMM GRANULOCYTES NFR BLD AUTO: 0 % (ref 0–2)
LYMPHOCYTES # BLD AUTO: 1.3 THOUSANDS/ΜL (ref 0.6–4.47)
LYMPHOCYTES NFR BLD AUTO: 10 % (ref 14–44)
MCH RBC QN AUTO: 29.6 PG (ref 26.8–34.3)
MCHC RBC AUTO-ENTMCNC: 33.4 G/DL (ref 31.4–37.4)
MCV RBC AUTO: 89 FL (ref 82–98)
MONOCYTES # BLD AUTO: 1.03 THOUSAND/ΜL (ref 0.17–1.22)
MONOCYTES NFR BLD AUTO: 8 % (ref 4–12)
NEUTROPHILS # BLD AUTO: 10.03 THOUSANDS/ΜL (ref 1.85–7.62)
NEUTS SEG NFR BLD AUTO: 82 % (ref 43–75)
NRBC BLD AUTO-RTO: 0 /100 WBCS
NT-PROBNP SERPL-MCNC: 70 PG/ML
PLATELET # BLD AUTO: 293 THOUSANDS/UL (ref 149–390)
PMV BLD AUTO: 9.5 FL (ref 8.9–12.7)
POTASSIUM SERPL-SCNC: 3.4 MMOL/L (ref 3.5–5.3)
PROT SERPL-MCNC: 8.3 G/DL (ref 6.4–8.2)
RBC # BLD AUTO: 5.48 MILLION/UL (ref 3.88–5.62)
SL AMB POCT GLUCOSE BLD: 150
SODIUM SERPL-SCNC: 139 MMOL/L (ref 136–145)
TROPONIN I SERPL-MCNC: <0.02 NG/ML
WBC # BLD AUTO: 12.48 THOUSAND/UL (ref 4.31–10.16)

## 2019-10-16 PROCEDURE — 36415 COLL VENOUS BLD VENIPUNCTURE: CPT

## 2019-10-16 PROCEDURE — G0463 HOSPITAL OUTPT CLINIC VISIT: HCPCS | Performed by: NURSE PRACTITIONER

## 2019-10-16 PROCEDURE — 80053 COMPREHEN METABOLIC PANEL: CPT | Performed by: EMERGENCY MEDICINE

## 2019-10-16 PROCEDURE — 93005 ELECTROCARDIOGRAM TRACING: CPT

## 2019-10-16 PROCEDURE — 70498 CT ANGIOGRAPHY NECK: CPT

## 2019-10-16 PROCEDURE — 1124F ACP DISCUSS-NO DSCNMKR DOCD: CPT | Performed by: EMERGENCY MEDICINE

## 2019-10-16 PROCEDURE — 83880 ASSAY OF NATRIURETIC PEPTIDE: CPT | Performed by: EMERGENCY MEDICINE

## 2019-10-16 PROCEDURE — 82948 REAGENT STRIP/BLOOD GLUCOSE: CPT | Performed by: NURSE PRACTITIONER

## 2019-10-16 PROCEDURE — 70496 CT ANGIOGRAPHY HEAD: CPT

## 2019-10-16 PROCEDURE — 84484 ASSAY OF TROPONIN QUANT: CPT | Performed by: EMERGENCY MEDICINE

## 2019-10-16 PROCEDURE — 85025 COMPLETE CBC W/AUTO DIFF WBC: CPT | Performed by: EMERGENCY MEDICINE

## 2019-10-16 PROCEDURE — 71046 X-RAY EXAM CHEST 2 VIEWS: CPT

## 2019-10-16 PROCEDURE — 96361 HYDRATE IV INFUSION ADD-ON: CPT

## 2019-10-16 PROCEDURE — 96374 THER/PROPH/DIAG INJ IV PUSH: CPT

## 2019-10-16 PROCEDURE — 99285 EMERGENCY DEPT VISIT HI MDM: CPT

## 2019-10-16 PROCEDURE — 99203 OFFICE O/P NEW LOW 30 MIN: CPT | Performed by: NURSE PRACTITIONER

## 2019-10-16 RX ORDER — MECLIZINE HYDROCHLORIDE 25 MG/1
25 TABLET ORAL ONCE
Status: COMPLETED | OUTPATIENT
Start: 2019-10-16 | End: 2019-10-16

## 2019-10-16 RX ORDER — ONDANSETRON 2 MG/ML
4 INJECTION INTRAMUSCULAR; INTRAVENOUS ONCE
Status: COMPLETED | OUTPATIENT
Start: 2019-10-16 | End: 2019-10-16

## 2019-10-16 RX ADMIN — ONDANSETRON 4 MG: 2 INJECTION INTRAMUSCULAR; INTRAVENOUS at 22:49

## 2019-10-16 RX ADMIN — SODIUM CHLORIDE 500 ML: 0.9 INJECTION, SOLUTION INTRAVENOUS at 22:18

## 2019-10-16 RX ADMIN — IOHEXOL 90 ML: 350 INJECTION, SOLUTION INTRAVENOUS at 22:39

## 2019-10-16 RX ADMIN — MECLIZINE HYDROCHLORIDE 25 MG: 25 TABLET ORAL at 22:49

## 2019-10-16 NOTE — PROGRESS NOTES
Assessment/Plan    Dizziness [R42]  1  Dizziness  Transfer to other facility   2  Nausea  POCT blood glucose         Subjective:     Patient ID: Deborah Sandesr is a 68 y o  male  Reason For Visit / Chief Complaint  Chief Complaint   Patient presents with    Dizziness     Pt has had dizziness with nausea and vomiting x 3 days  Pt denies vision changes  Pt is tachycardic at 121 and hypertensive at 168/100  Pt denies abdominal pain and SOB  Poor appetite  C/o pressure in head  Motor skill intact    This is a 59-year-old male patient who presents to the urgent care today  Patient is complaining of dizziness, nausea, vomiting and chills for approximately 3 days  Patient states when he has an episode of vomiting he does get severe abdominal pain  Patient denies chest pain or shortness of breath  Patient denies diarrhea  Patient denies recent travel or illness  Past Medical History:   Diagnosis Date    Cataract, left     last assessed [id number]    Epistaxis     last assessed 16Jan2017    Exertional shortness of breath     last assessed 16jan2018    IFG (impaired fasting glucose)     last assessed 01Apr2016       Past Surgical History:   Procedure Laterality Date    CATARACT EXTRACTION Left 07/01/2016    L eye cataract surgery - 7/16       Family History   Problem Relation Age of Onset    Stomach cancer Father     No Known Problems Mother        Review of Systems   Constitutional: Positive for chills  Negative for fever  HENT: Negative for congestion  Respiratory: Negative for apnea, cough, choking, chest tightness, shortness of breath, wheezing and stridor  Cardiovascular: Negative for chest pain  Gastrointestinal: Positive for abdominal pain, nausea and vomiting  Negative for diarrhea  Musculoskeletal: Negative for back pain and neck pain  Skin: Negative for color change  Neurological: Positive for dizziness   Negative for tremors, seizures, syncope, facial asymmetry, speech difficulty, weakness, light-headedness, numbness and headaches  Objective:    /100   Pulse (!) 121   Temp 98 8 °F (37 1 °C)   Resp 20   Ht 5' 10" (1 778 m)   Wt 111 kg (245 lb)   SpO2 98%   BMI 35 15 kg/m²     Physical Exam   Constitutional: He is oriented to person, place, and time  Vital signs are normal  He appears well-developed and well-nourished  HENT:   Head: Normocephalic and atraumatic  Cardiovascular: Normal rate, regular rhythm, S1 normal, S2 normal and normal heart sounds  Exam reveals no gallop and no friction rub  No murmur heard  Pulmonary/Chest: Effort normal and breath sounds normal  No stridor  No respiratory distress  He has no decreased breath sounds  He has no wheezes  He has no rhonchi  He has no rales  He exhibits no tenderness  Abdominal: Soft  There is no tenderness  Musculoskeletal: Normal range of motion  Neurological: He is alert and oriented to person, place, and time  Skin: Capillary refill takes less than 2 seconds  He is diaphoretic  There is pallor  Psychiatric: He has a normal mood and affect

## 2019-10-17 VITALS
OXYGEN SATURATION: 91 % | HEIGHT: 70 IN | WEIGHT: 244.93 LBS | TEMPERATURE: 98.1 F | DIASTOLIC BLOOD PRESSURE: 85 MMHG | BODY MASS INDEX: 35.07 KG/M2 | SYSTOLIC BLOOD PRESSURE: 160 MMHG | HEART RATE: 82 BPM | RESPIRATION RATE: 18 BRPM

## 2019-10-17 PROBLEM — R79.89 LOW TSH LEVEL: Status: ACTIVE | Noted: 2019-10-17

## 2019-10-17 PROBLEM — R42 DIZZINESS: Status: ACTIVE | Noted: 2019-10-17

## 2019-10-17 LAB
ANION GAP SERPL CALCULATED.3IONS-SCNC: 8 MMOL/L (ref 4–13)
ATRIAL RATE: 106 BPM
ATRIAL RATE: 67 BPM
ATRIAL RATE: 94 BPM
BUN SERPL-MCNC: 18 MG/DL (ref 5–25)
CALCIUM SERPL-MCNC: 9.1 MG/DL (ref 8.3–10.1)
CHLORIDE SERPL-SCNC: 103 MMOL/L (ref 100–108)
CO2 SERPL-SCNC: 26 MMOL/L (ref 21–32)
CREAT SERPL-MCNC: 1.13 MG/DL (ref 0.6–1.3)
ERYTHROCYTE [DISTWIDTH] IN BLOOD BY AUTOMATED COUNT: 13.6 % (ref 11.6–15.1)
GFR SERPL CREATININE-BSD FRML MDRD: 65 ML/MIN/1.73SQ M
GLUCOSE SERPL-MCNC: 141 MG/DL (ref 65–140)
GLUCOSE SERPL-MCNC: 150 MG/DL (ref 65–140)
GLUCOSE SERPL-MCNC: 161 MG/DL (ref 65–140)
GLUCOSE SERPL-MCNC: 192 MG/DL (ref 65–140)
GLUCOSE SERPL-MCNC: 194 MG/DL (ref 65–140)
HCT VFR BLD AUTO: 43 % (ref 36.5–49.3)
HGB BLD-MCNC: 13.8 G/DL (ref 12–17)
MCH RBC QN AUTO: 28.9 PG (ref 26.8–34.3)
MCHC RBC AUTO-ENTMCNC: 32.1 G/DL (ref 31.4–37.4)
MCV RBC AUTO: 90 FL (ref 82–98)
P AXIS: 24 DEGREES
P AXIS: 37 DEGREES
P AXIS: 55 DEGREES
PLATELET # BLD AUTO: 252 THOUSANDS/UL (ref 149–390)
PLATELET # BLD AUTO: 262 THOUSANDS/UL (ref 149–390)
PMV BLD AUTO: 9.6 FL (ref 8.9–12.7)
PMV BLD AUTO: 9.6 FL (ref 8.9–12.7)
POTASSIUM SERPL-SCNC: 3.8 MMOL/L (ref 3.5–5.3)
PR INTERVAL: 190 MS
PR INTERVAL: 192 MS
PR INTERVAL: 204 MS
QRS AXIS: -43 DEGREES
QRS AXIS: -80 DEGREES
QRS AXIS: -84 DEGREES
QRSD INTERVAL: 106 MS
QRSD INTERVAL: 88 MS
QRSD INTERVAL: 90 MS
QT INTERVAL: 322 MS
QT INTERVAL: 332 MS
QT INTERVAL: 434 MS
QTC INTERVAL: 402 MS
QTC INTERVAL: 430 MS
QTC INTERVAL: 458 MS
RBC # BLD AUTO: 4.78 MILLION/UL (ref 3.88–5.62)
SODIUM SERPL-SCNC: 137 MMOL/L (ref 136–145)
T WAVE AXIS: 10 DEGREES
T WAVE AXIS: 39 DEGREES
T WAVE AXIS: 9 DEGREES
T3FREE SERPL-MCNC: 1.81 PG/ML (ref 2.3–4.2)
T4 FREE SERPL-MCNC: 1.03 NG/DL (ref 0.76–1.46)
TROPONIN I SERPL-MCNC: <0.02 NG/ML
TROPONIN I SERPL-MCNC: <0.02 NG/ML
TSH SERPL DL<=0.05 MIU/L-ACNC: 0.34 UIU/ML (ref 0.36–3.74)
VENTRICULAR RATE: 101 BPM
VENTRICULAR RATE: 67 BPM
VENTRICULAR RATE: 94 BPM
VIT B12 SERPL-MCNC: 405 PG/ML (ref 100–900)
WBC # BLD AUTO: 11.93 THOUSAND/UL (ref 4.31–10.16)

## 2019-10-17 PROCEDURE — 93010 ELECTROCARDIOGRAM REPORT: CPT | Performed by: INTERNAL MEDICINE

## 2019-10-17 PROCEDURE — 80048 BASIC METABOLIC PNL TOTAL CA: CPT | Performed by: INTERNAL MEDICINE

## 2019-10-17 PROCEDURE — 93005 ELECTROCARDIOGRAM TRACING: CPT

## 2019-10-17 PROCEDURE — 99285 EMERGENCY DEPT VISIT HI MDM: CPT | Performed by: EMERGENCY MEDICINE

## 2019-10-17 PROCEDURE — G0008 ADMIN INFLUENZA VIRUS VAC: HCPCS | Performed by: INTERNAL MEDICINE

## 2019-10-17 PROCEDURE — 84443 ASSAY THYROID STIM HORMONE: CPT | Performed by: INTERNAL MEDICINE

## 2019-10-17 PROCEDURE — 84484 ASSAY OF TROPONIN QUANT: CPT | Performed by: INTERNAL MEDICINE

## 2019-10-17 PROCEDURE — 84439 ASSAY OF FREE THYROXINE: CPT | Performed by: INTERNAL MEDICINE

## 2019-10-17 PROCEDURE — 97163 PT EVAL HIGH COMPLEX 45 MIN: CPT

## 2019-10-17 PROCEDURE — 85049 AUTOMATED PLATELET COUNT: CPT | Performed by: INTERNAL MEDICINE

## 2019-10-17 PROCEDURE — 82948 REAGENT STRIP/BLOOD GLUCOSE: CPT

## 2019-10-17 PROCEDURE — 99235 HOSP IP/OBS SAME DATE MOD 70: CPT | Performed by: INTERNAL MEDICINE

## 2019-10-17 PROCEDURE — G8979 MOBILITY GOAL STATUS: HCPCS

## 2019-10-17 PROCEDURE — G8978 MOBILITY CURRENT STATUS: HCPCS

## 2019-10-17 PROCEDURE — 85027 COMPLETE CBC AUTOMATED: CPT | Performed by: INTERNAL MEDICINE

## 2019-10-17 PROCEDURE — 99220 PR INITIAL OBSERVATION CARE/DAY 70 MINUTES: CPT | Performed by: INTERNAL MEDICINE

## 2019-10-17 PROCEDURE — 90662 IIV NO PRSV INCREASED AG IM: CPT | Performed by: INTERNAL MEDICINE

## 2019-10-17 PROCEDURE — 82607 VITAMIN B-12: CPT | Performed by: INTERNAL MEDICINE

## 2019-10-17 PROCEDURE — 84481 FREE ASSAY (FT-3): CPT | Performed by: INTERNAL MEDICINE

## 2019-10-17 RX ORDER — ASPIRIN 325 MG
325 TABLET ORAL DAILY
Status: DISCONTINUED | OUTPATIENT
Start: 2019-10-17 | End: 2019-10-17 | Stop reason: HOSPADM

## 2019-10-17 RX ORDER — ONDANSETRON 2 MG/ML
4 INJECTION INTRAMUSCULAR; INTRAVENOUS EVERY 6 HOURS PRN
Status: DISCONTINUED | OUTPATIENT
Start: 2019-10-17 | End: 2019-10-17 | Stop reason: HOSPADM

## 2019-10-17 RX ORDER — VITAMIN E 268 MG
400 CAPSULE ORAL DAILY
Status: DISCONTINUED | OUTPATIENT
Start: 2019-10-17 | End: 2019-10-17 | Stop reason: HOSPADM

## 2019-10-17 RX ORDER — AMLODIPINE BESYLATE 5 MG/1
5 TABLET ORAL DAILY
Status: DISCONTINUED | OUTPATIENT
Start: 2019-10-17 | End: 2019-10-17 | Stop reason: HOSPADM

## 2019-10-17 RX ORDER — ATORVASTATIN CALCIUM 20 MG/1
20 TABLET, FILM COATED ORAL DAILY
Status: DISCONTINUED | OUTPATIENT
Start: 2019-10-17 | End: 2019-10-17 | Stop reason: HOSPADM

## 2019-10-17 RX ORDER — MELATONIN
1000 DAILY
Status: DISCONTINUED | OUTPATIENT
Start: 2019-10-17 | End: 2019-10-17 | Stop reason: HOSPADM

## 2019-10-17 RX ORDER — LATANOPROST 50 UG/ML
1 SOLUTION/ DROPS OPHTHALMIC
Status: DISCONTINUED | OUTPATIENT
Start: 2019-10-17 | End: 2019-10-17 | Stop reason: HOSPADM

## 2019-10-17 RX ORDER — SODIUM CHLORIDE, SODIUM LACTATE, POTASSIUM CHLORIDE, CALCIUM CHLORIDE 600; 310; 30; 20 MG/100ML; MG/100ML; MG/100ML; MG/100ML
100 INJECTION, SOLUTION INTRAVENOUS CONTINUOUS
Status: DISPENSED | OUTPATIENT
Start: 2019-10-17 | End: 2019-10-17

## 2019-10-17 RX ORDER — MECLIZINE HYDROCHLORIDE 25 MG/1
25 TABLET ORAL EVERY 8 HOURS PRN
Status: DISCONTINUED | OUTPATIENT
Start: 2019-10-17 | End: 2019-10-17 | Stop reason: HOSPADM

## 2019-10-17 RX ORDER — CHLORAL HYDRATE 500 MG
1000 CAPSULE ORAL 2 TIMES DAILY
Status: DISCONTINUED | OUTPATIENT
Start: 2019-10-17 | End: 2019-10-17 | Stop reason: HOSPADM

## 2019-10-17 RX ORDER — DIAZEPAM 5 MG/1
5 TABLET ORAL ONCE
Status: COMPLETED | OUTPATIENT
Start: 2019-10-17 | End: 2019-10-17

## 2019-10-17 RX ORDER — POTASSIUM CHLORIDE 20 MEQ/1
40 TABLET, EXTENDED RELEASE ORAL ONCE
Status: COMPLETED | OUTPATIENT
Start: 2019-10-17 | End: 2019-10-17

## 2019-10-17 RX ADMIN — Medication 400 UNITS: at 12:37

## 2019-10-17 RX ADMIN — ASPIRIN 325 MG ORAL TABLET 325 MG: 325 PILL ORAL at 09:37

## 2019-10-17 RX ADMIN — SODIUM CHLORIDE, SODIUM LACTATE, POTASSIUM CHLORIDE, AND CALCIUM CHLORIDE 100 ML/HR: .6; .31; .03; .02 INJECTION, SOLUTION INTRAVENOUS at 01:24

## 2019-10-17 RX ADMIN — POTASSIUM CHLORIDE 40 MEQ: 1500 TABLET, EXTENDED RELEASE ORAL at 01:29

## 2019-10-17 RX ADMIN — Medication 1000 MG: at 09:37

## 2019-10-17 RX ADMIN — Medication 1 TABLET: at 09:37

## 2019-10-17 RX ADMIN — METOPROLOL TARTRATE 12.5 MG: 25 TABLET ORAL at 09:39

## 2019-10-17 RX ADMIN — DIAZEPAM 5 MG: 5 TABLET ORAL at 00:26

## 2019-10-17 RX ADMIN — INFLUENZA A VIRUS A/MICHIGAN/45/2015 X-275 (H1N1) ANTIGEN (FORMALDEHYDE INACTIVATED), INFLUENZA A VIRUS A/SINGAPORE/INFIMH-16-0019/2016 IVR-186 (H3N2) ANTIGEN (FORMALDEHYDE INACTIVATED), AND INFLUENZA B VIRUS B/MARYLAND/15/2016 BX-69A (A B/COLORADO/6/2017-LIKE VIRUS) ANTIGEN (FORMALDEHYDE INACTIVATED) 0.5 ML: 60; 60; 60 INJECTION, SUSPENSION INTRAMUSCULAR at 09:38

## 2019-10-17 RX ADMIN — ENOXAPARIN SODIUM 40 MG: 40 INJECTION SUBCUTANEOUS at 09:36

## 2019-10-17 RX ADMIN — INSULIN LISPRO 2 UNITS: 100 INJECTION, SOLUTION INTRAVENOUS; SUBCUTANEOUS at 12:38

## 2019-10-17 RX ADMIN — MELATONIN 1000 UNITS: at 09:39

## 2019-10-17 RX ADMIN — AMLODIPINE BESYLATE 5 MG: 5 TABLET ORAL at 09:41

## 2019-10-17 RX ADMIN — LISINOPRIL: 20 TABLET ORAL at 09:41

## 2019-10-17 RX ADMIN — ATORVASTATIN CALCIUM 20 MG: 20 TABLET, FILM COATED ORAL at 09:38

## 2019-10-17 NOTE — PHYSICAL THERAPY NOTE
Physical Therapy Evaluation     Patient's Name: Janak Ashley    Admitting Diagnosis  Dizziness [R42]  Nausea [R11 0]  Vertigo [R42]    Problem List  Patient Active Problem List   Diagnosis    Abnormal EKG    Benign essential hypertension    PVC (premature ventricular contraction)    Elevated liver enzymes    Exertional shortness of breath    Fatty liver    Glaucoma    Mixed hyperlipidemia    Morbid obesity due to excess calories ([facility] )    Sinus tachycardia    Type 2 diabetes mellitus with hyperglycemia ([place] [address] )    Vitamin D deficiency    Hypoxia    Medicare annual wellness visit, subsequent    Hypersensitivity pneumonitis ([place] [address] )    Diabetic neuropathy associated with type 2 diabetes mellitus ([facility] )    Dizziness/Vertigo    Low TSH level       Past Medical History  Past Medical History:   Diagnosis Date    Cataract, left     last assessed [id number]    Epistaxis     last assessed [id number]    Exertional shortness of breath     last assessed 16jan2018    IFG (impaired fasting glucose)     last assessed 01Apr2016       Past Surgical History  Past Surgical History:   Procedure Laterality Date    CATARACT EXTRACTION Left 07/01/2016    L eye cataract surgery - 7/16        10/17/19 1100   Note Type   Note type Eval/Treat   Pain Assessment   Pain Assessment 0-10   Pain Score No Pain   Home Living   Type of [address] [place] [address] One level  (3-4 KHADRA )   Bathroom Shower/Tub Tub/shower unit   Additional Comments Pt reports he normally cares for wife, requires A for all IADLs   Prior Function   Level of [place] Independent with ADLs and functional mobility   Lives With Spouse   Receives Help From Family   ADL Assistance Independent   IADLs Independent   Falls in the last 6 months 0   Vocational Retired   Comments Pt reports son lives local and will be able to A prn   Restrictions/Precautions   Weight Bearing Precautions Per Order No   Other Precautions Fall Risk;Pain;Multiple lines   General Family/Caregiver Present Yes   Cognition   Orientation Level Oriented X4   RLE Assessment   RLE Assessment WFL   LLE Assessment   LLE Assessment WFL   Coordination   Movements are Fluid and Coordinated 1   Bed Mobility   Supine to Sit 5  Supervision   Additional items Assist x 1   Sit to Supine   (pt left resting in chair as requested)   Transfers   Sit to Stand 5  Supervision   Additional items Assist x 1   Stand to Sit 5  Supervision   Additional items Assist x 1   Ambulation/Elevation   Gait pattern Excessively slow; Shuffling;Decreased foot clearance; Improper Weight shift   Gait Assistance 4  Minimal assist   Additional items Assist x 1   Assistive Device Rolling walker   Distance 150   Balance   Static Sitting Fair +   Dynamic Sitting Fair   Static Standing Fair -   Ambulatory Poor +   Endurance Deficit   Endurance Deficit Yes   Endurance Deficit Description limted by fatigue, occasional mild dizziness   Activity Tolerance   Activity Tolerance Patient limited by fatigue;Treatment limited secondary to medical complications (Comment)   Medical Staff Made Aware yes   Nurse Made Aware yes, nsg gave clearance to work with pt   Assessment   Prognosis Good   Problem List Decreased strength;Decreased endurance; Impaired balance;Decreased mobility; Decreased safety awareness;Pain;Orthopedic restrictions   Assessment Pt is 68 y o  male seen for PT evaluation s/p admit to [facility] on 10/16/2019 w/ Dizziness  PT consulted to assess pt's functional mobility and d/c needs  Order placed for PT eval and tx, w/ activity as tolerated order  Comorbidities affecting pt's physical performance at time of assessment include: fatigue, DM, obesity, HTN  PTA, pt was ambulates unrestricted distances and all terrain   Personal factors affecting pt at time of IE include: ambulating w/ assistive device, stairs to enter home, inability to navigate level surfaces w/o external assistance, unable to perform dynamic tasks in community, limited home support, impulsivity, unable to perform physical activity, inability to perform IADLs and inability to perform ADLs  Please find objective findings from PT assessment regarding body systems outlined above with impairments and limitations including weakness, decreased ROM, impaired balance, decreased endurance, gait deviations, pain, decreased activity tolerance, decreased safety awareness, fall risk, orthopedic restrictions, SOB upon exertion and decreased cognition  PTA pt reports being I with all aspects of mobility  Pt is 1* caregiver for wife who requires A  Pt educated in pacing during mobility assessment  Tolerated supine> sit without increased A and no increased dizziness  Ambulated with slow mildly unsteady gait without LOB  Currently functioning below baseline mobility requiring AD  Educated in continued use of RW at this time to improve stability  Recommend OP PT for vertigo  The following objective measures performed on IE also reveal limitations: Barthel Index: 65/100  Pt's clinical presentation is currently unstable/unpredictable seen in pt's presentation of dizziness  Pt to benefit from continued PT tx to address deficits as defined above and maximize level of functional independent mobility and consistency  From PT/mobility standpoint, recommendation at time of d/c would be OP PT pending progress in order to facilitate return to PLOF  Barriers to Discharge Inaccessible home environment;Decreased caregiver support   Goals   Patient Goals To go home   STG Expiration Date 10/29/19   Short Term Goal #1 1  Complete bed mobility and transfers I to decrease need for caregiver in home  2  Ambulate 300' I to complete household and community mobility without A  3  Improve dynamic balance to good to decrease need for UE support during ambulation  4  Be educated & demonstate 4 steps to be able to enter home without A      Plan   Treatment/Interventions Endurance training;Patient/family training;Gait training;Bed mobility;LE strengthening/ROM; Functional transfer training;Spoke to MD;Spoke to nursing   PT Frequency Other (Comment)  (3-5x/wk)   Recommendation   Recommendation Home with family support   Equipment Recommended Walker   PT - OK to Discharge Yes   Barthel Index   Feeding 10   Bathing 0   Grooming Score 5   Dressing Score 5   Bladder Score 10   Bowels Score 10   Toilet Use Score 5   Transfers (Bed/Chair) Score 10   Mobility (Level Surface) Score 10   Stairs Score 0   Barthel Index Score 65           Edilma Cooney, PT

## 2019-10-17 NOTE — ED ATTENDING ATTESTATION
10/16/2019  I, Idris Mckee MD, saw and evaluated the patient  I have discussed the patient with the resident/non-physician practitioner and agree with the resident's/non-physician practitioner's findings, Plan of Care, and MDM as documented in the resident's/non-physician practitioner's note, except where noted  All available labs and Radiology studies were reviewed  I was present for key portions of any procedure(s) performed by the resident/non-physician practitioner and I was immediately available to provide assistance  At this point I agree with the current assessment done in the Emergency Department  I have conducted an independent evaluation of this patient a history and physical is as follows:    ED Course         Critical Care Time  Procedures     67 yo male with hx of htn, hld, tachycardia, hypoxia with no etiology, c/o three days of dizziness, lightheaded and then progressed to vertigo worse with walking  Pt with nausea vomiting last few days  Pt went to urgent care  Pt with nonproductive cough  No fever, no urinary complaints  Vss, afebrile, tachy, lungs cta, rrr, abdomen soft nontender, dry mucous membranes, no neuro deficits  Cardiac workup, urine, bnp, cta head and neck

## 2019-10-17 NOTE — PLAN OF CARE
Problem: Potential for Falls  Goal: Patient will remain free of falls  Description  INTERVENTIONS:  - Assess patient frequently for physical needs  -  Identify cognitive and physical deficits and behaviors that affect risk of falls    -  [place] fall precautions as indicated by assessment   - Educate patient/family on patient safety including physical limitations  - Instruct patient to call for assistance with activity based on assessment  - Modify environment to reduce risk of injury  - Consider OT/PT consult to assist with strengthening/mobility  Outcome: Progressing     Problem: SAFETY ADULT  Goal: Maintain or return to baseline ADL function  Description  INTERVENTIONS:  -  Assess patient's ability to carry out ADLs; assess patient's baseline for ADL function and identify physical deficits which impact ability to perform ADLs (bathing, care of mouth/teeth, toileting, grooming, dressing, etc )  - Assess/evaluate cause of self-care deficits   - Assess range of motion  - Assess patient's mobility; develop plan if impaired  - Assess patient's need for assistive devices and provide as appropriate  - Encourage maximum independence but intervene and supervise when necessary  - Involve family in performance of ADLs  - Assess for home care needs following discharge   - Consider OT consult to assist with ADL evaluation and planning for discharge  - Provide patient education as appropriate  Outcome: Progressing  Goal: Maintain or return mobility status to optimal level  Description  INTERVENTIONS:  - Assess patient's baseline mobility status (ambulation, transfers, stairs, etc )    - Identify cognitive and physical deficits and behaviors that affect mobility  - Identify mobility aids required to assist with transfers and/or ambulation (gait belt, sit-to-stand, lift, walker, cane, etc )  - [place] fall precautions as indicated by assessment  - Record patient progress and toleration of activity level on Mobility SBAR; progress patient to next Phase/Stage  - Instruct patient to call for assistance with activity based on assessment  - Consider rehabilitation consult to assist with strengthening/weightbearing, etc   Outcome: Progressing     Problem: PAIN - ADULT  Goal: Verbalizes/displays adequate comfort level or baseline comfort level  Description  Interventions:  - Encourage patient to monitor pain and request assistance  - Assess pain using appropriate pain scale  - Administer analgesics based on type and severity of pain and evaluate response  - Implement non-pharmacological measures as appropriate and evaluate response  - Consider cultural and social influences on pain and pain management  - Notify physician/advanced practitioner if interventions unsuccessful or patient reports new pain  Outcome: Progressing     Problem: INFECTION - ADULT  Goal: Absence or prevention of progression during hospitalization  Description  INTERVENTIONS:  - Assess and monitor for signs and symptoms of infection  - Monitor lab/diagnostic results  - Monitor all insertion sites, i e  indwelling lines, tubes, and drains  - Monitor endotracheal if appropriate and nasal secretions for changes in amount and color  - Charlo appropriate cooling/warming therapies per order  - Administer medications as ordered  - Instruct and encourage patient and family to use good hand hygiene technique  - Identify and instruct in appropriate isolation precautions for identified infection/condition  Outcome: Progressing  Goal: Absence of fever/infection during neutropenic period  Description  INTERVENTIONS:  - Monitor WBC    Outcome: Progressing     Problem: DISCHARGE PLANNING  Goal: Discharge to home or other facility with appropriate resources  Description  INTERVENTIONS:  - Identify barriers to discharge w/patient and caregiver  - Arrange for needed discharge resources and transportation as appropriate  - Identify discharge learning needs (meds, wound care, etc )  - Arrange for interpretive services to assist at discharge as needed  - Refer to Case Management Department for coordinating discharge planning if the patient needs post-hospital services based on physician/advanced practitioner order or complex needs related to functional status, cognitive ability, or social support system  Outcome: Progressing     Problem: Knowledge Deficit  Goal: Patient/family/caregiver demonstrates understanding of disease process, treatment plan, medications, and discharge instructions  Description  Complete learning assessment and assess knowledge base    Interventions:  - Provide teaching at level of understanding  - Provide teaching via preferred learning methods  Outcome: Progressing

## 2019-10-17 NOTE — UTILIZATION REVIEW
Initial Clinical Review    Admission: Date/Time/Statement: 10/17/2019  0004   Start of care 2036  Orders Placed This Encounter   Procedures    Place in Observation (expected length of stay for this patient is less than two midnights)     Standing Status:   Standing     Number of Occurrences:   1     Order Specific Question:   Admitting Physician     Answer:   Nick Sawant [[id number]]     Order Specific Question:   Level of Care     Answer:   Med Surg [[id number]]     ED Arrival Information     Expected Arrival Acuity Means of Arrival Escorted By Service Admission Type    10/16/2019  10/16/2019 20:10 Urgent Walk-In Family Member General Medicine Urgent    Arrival Complaint    dizziness        Chief Complaint   Patient presents with    Dizziness     pt reports dizziness for 2-3 days, nausea, unknown fever, coughing, no chest pain, SOb with exertion     Assessment/Plan: 68 y o  male who has past medical history significant for hypertension, type 2 diabetes, hyperlipidemia, hypersensitivity pneumonitis  He presents this evening with the complaint of worsening dizziness associated with nausea and vomiting over the past 3 days  He states this began suddenly without any obvious inciting factors, but did report some URI like symptoms prior to onset  Initially stated his symptoms began as a off balance sensation, but progressed into a room spinning sensation particularly while walking associated with multiple episodes of nausea and vomiting  Admits to poor appetite during this span and intermittent head pressure  Denies any known sick contacts or recent travel; denies fevers/chills, change in vision, tinnitus, chest pain/pressure, shortness of breath, or presyncope/syncope  Underwent CTA head/neck in the ED negative for acute intracranial pathology, however still with persistent symptoms despite IVF bolus, Zofran, meclizine, and Valium    Chest- no acute     Currently, patient is lying in bed, and states he is asymptomatic while lying down, but upon sitting up does experience a room spinning sensation, which persists even during my examination  Reports that despite the symptoms he is taking medications as directed  despite fluids, meclizine and zofran and valium he continues with dizzinessed and vertigo like symptoms  Plan is to cta of head and neck which is negative  Trend the troponins, check orthostatic vitals, supportive care with if , meclizine, zofran and pt          ED Triage Vitals [10/16/19 2019]   Temperature Pulse Respirations Blood Pressure SpO2   97 9 °F (36 6 °C) (!) 117 22 (!) 184/90 93 %      Temp Source Heart Rate Source Patient Position - Orthostatic VS BP Location FiO2 (%)   Oral Monitor Lying Right arm --      Pain Score       No Pain        Wt Readings from Last 1 Encounters:   10/16/19 111 kg (244 lb 14 9 oz)     Additional Vital Signs:   /17/19 0653        165/83      Standing - Orthostatic VS   10/17/19 0650  98 2 °F (36 8 °C)  85  18  137/64      Sitting - Orthostatic VS   10/17/19 0647        144/72      Lying - Orthostatic VS   10/17/19 0150  98 1 °F (36 7 °C)  98  18  155/81  90 %       10/16/19 2315    86  20  146/85  93 %  Nasal cannula  Lying   10/16/19 2215    92  20  131/83  94 %  Nasal cannula  Lying   10/16/19 2115    100  18  145/81  93 %  Nasal cannula  Lying       Pertinent Labs/Diagnostic Test Results:   Results from last 7 days   Lab Units 10/17/19  0521 10/17/19  0200 10/16/19  2036   WBC Thousand/uL 11 93*  --  12 48*   HEMOGLOBIN g/dL 13 8  --  16 2   HEMATOCRIT % 43 0  --  48 5   PLATELETS Thousands/uL 252 262 293   NEUTROS ABS Thousands/µL  --   --  10 03*         Results from last 7 days   Lab Units 10/17/19  0521 10/16/19  2036   SODIUM mmol/L 137 139   POTASSIUM mmol/L 3 8 3 4*   CHLORIDE mmol/L 103 101   CO2 mmol/L 26 27   ANION GAP mmol/L 8 11   BUN mg/dL 18 19   CREATININE mg/dL 1 13 1 22   EGFR ml/min/1 73sq m 65 59   CALCIUM mg/dL 9 1 9 8     Results from last 7 days   Lab Units 10/16/19  2036   AST U/L 20   ALT U/L 33   ALK PHOS U/L 93   TOTAL PROTEIN g/dL 8 3*   ALBUMIN g/dL 4 6   TOTAL BILIRUBIN mg/dL 1 13*     Results from last 7 days   Lab Units 10/17/19  0639 10/17/19  0129 10/16/19  1852   POC GLUCOSE mg/dl 141* 161* 150*     Results from last 7 days   Lab Units 10/17/19  0521 10/16/19  2036 10/16/19  1853   GLUCOSE RANDOM mg/dL 192* 152* 150     Results from last 7 days   Lab Units 10/17/19  0521 10/17/19  0200 10/16/19  2036   TROPONIN I ng/mL <0 02 <0 02 <0 02     Results from last 7 days   Lab Units 10/17/19  0521   TSH 3RD GENERATON uIU/mL 0 340*     Results from last 7 days   Lab Units 10/16/19  2036   NT-PRO BNP pg/mL 70       ED Treatment:   Medication Administration from 10/16/2019 2001 to 10/17/2019 0101       Date/Time Order Dose Route Action Comments     10/16/2019 2218 sodium chloride 0 9 % bolus 500 mL 500 mL Intravenous New Bag      10/16/2019 2239 iohexol (OMNIPAQUE) 350 MG/ML injection (MULTI-DOSE) 90 mL 90 mL Intravenous Given      10/16/2019 2249 meclizine (ANTIVERT) tablet 25 mg 25 mg Oral Given      10/16/2019 2249 ondansetron (ZOFRAN) injection 4 mg 4 mg Intravenous Given      10/17/2019 0026 diazepam (VALIUM) tablet 5 mg 5 mg Oral Given         Past Medical History:   Diagnosis Date    Cataract, left     last assessed [id number]    Epistaxis     last assessed [id number]    Exertional shortness of breath     last assessed [id number]    IFG (impaired fasting glucose)     last assessed [id number]     Present on Admission:   Benign essential hypertension   Type 2 diabetes mellitus with hyperglycemia (HCC)   Morbid obesity due to excess calories (HCC)   Dizziness/Vertigo      Admitting Diagnosis: Dizziness [R42]  Nausea [R11 0]  Vertigo [R42]  Age/Sex: 68 y o  male  Admission Orders:    Medications:  amLODIPine 5 mg Oral Daily   aspirin 325 mg Oral Daily   atorvastatin 20 mg Oral Daily   cholecalciferol 1,000 Units Oral Daily   enoxaparin 40 mg Subcutaneous Daily   fish oil 1,000 mg Oral BID   influenza vaccine 0 5 mL Intramuscular Once   insulin lispro 1-5 Units Subcutaneous HS   insulin lispro 1-6 Units Subcutaneous TID AC   latanoprost 1 drop Both Eyes HS   lisinopril-hydrochlorothiazide (PRINZIDE 20/25) combo dose  Oral Daily   metoprolol tartrate 12 5 mg Oral BID   multivitamin-minerals 1 tablet Oral Daily   vitamin E (tocopherol) 400 Units Oral Daily       lactated ringers 100 mL/hr Intravenous Continuous       meclizine 25 mg Oral Q8H PRN   ondansetron 4 mg Intravenous Q6H PRN     Ambulate 3x daily  fs glucose 3 x daily  Is q1   Orthostatic bp  scd  pt    Network Utilization Review Department  Phone: [phone]; Fax [phone]  [email or web address]  org  ATTENTION: Please call with any questions or concerns to [phone]  and carefully listen to the prompts so that you are directed to the right person  Send all requests for admission clinical reviews, approved or denied determinations and any other requests to fax [phone]   All voicemails are confidential

## 2019-10-17 NOTE — H&P
H&P- Rody Rather 1950, 68 y o  male MRN: [id number]    Unit/Bed#: -01 Encounter: [id number]    Primary Care Provider: Pieter Navarro MD   Date and time admitted to hospital: 10/16/2019  8:17 PM        * Dizziness/Vertigo  Assessment & Plan  · Reported dizziness of at least 3 days duration which progressed into vertigo like symptoms  Still persistent despite IV fluids, Zofran, meclizine, and Valium given in ED  · Did report some URI-like symptom onset, and did admit to recent poor p o  Intake  · Initial workup significant only for mild hypokalemia, CTA head/neck negative for any acute intracranial abnormalities or vascular abnormalities  · Trend 2 further troponin, check orthostatic vitals  · Supportive care with IVF overnight, p r n  Meclizine, p r n  Zofran  · PT consult    Type 2 diabetes mellitus with hyperglycemia (HCC)  Assessment & Plan  Lab Results   Component Value Date    HGBA1C 7 6 (H) 05/07/2019       Recent Labs     10/17/19  0129   POCGLU 161*       Blood Sugar Average: Last 72 hrs:  (P) 161   · Hold metformin  · Initiate SSI with carb controlled diet  · Periodic blood glucose monitoring, initiate hypoglycemic protocol    Morbid obesity due to excess calories (HCC)  Assessment & Plan  · Dietary and lifestyle modification discussed    Benign essential hypertension  Assessment & Plan  · Continue home antihypertensive therapy for now with hold parameters  · Blood pressure monitoring per protocol        VTE Prophylaxis: Enoxaparin (Lovenox)  / sequential compression device   Code Status: Level 1 - Full Code  POLST: POLST form is not discussed and not completed at this time  Anticipated Length of Stay:  Patient will be admitted on an Observation basis with an anticipated length of stay of  less than 2 midnights  Justification for Hospital Stay: Please see detailed plans noted above      Chief Complaint:     Dizziness/vertigo, nausea and vomiting  History of Present Illness:  Horace Miller is a 68 y o  male who has past medical history significant for hypertension, type 2 diabetes, hyperlipidemia, hypersensitivity pneumonitis  He presents this evening with the complaint of worsening dizziness associated with nausea and vomiting over the past 3 days  He states this began suddenly without any obvious inciting factors, but did report some URI like symptoms prior to onset  Initially stated his symptoms began as a off balance sensation, but progressed into a room spinning sensation particularly while walking associated with multiple episodes of nausea and vomiting  Admits to poor appetite during this span and intermittent head pressure  Denies any known sick contacts or recent travel; denies fevers/chills, change in vision, tinnitus, chest pain/pressure, shortness of breath, or presyncope/syncope  Underwent CTA head/neck in the ED negative for acute intracranial pathology, however still with persistent symptoms despite IVF bolus, Zofran, meclizine, and Valium  Currently, patient is lying in bed, and states he is asymptomatic while lying down, but upon sitting up does experience a room spinning sensation, which persists even during my examination  Reports that despite the symptoms he is taking medications as directed        Review of Systems:    Constitutional:  Denies fever or chills   Eyes:  Denies change in visual acuity   HENT:  Denies nasal congestion or sore throat   Respiratory:  Denies shortness of breath but endorses chronic cough  Cardiovascular:  Denies chest pain or edema   GI:  Denies abdominal pain, bloody stools or diarrhea but endorses nausea/vomiting  :  Denies dysuria   Musculoskeletal:  Denies back pain or joint pain   Integument:  Denies rash   Neurologic:  Denies headache, focal weakness or sensory changes but endorses dizziness/vertigo  Endocrine:  Denies polyuria or polydipsia   Lymphatic:  Denies swollen glands   Psychiatric:  Denies depression or anxiety     Past Medical and Surgical History:   Past Medical History:   Diagnosis Date    Cataract, left     last assessed [id number]    Epistaxis     last assessed [id number]    Exertional shortness of breath     last assessed 16jan2018    IFG (impaired fasting glucose)     last assessed 01Apr2016     Past Surgical History:   Procedure Laterality Date    CATARACT EXTRACTION Left 07/01/2016    L eye cataract surgery - 7/16       Meds/Allergies:  Medications Prior to Admission   Medication    amLODIPine (NORVASC) 5 mg tablet    aspirin 325 mg tablet    atorvastatin (LIPITOR) 20 mg tablet    cholecalciferol (VITAMIN D3) 1,000 units tablet    glucose blood (ONE TOUCH ULTRA TEST) test strip    latanoprost (XALATAN) 0 005 % ophthalmic solution    LIFESCAN UNISTIK II LANCETS MISC    lisinopril-hydrochlorothiazide (PRINZIDE,ZESTORETIC) 20-25 MG per tablet    metFORMIN (GLUCOPHAGE) 1000 MG tablet    metoprolol tartrate (LOPRESSOR) 25 mg tablet    Multiple Vitamins-Minerals (MULTIVITAL-M) TABS    Omega-3 Fatty Acids (FISH OIL) 1,000 mg    vitamin E, tocopherol, 400 units capsule       Allergies: No Known Allergies  History:  Marital Status: /Civil Union     Substance Use History:   Social History     Substance and Sexual Activity   Alcohol Use No    Frequency: Never    Drinks per session: 1 or 2    Binge frequency: Never     Social History     Tobacco Use   Smoking Status Never Smoker   Smokeless Tobacco Never Used     Social History     Substance and Sexual Activity   Drug Use Never       Family History:  Family History   Problem Relation Age of Onset    Stomach cancer Father     No Known Problems Mother        Physical Exam:     Vitals:   Blood Pressure: 155/81 (10/17/19 0150)  Pulse: 98 (10/17/19 0150)  Temperature: 98 1 °F (36 7 °C) (10/17/19 0150)  Temp Source: Oral (10/17/19 0150)  Respirations: 18 (10/17/19 0150)  Height: 5' 10" (177 8 cm) (10/16/19 2018)  Weight - Scale: 111 kg (244 lb 14 9 oz) (10/16/19 2018)  SpO2: 90 % (10/17/19 0150)    Constitutional:  Well developed, obese, no acute distress, non-toxic appearance   Eyes:  PERRL, conjunctiva normal   HENT:  Atraumatic, external ears normal, nose normal, mildly dry mucous membranes, no pharyngeal exudates  Neck- normal range of motion, no tenderness, supple   Respiratory:  No respiratory distress, normal breath sounds, no rales, no wheezing   Cardiovascular:  Normal rate, normal rhythm, no murmurs, no gallops, no rubs   GI:  Soft, nondistended, normal bowel sounds, nontender, no organomegaly, no mass, no rebound, no guarding   :  No costovertebral angle tenderness   Musculoskeletal:  No edema, no tenderness, no deformities  Back- no tenderness  Integument:  Well hydrated, no rash   Lymphatic:  No lymphadenopathy noted   Neurologic:  Alert &awake, communicative, CN 2-12 normal, normal motor function, normal sensory function, no focal deficits noted   Psychiatric:  Speech and behavior appropriate       Lab Results: I have personally reviewed pertinent reports  Results from last 7 days   Lab Units 10/16/19  2036   WBC Thousand/uL 12 48*   HEMOGLOBIN g/dL 16 2   HEMATOCRIT % 48 5   PLATELETS Thousands/uL 293   NEUTROS PCT % 82*   LYMPHS PCT % 10*   MONOS PCT % 8   EOS PCT % 0     Results from last 7 days   Lab Units 10/16/19  2036   POTASSIUM mmol/L 3 4*   CHLORIDE mmol/L 101   CO2 mmol/L 27   BUN mg/dL 19   CREATININE mg/dL 1 22   CALCIUM mg/dL 9 8   ALK PHOS U/L 93   ALT U/L 33   AST U/L 20           EKG: Sinus rhythm with occasional PACs    Imaging: I have personally reviewed pertinent reports  Cta Head And Neck With And Without Contrast    Result Date: 10/16/2019  Narrative: CTA NECK AND BRAIN WITH AND WITHOUT CONTRAST INDICATION: Vertigo COMPARISON:   None   TECHNIQUE:  Routine CT imaging of the Brain without contrast   Post contrast imaging was performed after administration of iodinated contrast through the neck and brain  Post contrast axial 0 625 mm images timed to opacify the arterial system  3D rendering was performed on an independent workstation  MIP reconstructions performed  Coronal reconstructions were performed of the noncontrast portion of the brain  Radiation dose length product (DLP) for this visit:  1522 48 mGy-cm   This examination, like all CT scans performed in the [facility], was performed utilizing techniques to minimize radiation dose exposure, including the use of iterative reconstruction and automated exposure control  IV Contrast:  90 mL of iohexol (OMNIPAQUE)  IMAGE QUALITY:   Diagnostic FINDINGS: NONCONTRAST BRAIN PARENCHYMA:  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  Periventricular and subcortical hypoattenuating foci consistent with mild microangiopathic disease  No acute intracranial hemorrhage or mass effect VENTRICLES AND EXTRA-AXIAL SPACES:  No hydrocephalus or extra-axial collection  VISUALIZED ORBITS AND PARANASAL SINUSES:  No retro-orbital mass  No paranasal sinus disease  CERVICAL VASCULATURE AORTIC ARCH AND GREAT VESSELS:  Three-vessel configuration aortic arch  No stenosis in the subclavian arteries  RIGHT VERTEBRAL ARTERY CERVICAL SEGMENT:  Calcified plaque at the origin without stenosis  The vessel is normal in caliber throughout the neck  LEFT VERTEBRAL ARTERY CERVICAL SEGMENT:  Calcified plaque obscures the origin  The vessel is normal in caliber throughout the neck  RIGHT EXTRACRANIAL CAROTID SEGMENT:  Normal caliber common carotid artery  Calcified plaque at the bifurcation without significant stenosis  Calcified and noncalcified plaque in the proximal internal carotid artery results in mild (less than 50%) stenosis  LEFT EXTRACRANIAL CAROTID SEGMENT:  Normal caliber common carotid artery  Calcified plaque at the bifurcation and internal carotid artery origin without significant stenosis   NASCET criteria was used to determine the degree of internal carotid artery diameter stenosis  INTRACRANIAL VASCULATURE INTERNAL CAROTID ARTERIES:  Calcified plaque throughout the carotid siphons without significant stenosis  Patent ophthalmic arteries  ANTERIOR CIRCULATION:  Symmetric A1 segments and anterior cerebral arteries with normal enhancement  Normal anterior communicating artery  MIDDLE CEREBRAL ARTERY CIRCULATION:  M1 segment and middle cerebral artery branches demonstrate normal enhancement bilaterally  DISTAL VERTEBRAL ARTERIES:  Normal distal vertebral arteries  Posterior inferior cerebellar arteries are patent  Normal vertebral basilar junction  BASILAR ARTERY:  Basilar artery is normal in caliber  Patent superior cerebellar arteries  POSTERIOR CEREBRAL ARTERIES: No stenosis in the posterior cerebral arteries  Posterior communicating arteries not visualized  DURAL VENOUS SINUSES:  Patent  NON VASCULAR ANATOMY BONY STRUCTURES:  No lytic or blastic lesion  SOFT TISSUES OF THE NECK:  No mass or lymphadenopathy  THORACIC INLET:  Clear lung apices  Impression: 1  No evidence of acute vascular territorial infarction, intracranial hemorrhage or mass effect  2   No hemodynamically significant stenosis, dissection or occlusion of the carotid or vertebral arteries or major vessels of the [place]  Workstation performed: [id number]         ** Please Note: Dragon 360 Dictation voice to text software was used in the creation of this document   **

## 2019-10-17 NOTE — PLAN OF CARE
Problem: Potential for Falls  Goal: Patient will remain free of falls  Description  INTERVENTIONS:  - Assess patient frequently for physical needs  -  Identify cognitive and physical deficits and behaviors that affect risk of falls    -  [place] fall precautions as indicated by assessment   - Educate patient/family on patient safety including physical limitations  - Instruct patient to call for assistance with activity based on assessment  - Modify environment to reduce risk of injury  - Consider OT/PT consult to assist with strengthening/mobility  Outcome: Progressing     Problem: SAFETY ADULT  Goal: Maintain or return to baseline ADL function  Description  INTERVENTIONS:  -  Assess patient's ability to carry out ADLs; assess patient's baseline for ADL function and identify physical deficits which impact ability to perform ADLs (bathing, care of mouth/teeth, toileting, grooming, dressing, etc )  - Assess/evaluate cause of self-care deficits   - Assess range of motion  - Assess patient's mobility; develop plan if impaired  - Assess patient's need for assistive devices and provide as appropriate  - Encourage maximum independence but intervene and supervise when necessary  - Involve family in performance of ADLs  - Assess for home care needs following discharge   - Consider OT consult to assist with ADL evaluation and planning for discharge  - Provide patient education as appropriate  Outcome: Progressing  Goal: Maintain or return mobility status to optimal level  Description  INTERVENTIONS:  - Assess patient's baseline mobility status (ambulation, transfers, stairs, etc )    - Identify cognitive and physical deficits and behaviors that affect mobility  - Identify mobility aids required to assist with transfers and/or ambulation (gait belt, sit-to-stand, lift, walker, cane, etc )  - [place] fall precautions as indicated by assessment  - Record patient progress and toleration of activity level on Mobility SBAR; progress patient to next Phase/Stage  - Instruct patient to call for assistance with activity based on assessment  - Consider rehabilitation consult to assist with strengthening/weightbearing, etc   Outcome: Progressing

## 2019-10-17 NOTE — ASSESSMENT & PLAN NOTE
Lab Results   Component Value Date    HGBA1C 7 6 (H) 05/07/2019       Recent Labs     10/16/19  1852 10/17/19  0129 10/17/19  0639   POCGLU 150* 161* 141*       Blood Sugar Average: Last 72 hrs:  (P) 151   · Hold metformin  · Initiate SSI with carb controlled diet  · Periodic blood glucose monitoring, initiate hypoglycemic protocol

## 2019-10-17 NOTE — ASSESSMENT & PLAN NOTE
· Continue home antihypertensive therapy for now with hold parameters  · Blood pressure monitoring per protocol

## 2019-10-17 NOTE — ED NOTES
Patient's O2 level 88-89% on room air; placed on 2L NC for support   Patient reports he usually is in the low 320 Rosio Bo RN  10/16/19 0273

## 2019-10-17 NOTE — SOCIAL WORK
CM met with patient and explained CM role  Patient lives in a 1 floor 3 [facility] home  Patient drives, independent w/ ADL's and drives  Son Dali Stewart, phone number [phone] to transport at discharge today  Patient given out patient PT script  Patient denies hx of HHC, STR, MH and drugs/etoh  Patient uses [id number] [facility] in [facility]  PCP is Dr Crystal ABBASI  CM reviewed d/c planning process including the following: identifying help at home, patient preference for d/c planning needs, Discharge Lounge, [facility] Meds to Bed program, availability of treatment team to discuss questions or concerns patient and/or family may have regarding understanding medications and recognizing signs and symptoms once discharged  CM also encouraged patient to follow up with all recommended appointments after discharge  Patient advised of importance for patient and family to participate in managing patients medical well being

## 2019-10-17 NOTE — ED PROVIDER NOTES
History  Chief Complaint   Patient presents with    Dizziness     pt reports dizziness for 2-3 days, nausea, unknown fever, coughing, no chest pain, SOb with exertion     70-year-old man presents for evaluation of vertigo  Patient states that the symptoms for 3 days ago he initially felt lightheaded like is going to pass out is now progressed to room spinning sensation  It is worse with movement and relieved with rest but is constant  He has not taken any medication for the symptoms  He went to a urgent care today but was sent here perform more thorough evaluation  He has vomited multiple times over the past 3 days  All the symptoms have gradually worsened including vomiting  He denies fever, chills  He denies change in vision, hearing  He has had intermittent headaches that he describes as a fullness she denies shortness of breath, chest pain, cough, neck pain, back pain, abdominal pain, dysuria  He denies numbness, weakness, tingling in his extremities  Prior to Admission Medications   Prescriptions Last Dose Informant Patient Reported? Taking?    LIFESCAN UNISTIK II LANCETS MISC  Self No Yes   Sig: by Does not apply route 3 (three) times a day for 90 days   Multiple Vitamins-Minerals (MULTIVITAL-M) TABS  Self Yes Yes   Sig: Take 1 tablet by mouth daily   Omega-3 Fatty Acids (FISH OIL) 1,000 mg  Self Yes Yes   Sig: Take by mouth 2 (two) times a day   amLODIPine (NORVASC) 5 mg tablet  Self No Yes   Sig: Take 1 tablet (5 mg total) by mouth daily   aspirin 325 mg tablet  Self Yes Yes   Sig: Take 1 tablet by mouth every 4 (four) hours as needed   atorvastatin (LIPITOR) 20 mg tablet  Self No Yes   Sig: Take 1 tablet (20 mg total) by mouth daily for 90 days   cholecalciferol (VITAMIN D3) 1,000 units tablet  Self Yes Yes   Sig: Take 1 tablet by mouth daily   glucose blood (ONE TOUCH ULTRA TEST) test strip  Self No Yes   Sig: Test sugar 2-3 times daily   latanoprost (XALATAN) 0 005 % ophthalmic solution  Self Yes Yes   Sig: Apply to eye   lisinopril-hydrochlorothiazide (PRINZIDE,ZESTORETIC) 20-25 MG per tablet  Self No Yes   Sig: TAKE ONE TABLET BY MOUTH ONCE DAILY   metFORMIN (GLUCOPHAGE) 1000 MG tablet   No Yes   Sig: Take 1 tablet (1,000 mg total) by mouth 2 (two) times a day for 336 days   metoprolol tartrate (LOPRESSOR) 25 mg tablet  Self No Yes   Sig: Take 0 5 tablets (12 5 mg total) by mouth 2 (two) times a day for 90 days   vitamin E, tocopherol, 400 units capsule  Self Yes Yes   Sig: Take by mouth daily      Facility-Administered Medications: None       Past Medical History:   Diagnosis Date    Cataract, left     last assessed [id number]    Epistaxis     last assessed 16Jan2017    Exertional shortness of breath     last assessed 16jan2018    IFG (impaired fasting glucose)     last assessed 01Apr2016       Past Surgical History:   Procedure Laterality Date    CATARACT EXTRACTION Left 07/01/2016    L eye cataract surgery - 7/16       Family History   Problem Relation Age of Onset    Stomach cancer Father     No Known Problems Mother      I have reviewed and agree with the history as documented  Social History     Tobacco Use    Smoking status: Never Smoker    Smokeless tobacco: Never Used   Substance Use Topics    Alcohol use: No     Frequency: Never     Drinks per session: 1 or 2     Binge frequency: Never    Drug use: Never        Review of Systems   Constitutional: Negative for appetite change, chills, diaphoresis, fatigue and fever  HENT: Negative for congestion, rhinorrhea and sore throat  Respiratory: Negative for apnea, cough, choking, chest tightness, shortness of breath, wheezing and stridor  Cardiovascular: Negative for chest pain, palpitations and leg swelling  Gastrointestinal: Positive for vomiting  Negative for abdominal distention, abdominal pain, constipation, diarrhea and nausea  Genitourinary: Negative for dysuria and hematuria     Musculoskeletal: Negative for back pain, neck pain and neck stiffness  Skin: Negative for pallor, rash and wound  Neurological: Positive for dizziness and light-headedness  Negative for headaches  Psychiatric/Behavioral: Negative for behavioral problems and confusion  Physical Exam  ED Triage Vitals [10/16/19 2019]   Temperature Pulse Respirations Blood Pressure SpO2   97 9 °F (36 6 °C) (!) 117 22 (!) 184/90 93 %      Temp Source Heart Rate Source Patient Position - Orthostatic VS BP Location FiO2 (%)   Oral Monitor Lying Right arm --      Pain Score       No Pain             Orthostatic Vital Signs  Vitals:    10/17/19 0647 10/17/19 0650 10/17/19 0653 10/17/19 1446   BP: 144/72 137/64 165/83 160/85   Pulse:  85  82   Patient Position - Orthostatic VS: Lying - Orthostatic VS Sitting - Orthostatic VS Standing - Orthostatic VS Lying       Physical Exam   Constitutional: He is oriented to person, place, and time  He appears well-developed and well-nourished  No distress  HENT:   Head: Normocephalic and atraumatic  Eyes: Pupils are equal, round, and reactive to light  Conjunctivae and EOM are normal  No scleral icterus  Right eye exhibits normal extraocular motion and no nystagmus  Left eye exhibits normal extraocular motion and no nystagmus  Neck: Normal range of motion  Neck supple  Cardiovascular: Normal rate, regular rhythm and normal heart sounds  No murmur heard  Pulmonary/Chest: Effort normal and breath sounds normal  No respiratory distress  He has no wheezes  Abdominal: Soft  Bowel sounds are normal  He exhibits no distension  There is no tenderness  Musculoskeletal: Normal range of motion  He exhibits no edema  Neurological: He is alert and oriented to person, place, and time  He has normal strength  He displays normal reflexes  No cranial nerve deficit or sensory deficit  He exhibits normal muscle tone  He displays a negative Romberg sign  Coordination and gait normal  GCS eye subscore is 4   GCS verbal subscore is 5  GCS motor subscore is 6  HINTS not indicative of peripheral process  Skin: Skin is warm and dry  No rash noted  He is not diaphoretic  Psychiatric: He has a normal mood and affect  His behavior is normal    Nursing note and vitals reviewed        ED Medications  Medications   lactated ringers infusion (100 mL/hr Intravenous New Bag 10/17/19 0124)   sodium chloride 0 9 % bolus 500 mL (0 mL Intravenous Stopped 10/16/19 2318)   iohexol (OMNIPAQUE) 350 MG/ML injection (MULTI-DOSE) 90 mL (90 mL Intravenous Given 10/16/19 2239)   meclizine (ANTIVERT) tablet 25 mg (25 mg Oral Given 10/16/19 2249)   ondansetron (ZOFRAN) injection 4 mg (4 mg Intravenous Given 10/16/19 2249)   diazepam (VALIUM) tablet 5 mg (5 mg Oral Given 10/17/19 0026)   potassium chloride (K-DUR,KLOR-CON) CR tablet 40 mEq (40 mEq Oral Given 10/17/19 0129)   influenza vaccine, high-dose (FLUZONE HIGH-DOSE) IM injection BRIDGET 0 5 mL (0 5 mL Intramuscular Given 10/17/19 0938)       Diagnostic Studies  Results Reviewed     Procedure Component Value Units Date/Time    NT-BNP PRO [[id number]]  (Normal) Collected:  10/16/19 2036    Lab Status:  Final result Specimen:  Blood from Arm, Left Updated:  10/16/19 2255     NT-proBNP 70 pg/mL     Troponin I [[id number]]  (Normal) Collected:  10/16/19 2036    Lab Status:  Final result Specimen:  Blood from Arm, Left Updated:  10/16/19 2106     Troponin I <0 02 ng/mL     Comprehensive metabolic panel [[id number]]  (Abnormal) Collected:  10/16/19 2036    Lab Status:  Final result Specimen:  Blood from Arm, Left Updated:  10/16/19 2105     Sodium 139 mmol/L      Potassium 3 4 mmol/L      Chloride 101 mmol/L      CO2 27 mmol/L      ANION GAP 11 mmol/L      BUN 19 mg/dL      Creatinine 1 22 mg/dL      Glucose 152 mg/dL      Calcium 9 8 mg/dL      AST 20 U/L      ALT 33 U/L      Alkaline Phosphatase 93 U/L      Total Protein 8 3 g/dL      Albumin 4 6 g/dL      Total Bilirubin 1 13 mg/dL      eGFR 59 ml/min/1 73sq m     Narrative:       National Kidney Disease Foundation guidelines for Chronic Kidney Disease (CKD):     Stage 1 with normal or high GFR (GFR > 90 mL/min/1 73 square meters)    Stage 2 Mild CKD (GFR = 60-89 mL/min/1 73 square meters)    Stage 3A Moderate CKD (GFR = 45-59 mL/min/1 73 square meters)    Stage 3B Moderate CKD (GFR = 30-44 mL/min/1 73 square meters)    Stage 4 Severe CKD (GFR = 15-29 mL/min/1 73 square meters)    Stage 5 End Stage CKD (GFR <15 mL/min/1 73 square meters)  Note: GFR calculation is accurate only with a steady state creatinine    CBC and differential [[id number]]  (Abnormal) Collected:  10/16/19 2036    Lab Status:  Final result Specimen:  Blood from Arm, Left Updated:  10/16/19 2046     WBC 12 48 Thousand/uL      RBC 5 48 Million/uL      Hemoglobin 16 2 g/dL      Hematocrit 48 5 %      MCV 89 fL      MCH 29 6 pg      MCHC 33 4 g/dL      RDW 13 5 %      MPV 9 5 fL      Platelets 676 Thousands/uL      nRBC 0 /100 WBCs      Neutrophils Relative 82 %      Immat GRANS % 0 %      Lymphocytes Relative 10 %      Monocytes Relative 8 %      Eosinophils Relative 0 %      Basophils Relative 0 %      Neutrophils Absolute 10 03 Thousands/µL      Immature Grans Absolute 0 04 Thousand/uL      Lymphocytes Absolute 1 30 Thousands/µL      Monocytes Absolute 1 03 Thousand/µL      Eosinophils Absolute 0 03 Thousand/µL      Basophils Absolute 0 05 Thousands/µL                  CTA head and neck with and without contrast   Final Result by Meera Judd MD (10/16 0721)         1  No evidence of acute vascular territorial infarction, intracranial hemorrhage or mass effect  2   No hemodynamically significant stenosis, dissection or occlusion of the carotid or vertebral arteries or major vessels of the [place]  Workstation performed: [id number]         XR chest 2 views   Final Result by Ro Jones MD (26/16 3945)      No acute cardiopulmonary disease  Workstation performed: [id number]               Procedures  ECG 12 Lead Documentation Only  Date/Time: 10/16/2019 11:59 AM  Performed by: Michele Navarrete MD  Authorized by: Michele Navarrete MD     Indications / Diagnosis:  Dizziness  ECG reviewed by me, the ED Provider: yes    Patient location:  ED  Interpretation:     Interpretation: non-specific    Rate:     ECG rate:  101    ECG rate assessment: normal    Rhythm:     Rhythm: sinus rhythm    Ectopy:     Ectopy: PAC    QRS:     QRS axis:  Left    QRS intervals:  Normal  Conduction:     Conduction: normal    ST segments:     ST segments:  Normal  T waves:     T waves: normal              ED Course  ED Course as of Oct 20 1033   Wed Oct 16, 2019   2205 WBC(!): 12 48   2205 Pulse(!): 117           Identification of Seniors at Risk      Most Recent Value   (ISAR) Identification of Seniors at Risk   Before the illness or injury that brought you to the Emergency, did you need someone to help you on a regular basis? 0 Filed at: 10/16/2019 2019   In the last 24 hours, have you needed more help than usual?  0 Filed at: 10/16/2019 2019   Have you been hospitalized for one or more nights during the past 6 months? 0 Filed at: 10/16/2019 2019   In general, do you see well?  0 Filed at: 10/16/2019 2019   In general, do you have serious problems with your memory? 0 Filed at: 10/16/2019 2019   Do you take more than three different medications every day? 1 Filed at: 10/16/2019 2019   ISAR Score  1 Filed at: 10/16/2019 2019                          MDM  Number of Diagnoses or Management Options  Nausea: new and requires workup  Vertigo: new and requires workup  Diagnosis management comments: 68year old man presents with vertigo  Symptoms gradually worsening over the past 3 days  He has had multiple episodes of vomiting as well  Differential includes central versus peripheral process    Patient has a normal neurologic exam   Hence exam does not show any evidence of peripheral process  There for will order CT head and neck, cardiac workup  Will treat symptoms with Zofran, meclizine  Reassess  Symptoms persisted following initial treatment  CTA not concerning for acute stroke  Symptoms possibly related to recent viral illness but etiology unclear at this time  Will give Valium for vertigo and admit to slim for observation  Amount and/or Complexity of Data Reviewed  Clinical lab tests: ordered and reviewed  Decide to obtain previous medical records or to obtain history from someone other than the patient: yes  Obtain history from someone other than the patient: yes  Review and summarize past medical records: yes  Discuss the patient with other providers: yes  Independent visualization of images, tracings, or specimens: yes    Risk of Complications, Morbidity, and/or Mortality  Presenting problems: moderate  Diagnostic procedures: low  Management options: low    Patient Progress  Patient progress: stable      Disposition  Final diagnoses:   Vertigo   Nausea     Time reflects when diagnosis was documented in both MDM as applicable and the Disposition within this note     Time User Action Codes Description Comment    10/17/2019 12:21 AM Eva Alarcon Add [R42] Vertigo     10/17/2019 12:21 AM Eva Alarcon Add [R11 0] Nausea       ED Disposition     ED Disposition Condition Date/Time Comment    Admit Stable Thu Oct 17, 2019 12:04 AM Case was discussed with IDRIS and the patient's admission status was agreed to be Admission Status: observation status to the service of Dr Apolinar Gallagher          Follow-up Information     Follow up With Specialties Details Why Contact Info    Ayo Palma MD Family Medicine Follow up  [address] [facility]  [phone]            Discharge Medication List as of 10/17/2019  2:43 PM      CONTINUE these medications which have NOT CHANGED    Details   amLODIPine (NORVASC) 5 mg tablet Take 1 tablet (5 mg total) by mouth daily, Starting Mon 4/29/2019, Normal      aspirin 325 mg tablet Take 1 tablet by mouth every 4 (four) hours as needed, Starting Mon 7/17/2017, Historical Med      atorvastatin (LIPITOR) 20 mg tablet Take 1 tablet (20 mg total) by mouth daily for 90 days, Starting Tue 3/12/2019, Until Wed 10/16/2019, Normal      cholecalciferol (VITAMIN D3) 1,000 units tablet Take 1 tablet by mouth daily, Starting Tue 1/16/2018, Historical Med      glucose blood (ONE TOUCH ULTRA TEST) test strip Test sugar 2-3 times daily, Normal      latanoprost (XALATAN) 0 005 % ophthalmic solution Apply to eye, Starting Fri 6/10/2016, Historical Med      LIFESCAN UNISTIK II LANCETS MISC by Does not apply route 3 (three) times a day for 90 days, Starting Tue 6/12/2018, Until Wed 10/16/2019, Normal      lisinopril-hydrochlorothiazide (PRINZIDE,ZESTORETIC) 20-25 MG per tablet TAKE ONE TABLET BY MOUTH ONCE DAILY, Normal      metFORMIN (GLUCOPHAGE) 1000 MG tablet Take 1 tablet (1,000 mg total) by mouth 2 (two) times a day for 336 days, Starting Mon 8/12/2019, Until Mon 7/13/2020, Normal      metoprolol tartrate (LOPRESSOR) 25 mg tablet Take 0 5 tablets (12 5 mg total) by mouth 2 (two) times a day for 90 days, Starting Tue 3/12/2019, Until Wed 10/16/2019, Normal      Multiple Vitamins-Minerals (MULTIVITAL-M) TABS Take 1 tablet by mouth daily, Starting Fri 4/1/2016, Historical Med      Omega-3 Fatty Acids (FISH OIL) 1,000 mg Take by mouth 2 (two) times a day, Starting Fri 4/1/2016, Historical Med      vitamin E, tocopherol, 400 units capsule Take by mouth daily, Starting Fri 6/10/2016, Historical Med           Outpatient Discharge Orders   Discharge Diet     Activity as tolerated       ED Provider  Attending physically available and evaluated Ranjit Senior I managed the patient along with the ED Attending      Electronically Signed by         Jenn Beckwith MD  10/20/19 6170

## 2019-10-17 NOTE — ASSESSMENT & PLAN NOTE
· Reported dizziness of at least 3 days duration which progressed into vertigo like symptoms  · Seems more like vestibular neuritis after viral upper respiratory tract infection  · Symptoms are already improving and dizziness is much less today as compared to before  He reports that he is able to walk with a walker today  · CTA head/neck negative for any acute intracranial abnormalities or vascular abnormalities  · Orthostatic vitals negative for orthostatic hypotension  · Received IV hydration overnight  · Consider steroid course if symptoms recur or persist, patient wants to hold off on any medications for now as symptoms are already getting better  · Supportive care with p r n  Meclizine and Zofran if needed  · PT evaluated the patient, official note pending but I discussed with them and they recommend outpatient therapy with walker use  updated  about this

## 2019-10-17 NOTE — DISCHARGE SUMMARY
Discharge- Starla Johnson 1950, 68 y o  male MRN: [id number]    Unit/Bed#: -01 Encounter: [id number]    Primary Care Provider: Bernardino Ball MD   Date and time admitted to hospital: 10/16/2019  8:17 PM        * Dizziness/Vertigo  Assessment & Plan  · Reported dizziness of at least 3 days duration which progressed into vertigo like symptoms  · Seems more like vestibular neuritis after viral upper respiratory tract infection  · Symptoms are already improving and dizziness is much less today as compared to before  He reports that he is able to walk with a walker today  · CTA head/neck negative for any acute intracranial abnormalities or vascular abnormalities  · Orthostatic vitals negative for orthostatic hypotension  · Received IV hydration overnight  · Consider steroid course if symptoms recur or persist, patient wants to hold off on any medications for now as symptoms are already getting better  · Supportive care with p r n  Meclizine and Zofran if needed  · PT evaluated the patient, official note pending but I discussed with them and they recommend outpatient therapy with walker use  updated  about this  Low TSH level  Assessment & Plan  · Borderline low TSH levels  · Free T4 and free T3 not consistent with hyperthyroid state  · No significant symptoms of hyperthyroidism  · Recommend repeating TFTs in 4-6 weeks      Type 2 diabetes mellitus with hyperglycemia [facility])  Assessment & Plan  Lab Results   Component Value Date    HGBA1C 7 6 (H) 05/07/2019       Recent Labs     10/16/19  1852 10/17/19  0129 10/17/19  0639   POCGLU 150* 161* 141*       Blood Sugar Average: Last 72 hrs:  (P) 151   · Hold metformin  · Initiate SSI with carb controlled diet  · Periodic blood glucose monitoring, initiate hypoglycemic protocol    Morbid obesity due to excess calories (HCC)  Assessment & Plan  · Dietary and lifestyle modification discussed    Benign essential hypertension  Assessment & Plan  · Continue home antihypertensive therapy for now with hold parameters  · Blood pressure monitoring per protocol      Discharge Summary - [facility]    Patient Information: Maria Del Carmen Johnson 68 y o  male MRN: [id number]  Unit/Bed#: -01 Encounter: [id number]    Discharging Physician / Practitioner: Donal Valdes MD  PCP: Beth Voss MD  Admission Date: 10/16/2019  Discharge Date: 10/17/19    Reason for Admission:  Dizziness and vertigo    Discharge Diagnoses:     Principal Problem:    Dizziness/Vertigo  Active Problems:    Benign essential hypertension    Morbid obesity due to excess calories ([address] )    Type 2 diabetes mellitus with hyperglycemia ([facility] )    Low TSH level  Resolved Problems:    * No resolved hospital problems  *        Significant Findings / Test Results:     CTA head and neck with and without contrast   Final Result by Korina Mc MD (10/16 2056)         1  No evidence of acute vascular territorial infarction, intracranial hemorrhage or mass effect  2   No hemodynamically significant stenosis, dissection or occlusion of the carotid or vertebral arteries or major vessels of the [place]  Workstation performed: [id number]         XR chest 2 views   Final Result by Bibi Rodriguez MD (09/59 0466)      No acute cardiopulmonary disease  Workstation performed: [place] [facility] Course:     Maria Del Carmen Johnson is a 68 y o  male patient who originally presented to the hospital on 10/16/2019 due to dizziness and vertigo after recent viral upper respiratory tract infection  CTA was negative for any significant vascular occlusion, stenosis or dissection and also negative for any infarction, intracranial bleed or mass effect  His TSH was borderline low with normal free T4 and borderline low T3  Repeat thyroid function test is recommended outpatient in 4 weeks    Patient preferred to be not on any medications for his dizziness as symptoms are already improving  PT evaluated the patient and recommended outpatient PT with walker   discussed this with the patient  Please refer to detailed assessment and plan above for further details  Condition at Discharge: fair     Discharge Day Visit / Exam:     Subjective:  No overnight events reported  Patient was seen walking around with physical therapy with walker without any significant symptoms  After that, had a detailed discussion with the patient about his symptoms  He reports that his dizziness is already improving and is much better as compared to before  Now he is able to sit up without any dizziness and was able to walk without any significant symptoms with a walker  Denies any fever, chills, focal weakness or numbness  Reports mild headache but tolerable without any medications  He is okay with discharge plan today and would like to avoid new medications as much as possible  Vitals: Blood Pressure: 165/83 (10/17/19 0653)  Pulse: 85 (10/17/19 0650)  Temperature: 98 2 °F (36 8 °C) (10/17/19 0650)  Temp Source: Oral (10/17/19 0650)  Respirations: 18 (10/17/19 0650)  Height: 5' 10" (177 8 cm) (10/16/19 2018)  Weight - Scale: 111 kg (244 lb 14 9 oz) (10/16/19 2018)  SpO2: 90 % (10/17/19 0150)  Exam:   Physical Exam   Constitutional: He is oriented to person, place, and time  No distress  Eyes: Pupils are equal, round, and reactive to light  Cardiovascular: Normal rate, regular rhythm and normal heart sounds  No murmur heard  Pulmonary/Chest: Effort normal and breath sounds normal  No respiratory distress  He has no wheezes  He has no rales  Abdominal: Soft  Bowel sounds are normal  He exhibits no distension  There is no tenderness  Musculoskeletal: He exhibits no edema  Neurological: He is alert and oriented to person, place, and time  Squeezes fingers bilaterally and moves bilateral lower extremity  No nystagmus   Skin: Skin is warm           Discharge instructions/Information to patient and family:   See after visit summary for information provided to patient and family  Provisions for Follow-Up Care:  See after visit summary for information related to follow-up care and any pertinent home health orders  Disposition:     Home       Discharge Statement:  I spent 40 minutes discharging the patient  This time was spent on the day of discharge  I had direct contact with the patient on the day of discharge  Greater than 50% of the total time was spent examining patient, answering all patient questions, arranging and discussing plan of care with patient as well as directly providing post-discharge instructions  Additional time then spent on discharge activities  Discharge Medications:  See after visit summary for reconciled discharge medications provided to patient and family        ** Please Note: This note has been constructed using a voice recognition system **

## 2019-10-17 NOTE — ASSESSMENT & PLAN NOTE
Lab Results   Component Value Date    HGBA1C 7 6 (H) 05/07/2019       Recent Labs     10/17/19  0129   POCGLU 161*       Blood Sugar Average: Last 72 hrs:  (P) 161   · Hold metformin  · Initiate SSI with carb controlled diet  · Periodic blood glucose monitoring, initiate hypoglycemic protocol

## 2019-10-17 NOTE — ASSESSMENT & PLAN NOTE
· Reported dizziness of at least 3 days duration which progressed into vertigo like symptoms  Still persistent despite IV fluids, Zofran, meclizine, and Valium given in ED  · Did report some URI-like symptom onset, and did admit to recent poor p o  Intake  · Initial workup significant only for mild hypokalemia, CTA head/neck negative for any acute intracranial abnormalities or vascular abnormalities  · Trend 2 further troponin, check orthostatic vitals  · Supportive care with IVF overnight, p r n  Meclizine, p r n   Zofran  · PT consult

## 2019-10-17 NOTE — ASSESSMENT & PLAN NOTE
· Borderline low TSH levels  · Free T4 and free T3 not consistent with hyperthyroid state  · No significant symptoms of hyperthyroidism  · Recommend repeating TFTs in 4-6 weeks

## 2019-10-18 ENCOUNTER — TRANSITIONAL CARE MANAGEMENT (OUTPATIENT)
Dept: FAMILY MEDICINE CLINIC | Facility: CLINIC | Age: 69
End: 2019-10-18

## 2019-10-21 ENCOUNTER — TELEPHONE (OUTPATIENT)
Dept: FAMILY MEDICINE CLINIC | Facility: CLINIC | Age: 69
End: 2019-10-21

## 2019-10-21 DIAGNOSIS — R42 DIZZINESS: Primary | ICD-10-CM

## 2019-10-21 RX ORDER — MECLIZINE HCL 12.5 MG/1
TABLET ORAL
Qty: 30 TABLET | Refills: 1 | Status: SHIPPED | OUTPATIENT
Start: 2019-10-21 | End: 2020-08-25 | Stop reason: SDUPTHER

## 2019-10-21 NOTE — TELEPHONE ENCOUNTER
Patient released from hospital last week, had abnormal potassium level and put on meds  Continue ? And when to recheck  Alsoo question on meclizine, told to check with PCP about this med  States still feels somewhat "woozy", dizzy      Has TCM appt 10/31/19    Please advise

## 2019-10-21 NOTE — TELEPHONE ENCOUNTER
I called patient  He c/o feeling lightheaded, sometimes off balance  His blood pressure is stable  Recommended to stay well hydrated, avoid rapid positional changes  Prescription sent to the pharmacy for Meclizine 12 5 mg to take 1 tablet every 6- 8 hours as needed for dizziness  He denies sinus congestion, respiratory symptoms  Patient has appointment scheduled for TCM on 10/31/19

## 2019-10-21 NOTE — TELEPHONE ENCOUNTER
D/c from \[facility]\"" 10/18/19  TCM made for 10/31/2019    Dr Ariana Hill

## 2019-10-21 NOTE — TELEPHONE ENCOUNTER
I called [facility] and touched base with him  The hospital actually never gave him any prescriptions when he left  He was told to ask you about ordering that prescription, and he would like it sent to [facility] in [facility]  He did not recall anyone mentioning Prednisone to him, and he is willing to try it if you feel it is necessary  Please advise?

## 2019-10-21 NOTE — TELEPHONE ENCOUNTER
Please call patient  I reviewed discharge med list and do not see Potassium tables listed  Patient can take Meclizine as prescribed PRN for dizziness  Recommend to stay well hydrated, avoid rapid positional changes  It was mentioned on discharge that patient may need course of Prednisone to improved dizziness due to possible postviral neuritis, but he declined

## 2019-10-28 ENCOUNTER — APPOINTMENT (OUTPATIENT)
Dept: LAB | Facility: CLINIC | Age: 69
End: 2019-10-28
Payer: MEDICARE

## 2019-10-28 DIAGNOSIS — E78.2 MIXED HYPERLIPIDEMIA: ICD-10-CM

## 2019-10-28 DIAGNOSIS — E11.65 TYPE 2 DIABETES MELLITUS WITH HYPERGLYCEMIA, WITHOUT LONG-TERM CURRENT USE OF INSULIN (HCC): ICD-10-CM

## 2019-10-28 DIAGNOSIS — I10 BENIGN ESSENTIAL HYPERTENSION: ICD-10-CM

## 2019-10-28 LAB
ALBUMIN SERPL BCP-MCNC: 3.9 G/DL (ref 3.5–5)
ALP SERPL-CCNC: 78 U/L (ref 46–116)
ALT SERPL W P-5'-P-CCNC: 39 U/L (ref 12–78)
ANION GAP SERPL CALCULATED.3IONS-SCNC: 8 MMOL/L (ref 4–13)
AST SERPL W P-5'-P-CCNC: 23 U/L (ref 5–45)
BASOPHILS # BLD AUTO: 0.06 THOUSANDS/ΜL (ref 0–0.1)
BASOPHILS NFR BLD AUTO: 1 % (ref 0–1)
BILIRUB SERPL-MCNC: 1.06 MG/DL (ref 0.2–1)
BUN SERPL-MCNC: 15 MG/DL (ref 5–25)
CALCIUM SERPL-MCNC: 9.3 MG/DL (ref 8.3–10.1)
CHLORIDE SERPL-SCNC: 104 MMOL/L (ref 100–108)
CHOLEST SERPL-MCNC: 164 MG/DL (ref 50–200)
CO2 SERPL-SCNC: 27 MMOL/L (ref 21–32)
CREAT SERPL-MCNC: 0.97 MG/DL (ref 0.6–1.3)
EOSINOPHIL # BLD AUTO: 0.24 THOUSAND/ΜL (ref 0–0.61)
EOSINOPHIL NFR BLD AUTO: 3 % (ref 0–6)
ERYTHROCYTE [DISTWIDTH] IN BLOOD BY AUTOMATED COUNT: 13.4 % (ref 11.6–15.1)
EST. AVERAGE GLUCOSE BLD GHB EST-MCNC: 151 MG/DL
GFR SERPL CREATININE-BSD FRML MDRD: 78 ML/MIN/1.73SQ M
GLUCOSE P FAST SERPL-MCNC: 135 MG/DL (ref 65–99)
HBA1C MFR BLD: 6.9 % (ref 4.2–6.3)
HCT VFR BLD AUTO: 46.1 % (ref 36.5–49.3)
HDLC SERPL-MCNC: 55 MG/DL
HGB BLD-MCNC: 14.8 G/DL (ref 12–17)
IMM GRANULOCYTES # BLD AUTO: 0.05 THOUSAND/UL (ref 0–0.2)
IMM GRANULOCYTES NFR BLD AUTO: 1 % (ref 0–2)
LDLC SERPL CALC-MCNC: 64 MG/DL (ref 0–100)
LYMPHOCYTES # BLD AUTO: 1.41 THOUSANDS/ΜL (ref 0.6–4.47)
LYMPHOCYTES NFR BLD AUTO: 17 % (ref 14–44)
MCH RBC QN AUTO: 29.2 PG (ref 26.8–34.3)
MCHC RBC AUTO-ENTMCNC: 32.1 G/DL (ref 31.4–37.4)
MCV RBC AUTO: 91 FL (ref 82–98)
MONOCYTES # BLD AUTO: 0.7 THOUSAND/ΜL (ref 0.17–1.22)
MONOCYTES NFR BLD AUTO: 8 % (ref 4–12)
NEUTROPHILS # BLD AUTO: 6.06 THOUSANDS/ΜL (ref 1.85–7.62)
NEUTS SEG NFR BLD AUTO: 70 % (ref 43–75)
NONHDLC SERPL-MCNC: 109 MG/DL
NRBC BLD AUTO-RTO: 0 /100 WBCS
PLATELET # BLD AUTO: 264 THOUSANDS/UL (ref 149–390)
PMV BLD AUTO: 10.2 FL (ref 8.9–12.7)
POTASSIUM SERPL-SCNC: 4.1 MMOL/L (ref 3.5–5.3)
PROT SERPL-MCNC: 7.6 G/DL (ref 6.4–8.2)
RBC # BLD AUTO: 5.06 MILLION/UL (ref 3.88–5.62)
SODIUM SERPL-SCNC: 139 MMOL/L (ref 136–145)
TRIGL SERPL-MCNC: 225 MG/DL
WBC # BLD AUTO: 8.52 THOUSAND/UL (ref 4.31–10.16)

## 2019-10-28 PROCEDURE — 83036 HEMOGLOBIN GLYCOSYLATED A1C: CPT

## 2019-10-28 PROCEDURE — 80053 COMPREHEN METABOLIC PANEL: CPT

## 2019-10-28 PROCEDURE — 36415 COLL VENOUS BLD VENIPUNCTURE: CPT

## 2019-10-28 PROCEDURE — 85025 COMPLETE CBC W/AUTO DIFF WBC: CPT

## 2019-10-28 PROCEDURE — 80061 LIPID PANEL: CPT

## 2019-10-31 ENCOUNTER — OFFICE VISIT (OUTPATIENT)
Dept: FAMILY MEDICINE CLINIC | Facility: CLINIC | Age: 69
End: 2019-10-31
Payer: MEDICARE

## 2019-10-31 VITALS
BODY MASS INDEX: 36.22 KG/M2 | SYSTOLIC BLOOD PRESSURE: 138 MMHG | OXYGEN SATURATION: 95 % | WEIGHT: 253 LBS | HEART RATE: 90 BPM | HEIGHT: 70 IN | TEMPERATURE: 98.9 F | DIASTOLIC BLOOD PRESSURE: 84 MMHG | RESPIRATION RATE: 16 BRPM

## 2019-10-31 DIAGNOSIS — I10 BENIGN ESSENTIAL HYPERTENSION: ICD-10-CM

## 2019-10-31 DIAGNOSIS — R79.89 LOW TSH LEVEL: ICD-10-CM

## 2019-10-31 DIAGNOSIS — E78.2 MIXED HYPERLIPIDEMIA: ICD-10-CM

## 2019-10-31 DIAGNOSIS — Z23 NEED FOR PNEUMOCOCCAL VACCINATION: ICD-10-CM

## 2019-10-31 DIAGNOSIS — R42 DIZZINESS: Primary | ICD-10-CM

## 2019-10-31 DIAGNOSIS — E11.65 TYPE 2 DIABETES MELLITUS WITH HYPERGLYCEMIA, WITHOUT LONG-TERM CURRENT USE OF INSULIN (HCC): ICD-10-CM

## 2019-10-31 DIAGNOSIS — E66.01 MORBID OBESITY DUE TO EXCESS CALORIES (HCC): ICD-10-CM

## 2019-10-31 PROCEDURE — 1111F DSCHRG MED/CURRENT MED MERGE: CPT | Performed by: FAMILY MEDICINE

## 2019-10-31 PROCEDURE — G0009 ADMIN PNEUMOCOCCAL VACCINE: HCPCS

## 2019-10-31 PROCEDURE — 99495 TRANSJ CARE MGMT MOD F2F 14D: CPT | Performed by: FAMILY MEDICINE

## 2019-10-31 PROCEDURE — 90732 PPSV23 VACC 2 YRS+ SUBQ/IM: CPT

## 2019-10-31 NOTE — ASSESSMENT & PLAN NOTE
Lab Results   Component Value Date    HGBA1C 6 9 (H) 10/28/2019      Hemoglobin A1c improved since May 2019  Recommended to continue Metformin 1000 mg 1 tablet twice daily  Monitor blood sugar at home  Check eye exam by ophthalmologist Dr Josy Veloz annually  Recommended podiatry evaluation

## 2019-10-31 NOTE — ASSESSMENT & PLAN NOTE
LDL at goal at 64  Triglycerides 225, goal < 150  Continue Atorvastatin 20 mg daily  Discussed dietary modifications with patient  Recommended to a low-cholesterol, low-fat diet, lose weight

## 2019-10-31 NOTE — PROGRESS NOTES
Chief Complaint   Patient presents with    Transition of Care Management     10/16-10/17/19 Dizziness/Vertigo     Health Maintenance   Topic Date Due    CRC Screening: Colonoscopy  1950    HEPATITIS B VACCINES (1 of 3 - Risk 3-dose series) 01/10/1967    DTaP,Tdap,and Td Vaccines (1 - Tdap) 01/10/1969    Diabetic Foot Exam  07/17/2018    Medicare Annual Wellness Visit (AWV)  07/17/2019    BMI: Followup Plan  01/15/2020    HEMOGLOBIN A1C  04/28/2020    URINE MICROALBUMIN  05/07/2020    Depression Screening PHQ  10/16/2020    Fall Risk  10/31/2020    BMI: Adult  10/31/2020    DM Eye Exam  03/18/2021    Hepatitis C Screening  Completed    INFLUENZA VACCINE  Completed    Pneumococcal Vaccine: 65+ Years  Completed    Pneumococcal Vaccine: Pediatrics (0 to 5 Years) and At-Risk Patients (6 to 59 Years)  Aged Out       Assessment/Plan:     Patient presents for TCM visit  He was hospitalized at [address] [place] [address] 10/16/19- 10/17/19 for dizziness/ vertigo  Dizziness/Vertigo  Patient reports improvement in symptoms since hospital discharge  CTA head and neck done in the hospital showed no significant vascular occlusion, stenosis or dissection, also was negative for any infarction, intracranial bleed or mass effect  Recommended patient to schedule physical therapy  Patient was advised to stay well hydrated, avoid rapid positional changes  Diabetic neuropathy associated with type 2 diabetes mellitus ([facility] )    Lab Results   Component Value Date    HGBA1C 6 9 (H) 10/28/2019      Hemoglobin A1c improved since May 2019  Recommended to continue Metformin 1000 mg 1 tablet twice daily  Monitor blood sugar at home  Check eye exam by ophthalmologist Dr Viky Luna annually  Recommended podiatry evaluation  Benign essential hypertension  Blood pressure remains stable  Continue Amlodipine, Lisinopril/ HCTZ, Metoprolol  Follow a low-sodium diet, regular exercise      Low TSH level  TSH level was borderline low during hospitalization  Patient is asymptomatic  Will recheck TSH in 4 weeks  Mixed hyperlipidemia  LDL at goal at 64  Triglycerides 225, goal < 150  Continue Atorvastatin 20 mg daily  Discussed dietary modifications with patient  Recommended to a low-cholesterol, low-fat diet, lose weight  Morbid obesity due to excess calories ([facility] [address] )  Encouraged weight reduction  HM: Pneumovax administered today  Schedule Medicare AWV next month  RTO for regular follow-up office visit in 6 months  Check labs prior to visit in 6 months  Diagnoses and all orders for this visit:    Dizziness/Vertigo  -     Ambulatory referral to Physical Therapy; Future    Type 2 diabetes mellitus with hyperglycemia, without long-term current use of insulin (HCC)  -     Comprehensive metabolic panel; Future  -     Hemoglobin A1C; Future  -     Microalbumin / creatinine urine ratio; Future    Benign essential hypertension  -     Comprehensive metabolic panel; Future    Low TSH level  -     TSH, 3rd generation with Free T4 reflex; Future    Mixed hyperlipidemia  -     Comprehensive metabolic panel; Future  -     Lipid panel; Future    Morbid obesity due to excess calories ([facility] )    Need for pneumococcal vaccination  -     PNEUMOCOCCAL POLYSACCHARIDE VACCINE 23-VALENT =>3YO SQ IM     Subjective:     Patient ID: Roly Miller is a 68 y o  male  HPI     Patient presents for TCM visit  He was hospitalized at [address] [place] [address] 10/16/19- 10/17/19 for dizziness/ vertigo  Reviewed hospital records, test results, discharge medications with patient  Patient originally presented to the hospital due to dizziness and vertigo after recent viral upper respiratory infection  CTA head and neck showed no significant vascular occlusion, stenosis or dissection and also was negative for any infarction, intracranial bleed or mass effect        TSH level was borderline low with normal T4 free and borderline low T3  He was recommended to repeat thyroid function test in 4 -6 weeks as outpatient  Patient was recommended to schedule outpatient physical therapy  Patient reports improvement in dizziness  He denies feeling off balance now  No chest pain, shortness of breath, heart palpitations  Patient took Meclizine 12 5 mg only 2 times after discharge from the hospital       PMHx: HTN, Type 2 DM, Hyperlipidemia, Morbid obesity, fatty liver  Reviewed blood test results from 10/28/19  Cholesterol 164, triglycerides 225, HDL 55, LDL 64  Hb14 8, potassium 4 1, creatinine 0 97, glucose 135  Hb A1c 6 9 ( improved from 7 6 in May 2019)  HTN - bressure remains stable  Patient takes Lisinopril HCTZ/ 20/25 mg 1 tablet daily, Amlodipine 5 mg daily, Metoprolol 25 mg -1/2  tablet twice daily  BP at home ranges in 140's/ 80's  Nuclear stress test done in April 2018 was negative for cardiac ischemia  Type 2 DM -  Hemoglobin A1c improved since May 2019  Patient takes metformin 1000 mg twice daily  Tries to watch his diet  Eye exams by ophthalmologist Dr Law Mandel  Patient has not been followed by podiatrist  C/o occasional tingling, numbness in feet  Hyperlipidemia - patient takes Atorvastatin 20 mg daily  Denies side effects from statin therapy  Prevnar 13 done in 12/15  Patient had flu shot on 10/17/19  Review of Systems   Constitutional: Negative for activity change, appetite change, chills, fatigue and fever  HENT: Negative for congestion, ear pain, hearing loss, mouth sores, nosebleeds, postnasal drip, sore throat, tinnitus and trouble swallowing  Eyes: Negative for pain, discharge, redness, itching and visual disturbance  Wears glasses   Respiratory: Positive for shortness of breath (mild stable exertional SOB)  Negative for cough, chest tightness and wheezing      Cardiovascular: Negative for chest pain, palpitations and leg swelling  Gastrointestinal: Negative for abdominal pain, blood in stool, constipation, diarrhea, nausea and vomiting  Genitourinary: Negative for difficulty urinating, dysuria, flank pain and hematuria  Nocturia x 1   Musculoskeletal: Negative for arthralgias, back pain, gait problem, joint swelling, myalgias and neck pain  Skin: Negative for rash and wound  Neurological: Positive for dizziness (improved) and numbness (mild mumbness in feet)  Negative for syncope and headaches  Hematological: Negative  Psychiatric/Behavioral: Negative  Objective:     Physical Exam   Constitutional: He is oriented to person, place, and time  He appears well-developed and well-nourished  Obese   HENT:   Head: Normocephalic and atraumatic  Right Ear: External ear normal    Left Ear: External ear normal    Mouth/Throat: Oropharynx is clear and moist    Eyes: Pupils are equal, round, and reactive to light  Conjunctivae are normal    Neck: Normal range of motion  Neck supple  No JVD present  Cardiovascular: Normal rate, regular rhythm and normal heart sounds  Pulses are weak pulses  No murmur heard  Pulses:       Dorsalis pedis pulses are 1+ on the right side, and 1+ on the left side  No carotid bruits BL  No BL LE edema   Pulmonary/Chest: Effort normal and breath sounds normal    Abdominal: Soft  Bowel sounds are normal  There is no tenderness  Musculoskeletal: Normal range of motion  He exhibits no edema, tenderness or deformity  Feet:   Right Foot:   Skin Integrity: Negative for ulcer, skin breakdown, erythema, warmth, callus or dry skin  Left Foot:   Skin Integrity: Negative for ulcer, skin breakdown, erythema, warmth, callus or dry skin  Neurological: He is alert and oriented to person, place, and time  No cranial nerve deficit  Coordination normal    Skin: Skin is warm and dry  No rash noted  Psychiatric: He has a normal mood and affect  Nursing note and vitals reviewed  Patient's shoes and socks removed  Right Foot/Ankle   Right Foot Inspection  Skin Exam: skin normal and skin intact no dry skin, no warmth, no callus, no erythema, no maceration, no abnormal color, no pre-ulcer, no ulcer and no callus                          Toe Exam: no swelling, no tenderness, erythema and  no right toe deformity  Sensory       Monofilament testing: intact  Vascular    The right DP pulse is 1+  Left Foot/Ankle  Left Foot Inspection  Skin Exam: skin normal and skin intactno dry skin, no warmth, no erythema, no maceration, normal color, no pre-ulcer, no ulcer and no callus                         Toe Exam: no swelling, no tenderness, no erythema and no left toe deformity                   Sensory       Monofilament: intact  Vascular    The left DP pulse is 1+  Assign Risk Category:  No deformity present; No loss of protective sensation; Weak pulses       Risk: 0    Vitals:    10/31/19 1447   BP: 138/84   BP Location: Left arm   Patient Position: Sitting   Cuff Size: Adult   Pulse: 90   Resp: 16   Temp: 98 9 °F (37 2 °C)   TempSrc: Tympanic   SpO2: 95%   Weight: 115 kg (253 lb)   Height: 5' 10" (1 778 m)       Transitional Care Management Review:  Jennifer Cousin is a 68 y o  male here for TCM follow up  During the TCM phone call patient stated:    TCM Call (since 9/30/2019)     Date and time call was made  10/18/2019  3:11 PM    Hospital care reviewed  Records reviewed    Patient was hospitialized at  [facility]    Date of Admission  10/16/19    Date of discharge  10/17/19    Diagnosis  Dizziness/Vertigo    Disposition  Home    Were the patients medications reviewed and updated  Yes    Current Symptoms  None      TCM Call (since 9/30/2019)     Post hospital issues  None    Should patient be enrolled in anticoag monitoring? No    Scheduled for follow up?   Yes <img src='C:FILES ([email or web address])    Did you obtain your prescribed medications  Yes    Do you need help managing your prescriptions or medications  No    Is transportation to your appointment needed  No    I have advised the patient to call PCP with any new or worsening symptoms  [address]; Spouse or Significiant other    Support System  Partner    The type of support provided  Physical; Emotional; Financial    Do you have social support  Yes, as much as I need    Are you recieving any outpatient services  No    Are you recieving home care services  No    Are you using any community resources  No    Current waiver services  No    Have you fallen in the last 12 months  No    Interperter language line needed  No    Counseling  Patient          Eric Franklin MD

## 2019-10-31 NOTE — ASSESSMENT & PLAN NOTE
Patient reports improvement in symptoms since hospital discharge  CTA head and neck done in the hospital showed no significant vascular occlusion, stenosis or dissection, also was negative for any infarction, intracranial bleed or mass effect  Recommended patient to schedule physical therapy  Patient was advised to stay well hydrated, avoid rapid positional changes

## 2019-10-31 NOTE — ASSESSMENT & PLAN NOTE
TSH level was borderline low during hospitalization  Patient is asymptomatic  Will recheck TSH in 4 weeks

## 2019-10-31 NOTE — ASSESSMENT & PLAN NOTE
Blood pressure remains stable  Continue Amlodipine, Lisinopril/ HCTZ, Metoprolol  Follow a low-sodium diet, regular exercise

## 2019-11-12 ENCOUNTER — LAB (OUTPATIENT)
Dept: LAB | Facility: CLINIC | Age: 69
End: 2019-11-12
Payer: MEDICARE

## 2019-11-12 ENCOUNTER — EVALUATION (OUTPATIENT)
Dept: PHYSICAL THERAPY | Facility: CLINIC | Age: 69
End: 2019-11-12
Payer: MEDICARE

## 2019-11-12 DIAGNOSIS — H81.11 BPPV (BENIGN PAROXYSMAL POSITIONAL VERTIGO), RIGHT: ICD-10-CM

## 2019-11-12 DIAGNOSIS — R42 DIZZINESS: ICD-10-CM

## 2019-11-12 DIAGNOSIS — H81.12 BPPV (BENIGN PAROXYSMAL POSITIONAL VERTIGO), LEFT: Primary | ICD-10-CM

## 2019-11-12 DIAGNOSIS — R79.89 LOW TSH LEVEL: ICD-10-CM

## 2019-11-12 LAB — TSH SERPL DL<=0.05 MIU/L-ACNC: 1.32 UIU/ML (ref 0.36–3.74)

## 2019-11-12 PROCEDURE — 84443 ASSAY THYROID STIM HORMONE: CPT

## 2019-11-12 PROCEDURE — 36415 COLL VENOUS BLD VENIPUNCTURE: CPT

## 2019-11-12 PROCEDURE — 95992 CANALITH REPOSITIONING PROC: CPT | Performed by: PHYSICAL THERAPIST

## 2019-11-12 PROCEDURE — 97162 PT EVAL MOD COMPLEX 30 MIN: CPT | Performed by: PHYSICAL THERAPIST

## 2019-11-12 NOTE — PROGRESS NOTES
PT Evaluation     Today's date: 2019  Patient name: Claudean Badger  : 1950  MRN: [id number]  Referring provider: Elvi Crockett MD  Dx:   Encounter Diagnosis     ICD-10-CM    1  BPPV (benign paroxysmal positional vertigo), left H81 12    2  Dizziness/Vertigo R42 Ambulatory referral to Physical Therapy   3  BPPV (benign paroxysmal positional vertigo), right H81 11                   Assessment  Assessment details: Pt is a 69 yo male with diagnosis of vertigo and dizziness presenting with signs and symptoms of bilateral posterior canal BPPV, left moreso than right subjectively  He has severe vertigo and nystagmus lasting appx 10-20 seconds with bilateral [place]  At the time of evaluation, he was educated on treatment and elected to postpone it until he could take meclizine prior to appointment out of fear of not being able to tolerate maneuver and drive himself home  Scheduled follow up in 48 hours and patient provided with more information regarding diagnosis and treatment  Functional limitations: slow with functional transfers  Goals  STG - 2 sessions  1  Test negative for left [place] [facility] to indicate cleared L BPPV  2  Comply with after session instructions for BPPV  LTG - 3-4 sessions  1  Tolerate all functional transfers without onset of dizziness to allow resuming PLOF  2  Test negative for bilateral [place] [facility] to indicate cleared B BPPV  Plan  Plan details: Reviewed physical exam findings and plan of care  All questions answered to patient's satisfaction  Patient would benefit from: skilled physical therapy  Planned therapy interventions: canalith repositioning  Duration in visits: 4  Treatment plan discussed with: patient        Subjective Evaluation    History of Present Illness  Date of onset: 10/16/2019  Mechanism of injury: Pt had sudden onset vertigo appx one month ago and went to the ER   He followed up with his family doctor and was referred to outpatient PT  The dizziness is brief and seems to be related to a change in position  He also reports decreased balance over the last few months but that is not his priority at this time      PMH significant for exertional shortness of breath, DM II, HTN, cataracts  Pain  No pain reported      Diagnostic Tests  X-ray: normal  CT scan: normal  Carotid study: normal  Treatments  Previous treatment: medication  Patient Goals  Patient goal: eliminate dizziness        Objective    Flowsheet Rows      Most Recent Value   PT/OT G-Codes   Current Score  12   Projected Score  57            Dysequilibrium: Yes  Lightheadedness: No  Vertigo: Yes  Rocking or Swaying: No         Oscillopsia: No  Diplopia: No  Motion sickness: No  Floating, Swimming, Disconnected: No    Exacerbation Factors:  Bending over: Yes  Turning Head: No  Rolling in bed: Yes  Walking: No  Looking up: Yes  Supine to/from sitting: Yes  Optokinetic movement: No  Walking in busy environment: Yes    Duration of Symptoms: 1-2 minutes     Concurrent Complaints:  Tinnitus:No  Aural Fullness:No  Known hearing loss:No  Nausea, Vomiting: Yes  Altered Vision: No  Poor Concentration: No  Memory Loss: No  Peripheral Neuropathy:No  Cervical Pain: No   Headache: No      PHYSICAL FINDINGS:  Oculomotor ROM : WFL   Resting nystagmus: No  Gaze holding nystagmus No   Smooth pursuit Normal    Vertical Saccades:Normal  Horizontal Saccades:Normal  Convergence: Abnormal 10 5 cm left eye laterally deviates    Cover/Uncover/Crosscover Test: Normal    Head thrust (room light): Normal      Positional testing: Right Left   Owosso Reese pike Marked positive vertigo & nystagmus   Marked positive vertigo & nystagmus   Roll test:  NT  NT       Cervical ROM: no vertigo/dizziness with AROM C/S all planes  Retraction: WFL  Flexion: WFL  Extension: WFL  Right rotation: WFL  Left rotation: WFL  Right lateral flexion: WFL  Left lateral flexion: WFL             Precautions: severe BPPV     Daily Treatment Diary Manuals 11/12            L Epley + education education only            R Epley + education                                       Exercise Diary                                                                                                                                                                                                                                                                                                            Modalities

## 2019-11-14 ENCOUNTER — OFFICE VISIT (OUTPATIENT)
Dept: PHYSICAL THERAPY | Facility: CLINIC | Age: 69
End: 2019-11-14
Payer: MEDICARE

## 2019-11-14 DIAGNOSIS — H81.12 BPPV (BENIGN PAROXYSMAL POSITIONAL VERTIGO), LEFT: ICD-10-CM

## 2019-11-14 DIAGNOSIS — H81.11 BPPV (BENIGN PAROXYSMAL POSITIONAL VERTIGO), RIGHT: ICD-10-CM

## 2019-11-14 DIAGNOSIS — R42 DIZZINESS: Primary | ICD-10-CM

## 2019-11-14 PROCEDURE — 95992 CANALITH REPOSITIONING PROC: CPT | Performed by: PHYSICAL THERAPIST

## 2019-11-14 NOTE — PROGRESS NOTES
Daily Note     Today's date: 2019  Patient name: Ragini Dowling  : 1950  MRN: [id number]  Referring provider: Juany Lewis MD  Dx:   Encounter Diagnosis     ICD-10-CM    1  Dizziness/Vertigo R42    2  BPPV (benign paroxysmal positional vertigo), left H81 12    3  BPPV (benign paroxysmal positional vertigo), right H81 11                   Subjective: Pt reports he felt woozy for a couple hours after last visit  He took meclizine an hour prior to appt today as instructed  Objective: See treatment diary below      Assessment: Tolerated treatment fair  Patient able to tolerate L Epley x2 trials today  Nystagmus present and "lightheadedness" reported  Negative VBI bilaterally  Plan: Reassess bilaterally next visit and treat R side if necessary/able             Precautions: severe BPPV     Daily Treatment Diary       Manuals            L Epley + education education only x2           R Epley + education                                       Exercise Diary                                                                                                                                                                                                                                                                                                            Modalities

## 2019-11-15 ENCOUNTER — OFFICE VISIT (OUTPATIENT)
Dept: FAMILY MEDICINE CLINIC | Facility: CLINIC | Age: 69
End: 2019-11-15
Payer: MEDICARE

## 2019-11-15 VITALS
DIASTOLIC BLOOD PRESSURE: 86 MMHG | HEART RATE: 108 BPM | BODY MASS INDEX: 36.33 KG/M2 | TEMPERATURE: 99.5 F | WEIGHT: 253.8 LBS | HEIGHT: 70 IN | RESPIRATION RATE: 20 BRPM | SYSTOLIC BLOOD PRESSURE: 162 MMHG | OXYGEN SATURATION: 93 %

## 2019-11-15 DIAGNOSIS — Z00.00 MEDICARE ANNUAL WELLNESS VISIT, SUBSEQUENT: Primary | ICD-10-CM

## 2019-11-15 DIAGNOSIS — Z12.11 SCREENING FOR COLON CANCER: ICD-10-CM

## 2019-11-15 PROCEDURE — G0439 PPPS, SUBSEQ VISIT: HCPCS | Performed by: FAMILY MEDICINE

## 2019-11-15 NOTE — PATIENT INSTRUCTIONS

## 2019-11-15 NOTE — PROGRESS NOTES
Assessment and Plan:     Problem List Items Addressed This Visit     None           Preventive health issues were discussed with patient, and age appropriate screening tests were ordered as noted in patient's After Visit Summary  Personalized health advice and appropriate referrals for health education or preventive services given if needed, as noted in patient's After Visit Summary       History of Present Illness:     Patient presents for Medicare Annual Wellness visit    Patient Care Team:  Pasha Painter MD as PCP - General     Problem List:     Patient Active Problem List   Diagnosis    Abnormal EKG    Benign essential hypertension    PVC (premature ventricular contraction)    Elevated liver enzymes    Exertional shortness of breath    Fatty liver    Glaucoma    Mixed hyperlipidemia    [facility]bid obesity due to excess calories [facility]    Sinus tachycardia    Type 2 diabetes mellitus with hyperglycemia ([facility]    Vitamin D deficiency    Hypoxia    Medicare annual wellness visit, subsequent    Hypersensitivity pneumonitis ([facility]    Diabetic neuropathy associated with type 2 diabetes mellitus ([facility] [facility]    Dizziness/Vertigo    Low TSH level      Past Medical and Surgical History:     Past Medical History:   Diagnosis Date    Cataract, left     last assessed [id number]    Epistaxis     last assessed [id number]    Exertional shortness of breath     last assessed [id number]    IFG (impaired fasting glucose)     last assessed [id number]     Past Surgical History:   Procedure Laterality Date    CATARACT EXTRACTION Left 07/01/2016    L eye cataract surgery - 7/16      Family History:     Family History   Problem Relation Age of Onset    Stomach cancer Father     No Known Problems Mother       Social History:     Social History     Socioeconomic History    Marital status: /Civil Union     Spouse name: Not on file    Number of children: Not on file    Years of education: Not on file   Kavitha Dominguez education level: Not on file   Occupational History    Not on file   Social Needs    Financial resource strain: Not on file    Food insecurity:     Worry: Not on file     Inability: Not on file    Transportation needs:     Medical: Not on file     Non-medical: Not on file   Tobacco Use    Smoking status: Never Smoker    Smokeless tobacco: Never Used   Substance and Sexual Activity    Alcohol use: No     Frequency: Never     Drinks per session: 1 or 2     Binge frequency: Never    Drug use: Never    Sexual activity: Not Currently   Lifestyle    Physical activity:     Days per week: Not on file     Minutes per session: Not on file    Stress: Not on file   Relationships    Social connections:     Talks on phone: Not on file     Gets together: Not on file     Attends [employer] service: Not on file     Active member of club or organization: Not on file     Attends meetings of clubs or organizations: Not on file     Relationship status: Not on file    Intimate partner violence:     Fear of current or ex partner: Not on file     Emotionally abused: Not on file     Physically abused: Not on file     Forced sexual activity: Not on file   Other Topics Concern    Not on file   Social History Narrative    Not on file       Medications and Allergies:     Current Outpatient Medications   Medication Sig Dispense Refill    amLODIPine (NORVASC) 5 mg tablet Take 1 tablet (5 mg total) by mouth daily 90 tablet 3    aspirin 325 mg tablet Take 1 tablet by mouth every 4 (four) hours as needed      atorvastatin (LIPITOR) 20 mg tablet Take 1 tablet (20 mg total) by mouth daily for 90 days 90 tablet 3    cholecalciferol (VITAMIN D3) 1,000 units tablet Take 1 tablet by mouth daily      glucose blood (ONE TOUCH ULTRA TEST) test strip Test sugar 2-3 times daily 300 each 3    latanoprost (XALATAN) 0 005 % ophthalmic solution Apply to eye      ChatousCAN UNISTIK II LANCETS MISC by Does not apply route 3 (three) times a day for 90 days 300 each 3    lisinopril-hydrochlorothiazide (PRINZIDE,ZESTORETIC) 20-25 MG per tablet TAKE ONE TABLET BY MOUTH ONCE DAILY 90 tablet 3    meclizine (ANTIVERT) 12 5 MG tablet Take 1 tab  every 6-8 hr  PRN for dizziness (Patient not taking: Reported on 10/31/2019) 30 tablet 1    metFORMIN (GLUCOPHAGE) 1000 MG tablet Take 1 tablet (1,000 mg total) by mouth 2 (two) times a day for 336 days 180 tablet 3    metoprolol tartrate (LOPRESSOR) 25 mg tablet Take 0 5 tablets (12 5 mg total) by mouth 2 (two) times a day for 90 days 90 tablet 3    Multiple Vitamins-Minerals (MULTIVITAL-M) TABS Take 1 tablet by mouth daily      Omega-3 Fatty Acids (FISH OIL) 1,000 mg Take by mouth 2 (two) times a day      vitamin E, tocopherol, 400 units capsule Take by mouth daily       No current facility-administered medications for this visit  No Known Allergies   Immunizations:     Immunization History   Administered Date(s) Administered    Influenza Split High Dose Preservative Free IM 12/04/2015, 01/16/2017, 01/16/2018    Influenza, high dose seasonal 0 5 mL 10/23/2018, 10/17/2019    Pneumococcal Conjugate 13-Valent 12/04/2015    Pneumococcal Polysaccharide PPV23 10/31/2019      Health Maintenance:         Topic Date Due    CRC Screening: Colonoscopy  1950    Hepatitis C Screening  Completed         Topic Date Due    DTaP,Tdap,and Td Vaccines (1 - Tdap) 01/10/1959    Hepatitis B Vaccine (1 of 3 - Risk 3-dose series) 01/10/1967      Medicare Health Risk Assessment:     There were no vitals taken for this visit  Sana Montelongo is here for his Subsequent Wellness visit  Health Risk Assessment:   Patient rates overall health as good  Patient feels that their physical health rating is same  Eyesight was rated as slightly worse  Hearing was rated as same  Patient feels that their emotional and mental health rating is same  Pain experienced in the last 7 days has been some  Patient's pain rating has been 1/10  Patient states that he has experienced no weight loss or gain in last 6 months  Depression Screening:   PHQ-2 Score: 2      Fall Risk Screening: In the past year, patient has experienced: no history of falling in past year      Home Safety:  Patient does not have trouble with stairs inside or outside of their home  Patient has working smoke alarms and has no working carbon monoxide detector  Home safety hazards include: poor household lighting  Nutrition:   Current diet is Diabetic and Limited junk food  Medications:   Patient is currently taking over-the-counter supplements  OTC medications include: see medication list  Patient is able to manage medications  Activities of Daily Living (ADLs)/Instrumental Activities of Daily Living (IADLs):   Walk and transfer into and out of bed and chair?: Yes  Dress and groom yourself?: Yes    Bathe or shower yourself?: Yes    Feed yourself?  Yes  Do your laundry/housekeeping?: Yes  Manage your money, pay your bills and track your expenses?: Yes  Make your own meals?: Yes    Do your own shopping?: Yes    Previous Hospitalizations:   Any hospitalizations or ED visits within the last 12 months?: Yes    How many hospitalizations have you had in the last year?: 1-2    Hospitalization Comments: Vertigo    Advance Care Planning:   Living will: No    Durable POA for healthcare: No    Advanced directive: No    Advanced directive counseling given: Yes    Five wishes given: Yes      Cognitive Screening:   Provider or family/friend/caregiver concerned regarding cognition?: No    PREVENTIVE SCREENINGS      Cardiovascular Screening:    General: Screening Not Indicated and History Lipid Disorder      Diabetes Screening:     General: Screening Not Indicated and History Diabetes      Colorectal Cancer Screening:     General: Patient Declines    Due for: FOBT/FIT      Prostate Cancer Screening:    General: Risks and Benefits Discussed      Osteoporosis Screening:    General: Risks and Benefits Discussed      Abdominal Aortic Aneurysm (AAA) Screening:    Risk factors include: age between 73-67 yo        General: Risks and Benefits Discussed and Screening Not Indicated      Lung Cancer Screening:     General: Risks and Benefits Discussed and Screening Not Indicated      Hepatitis C Screening:    General: Screening Current    Other Counseling Topics:   Car/seat belt/driving safety, skin self-exam and calcium and vitamin D intake and regular weightbearing exercise  Mini-cog score 5      Ayo Palma MD

## 2019-11-18 ENCOUNTER — OFFICE VISIT (OUTPATIENT)
Dept: PHYSICAL THERAPY | Facility: CLINIC | Age: 69
End: 2019-11-18
Payer: MEDICARE

## 2019-11-18 DIAGNOSIS — H81.12 BPPV (BENIGN PAROXYSMAL POSITIONAL VERTIGO), LEFT: ICD-10-CM

## 2019-11-18 DIAGNOSIS — R42 DIZZINESS: Primary | ICD-10-CM

## 2019-11-18 DIAGNOSIS — H81.11 BPPV (BENIGN PAROXYSMAL POSITIONAL VERTIGO), RIGHT: ICD-10-CM

## 2019-11-18 PROCEDURE — 95992 CANALITH REPOSITIONING PROC: CPT | Performed by: PHYSICAL THERAPIST

## 2019-11-18 NOTE — PROGRESS NOTES
Daily Note     Today's date: 2019  Patient name: Tiny Gilmore  : 1950  MRN: [id number]  Referring provider: Cherri Guerrero MD  Dx:   Encounter Diagnosis     ICD-10-CM    1  Dizziness/Vertigo R42    2  BPPV (benign paroxysmal positional vertigo), left H81 12    3  BPPV (benign paroxysmal positional vertigo), right H81 11                   Subjective: Pt reports feeling well after last visit but has not been testing his vertigo (has not laid flat)  Objective: See treatment diary below      Assessment: Tolerated treatment poor  Patient tested negative for left [facility] but still positive for right  Only able to tolerate treatment 1x as he vomited during the second trial        Plan: Continue per plan of care              Precautions: severe BPPV     Daily Treatment Diary       Manuals           L Epley + education education only x2 N/A          R Epley + education   1x                                    Exercise Diary                                                                                                                                                                                                                                                                                                            Modalities

## 2019-11-26 ENCOUNTER — OFFICE VISIT (OUTPATIENT)
Dept: PHYSICAL THERAPY | Facility: CLINIC | Age: 69
End: 2019-11-26
Payer: MEDICARE

## 2019-11-26 DIAGNOSIS — H81.11 BPPV (BENIGN PAROXYSMAL POSITIONAL VERTIGO), RIGHT: ICD-10-CM

## 2019-11-26 DIAGNOSIS — H81.12 BPPV (BENIGN PAROXYSMAL POSITIONAL VERTIGO), LEFT: ICD-10-CM

## 2019-11-26 DIAGNOSIS — R42 DIZZINESS: Primary | ICD-10-CM

## 2019-11-26 PROCEDURE — 95992 CANALITH REPOSITIONING PROC: CPT | Performed by: PHYSICAL THERAPIST

## 2019-11-26 NOTE — PROGRESS NOTES
Daily Note - Discharge Summary    Today's date: 2019  Patient name: Eliud Rosenbaum  : 1950  MRN: [id number]  Referring provider: Ti Brown MD  Dx:   Encounter Diagnosis     ICD-10-CM    1  Dizziness/Vertigo R42    2  BPPV (benign paroxysmal positional vertigo), left H81 12    3  BPPV (benign paroxysmal positional vertigo), right H81 11                   Subjective: Pt reports he's feeling great since last visit and has been laying flat without dizziness  Objective: See treatment diary below    Goals  STG - 2 sessions  1  Test negative for left [place] to indicate cleared L BPPV  - MET  2  Comply with after session instructions for BPPV  - MET    LTG - 3-4 sessions  1  Tolerate all functional transfers without onset of dizziness to allow resuming PLOF  - MET  2  Test negative for bilateral [place] to indicate cleared B BPPV  - MET    Assessment: Tolerated treatment well  Patient negative for bilateral [place]  Plan: Discharge with instructions to follow up as needed if symptoms return             Precautions: severe BPPV     Daily Treatment Diary       Manuals          L Epley + education education only x2 N/A neg         R Epley + education   1x neg                                   Exercise Diary                                                                                                                                                                                                                                                                                                            Modalities

## 2020-03-19 LAB
LEFT EYE DIABETIC RETINOPATHY: NORMAL
RIGHT EYE DIABETIC RETINOPATHY: NORMAL

## 2020-03-23 DIAGNOSIS — E78.2 MIXED HYPERLIPIDEMIA: ICD-10-CM

## 2020-03-23 RX ORDER — ATORVASTATIN CALCIUM 20 MG/1
20 TABLET, FILM COATED ORAL DAILY
Qty: 90 TABLET | Refills: 3 | Status: SHIPPED | OUTPATIENT
Start: 2020-03-23 | End: 2021-03-29 | Stop reason: SDUPTHER

## 2020-05-01 ENCOUNTER — APPOINTMENT (OUTPATIENT)
Dept: LAB | Facility: CLINIC | Age: 70
End: 2020-05-01
Payer: MEDICARE

## 2020-05-01 DIAGNOSIS — E11.65 TYPE 2 DIABETES MELLITUS WITH HYPERGLYCEMIA, WITHOUT LONG-TERM CURRENT USE OF INSULIN (HCC): ICD-10-CM

## 2020-05-01 DIAGNOSIS — E78.2 MIXED HYPERLIPIDEMIA: ICD-10-CM

## 2020-05-01 DIAGNOSIS — I10 BENIGN ESSENTIAL HYPERTENSION: ICD-10-CM

## 2020-05-01 LAB
ALBUMIN SERPL BCP-MCNC: 3.8 G/DL (ref 3.5–5)
ALP SERPL-CCNC: 83 U/L (ref 46–116)
ALT SERPL W P-5'-P-CCNC: 40 U/L (ref 12–78)
ANION GAP SERPL CALCULATED.3IONS-SCNC: 6 MMOL/L (ref 4–13)
AST SERPL W P-5'-P-CCNC: 25 U/L (ref 5–45)
BILIRUB SERPL-MCNC: 0.95 MG/DL (ref 0.2–1)
BUN SERPL-MCNC: 16 MG/DL (ref 5–25)
CALCIUM SERPL-MCNC: 8.4 MG/DL (ref 8.3–10.1)
CHLORIDE SERPL-SCNC: 108 MMOL/L (ref 100–108)
CHOLEST SERPL-MCNC: 152 MG/DL (ref 50–200)
CO2 SERPL-SCNC: 24 MMOL/L (ref 21–32)
CREAT SERPL-MCNC: 1.04 MG/DL (ref 0.6–1.3)
CREAT UR-MCNC: 215 MG/DL
EST. AVERAGE GLUCOSE BLD GHB EST-MCNC: 154 MG/DL
GFR SERPL CREATININE-BSD FRML MDRD: 71 ML/MIN/1.73SQ M
GLUCOSE P FAST SERPL-MCNC: 149 MG/DL (ref 65–99)
HBA1C MFR BLD: 7 %
HDLC SERPL-MCNC: 50 MG/DL
LDLC SERPL CALC-MCNC: 70 MG/DL (ref 0–100)
MICROALBUMIN UR-MCNC: 52 MG/L (ref 0–20)
MICROALBUMIN/CREAT 24H UR: 24 MG/G CREATININE (ref 0–30)
NONHDLC SERPL-MCNC: 102 MG/DL
POTASSIUM SERPL-SCNC: 3.8 MMOL/L (ref 3.5–5.3)
PROT SERPL-MCNC: 7.5 G/DL (ref 6.4–8.2)
SODIUM SERPL-SCNC: 138 MMOL/L (ref 136–145)
TRIGL SERPL-MCNC: 160 MG/DL

## 2020-05-01 PROCEDURE — 80053 COMPREHEN METABOLIC PANEL: CPT

## 2020-05-01 PROCEDURE — 82043 UR ALBUMIN QUANTITATIVE: CPT

## 2020-05-01 PROCEDURE — 82570 ASSAY OF URINE CREATININE: CPT

## 2020-05-01 PROCEDURE — 36415 COLL VENOUS BLD VENIPUNCTURE: CPT

## 2020-05-01 PROCEDURE — 80061 LIPID PANEL: CPT

## 2020-05-01 PROCEDURE — 83036 HEMOGLOBIN GLYCOSYLATED A1C: CPT

## 2020-05-07 ENCOUNTER — OFFICE VISIT (OUTPATIENT)
Dept: FAMILY MEDICINE CLINIC | Facility: CLINIC | Age: 70
End: 2020-05-07
Payer: MEDICARE

## 2020-05-07 VITALS
TEMPERATURE: 99.5 F | DIASTOLIC BLOOD PRESSURE: 80 MMHG | RESPIRATION RATE: 16 BRPM | HEART RATE: 80 BPM | SYSTOLIC BLOOD PRESSURE: 150 MMHG | BODY MASS INDEX: 35.93 KG/M2 | WEIGHT: 251 LBS | HEIGHT: 70 IN

## 2020-05-07 DIAGNOSIS — E11.42 DIABETIC POLYNEUROPATHY ASSOCIATED WITH TYPE 2 DIABETES MELLITUS (HCC): ICD-10-CM

## 2020-05-07 DIAGNOSIS — E11.65 TYPE 2 DIABETES MELLITUS WITH HYPERGLYCEMIA, WITHOUT LONG-TERM CURRENT USE OF INSULIN (HCC): ICD-10-CM

## 2020-05-07 DIAGNOSIS — K76.0 FATTY LIVER: ICD-10-CM

## 2020-05-07 DIAGNOSIS — E78.2 MIXED HYPERLIPIDEMIA: ICD-10-CM

## 2020-05-07 DIAGNOSIS — R05.3 PERSISTENT DRY COUGH: ICD-10-CM

## 2020-05-07 DIAGNOSIS — E66.01 MORBID OBESITY DUE TO EXCESS CALORIES (HCC): ICD-10-CM

## 2020-05-07 DIAGNOSIS — I10 BENIGN ESSENTIAL HYPERTENSION: Primary | ICD-10-CM

## 2020-05-07 PROBLEM — J96.11 CHRONIC HYPOXEMIC RESPIRATORY FAILURE (HCC): Status: ACTIVE | Noted: 2020-05-07

## 2020-05-07 PROCEDURE — 3079F DIAST BP 80-89 MM HG: CPT | Performed by: FAMILY MEDICINE

## 2020-05-07 PROCEDURE — 3077F SYST BP >= 140 MM HG: CPT | Performed by: FAMILY MEDICINE

## 2020-05-07 PROCEDURE — 3060F POS MICROALBUMINURIA REV: CPT | Performed by: FAMILY MEDICINE

## 2020-05-07 PROCEDURE — 1036F TOBACCO NON-USER: CPT | Performed by: FAMILY MEDICINE

## 2020-05-07 PROCEDURE — 99214 OFFICE O/P EST MOD 30 MIN: CPT | Performed by: FAMILY MEDICINE

## 2020-05-07 PROCEDURE — 4010F ACE/ARB THERAPY RXD/TAKEN: CPT | Performed by: FAMILY MEDICINE

## 2020-05-07 PROCEDURE — 2022F DILAT RTA XM EVC RTNOPTHY: CPT | Performed by: FAMILY MEDICINE

## 2020-05-07 PROCEDURE — 3008F BODY MASS INDEX DOCD: CPT | Performed by: FAMILY MEDICINE

## 2020-05-07 PROCEDURE — 3051F HG A1C>EQUAL 7.0%<8.0%: CPT | Performed by: FAMILY MEDICINE

## 2020-05-07 PROCEDURE — 4040F PNEUMOC VAC/ADMIN/RCVD: CPT | Performed by: FAMILY MEDICINE

## 2020-05-07 PROCEDURE — 1160F RVW MEDS BY RX/DR IN RCRD: CPT | Performed by: FAMILY MEDICINE

## 2020-05-07 RX ORDER — LOSARTAN POTASSIUM 100 MG/1
100 TABLET ORAL DAILY
Qty: 30 TABLET | Refills: 5 | Status: SHIPPED | OUTPATIENT
Start: 2020-05-07 | End: 2020-12-02 | Stop reason: SDUPTHER

## 2020-05-07 RX ORDER — HYDROCHLOROTHIAZIDE 25 MG/1
25 TABLET ORAL DAILY
Qty: 30 TABLET | Refills: 5 | Status: SHIPPED | OUTPATIENT
Start: 2020-05-07 | End: 2020-12-02 | Stop reason: SDUPTHER

## 2020-05-08 DIAGNOSIS — I10 BENIGN ESSENTIAL HYPERTENSION: ICD-10-CM

## 2020-05-15 DIAGNOSIS — I10 ESSENTIAL HYPERTENSION: ICD-10-CM

## 2020-05-15 RX ORDER — AMLODIPINE BESYLATE 5 MG/1
5 TABLET ORAL DAILY
Qty: 90 TABLET | Refills: 3 | Status: SHIPPED | OUTPATIENT
Start: 2020-05-15 | End: 2021-06-03

## 2020-08-25 DIAGNOSIS — R42 DIZZINESS: ICD-10-CM

## 2020-08-25 RX ORDER — MECLIZINE HCL 12.5 MG/1
TABLET ORAL
Qty: 30 TABLET | Refills: 3 | Status: SHIPPED | OUTPATIENT
Start: 2020-08-25 | End: 2021-01-13 | Stop reason: SDUPTHER

## 2020-09-21 DIAGNOSIS — E11.65 TYPE 2 DIABETES MELLITUS WITH HYPERGLYCEMIA, WITHOUT LONG-TERM CURRENT USE OF INSULIN (HCC): ICD-10-CM

## 2020-11-04 ENCOUNTER — APPOINTMENT (OUTPATIENT)
Dept: LAB | Facility: CLINIC | Age: 70
End: 2020-11-04
Payer: MEDICARE

## 2020-11-04 DIAGNOSIS — E11.65 TYPE 2 DIABETES MELLITUS WITH HYPERGLYCEMIA, WITHOUT LONG-TERM CURRENT USE OF INSULIN (HCC): ICD-10-CM

## 2020-11-04 DIAGNOSIS — K76.0 FATTY LIVER: ICD-10-CM

## 2020-11-04 DIAGNOSIS — E78.2 MIXED HYPERLIPIDEMIA: ICD-10-CM

## 2020-11-04 DIAGNOSIS — I10 BENIGN ESSENTIAL HYPERTENSION: ICD-10-CM

## 2020-11-04 LAB
ALBUMIN SERPL BCP-MCNC: 3.6 G/DL (ref 3.5–5)
ALP SERPL-CCNC: 87 U/L (ref 46–116)
ALT SERPL W P-5'-P-CCNC: 47 U/L (ref 12–78)
ANION GAP SERPL CALCULATED.3IONS-SCNC: 7 MMOL/L (ref 4–13)
AST SERPL W P-5'-P-CCNC: 32 U/L (ref 5–45)
BASOPHILS # BLD AUTO: 0.06 THOUSANDS/ΜL (ref 0–0.1)
BASOPHILS NFR BLD AUTO: 1 % (ref 0–1)
BILIRUB SERPL-MCNC: 1.05 MG/DL (ref 0.2–1)
BUN SERPL-MCNC: 14 MG/DL (ref 5–25)
CALCIUM SERPL-MCNC: 9.5 MG/DL (ref 8.3–10.1)
CHLORIDE SERPL-SCNC: 104 MMOL/L (ref 100–108)
CHOLEST SERPL-MCNC: 148 MG/DL (ref 50–200)
CO2 SERPL-SCNC: 30 MMOL/L (ref 21–32)
CREAT SERPL-MCNC: 1.23 MG/DL (ref 0.6–1.3)
CREAT UR-MCNC: 140 MG/DL
EOSINOPHIL # BLD AUTO: 0.45 THOUSAND/ΜL (ref 0–0.61)
EOSINOPHIL NFR BLD AUTO: 5 % (ref 0–6)
ERYTHROCYTE [DISTWIDTH] IN BLOOD BY AUTOMATED COUNT: 13.4 % (ref 11.6–15.1)
EST. AVERAGE GLUCOSE BLD GHB EST-MCNC: 180 MG/DL
GFR SERPL CREATININE-BSD FRML MDRD: 58 ML/MIN/1.73SQ M
GLUCOSE P FAST SERPL-MCNC: 182 MG/DL (ref 65–99)
HBA1C MFR BLD: 7.9 %
HCT VFR BLD AUTO: 43.3 % (ref 36.5–49.3)
HDLC SERPL-MCNC: 51 MG/DL
HGB BLD-MCNC: 14 G/DL (ref 12–17)
IMM GRANULOCYTES # BLD AUTO: 0.03 THOUSAND/UL (ref 0–0.2)
IMM GRANULOCYTES NFR BLD AUTO: 0 % (ref 0–2)
LDLC SERPL CALC-MCNC: 61 MG/DL (ref 0–100)
LYMPHOCYTES # BLD AUTO: 1.68 THOUSANDS/ΜL (ref 0.6–4.47)
LYMPHOCYTES NFR BLD AUTO: 19 % (ref 14–44)
MCH RBC QN AUTO: 29.7 PG (ref 26.8–34.3)
MCHC RBC AUTO-ENTMCNC: 32.3 G/DL (ref 31.4–37.4)
MCV RBC AUTO: 92 FL (ref 82–98)
MICROALBUMIN UR-MCNC: 12 MG/L (ref 0–20)
MICROALBUMIN/CREAT 24H UR: 9 MG/G CREATININE (ref 0–30)
MONOCYTES # BLD AUTO: 0.81 THOUSAND/ΜL (ref 0.17–1.22)
MONOCYTES NFR BLD AUTO: 9 % (ref 4–12)
NEUTROPHILS # BLD AUTO: 5.89 THOUSANDS/ΜL (ref 1.85–7.62)
NEUTS SEG NFR BLD AUTO: 66 % (ref 43–75)
NONHDLC SERPL-MCNC: 97 MG/DL
NRBC BLD AUTO-RTO: 0 /100 WBCS
PLATELET # BLD AUTO: 263 THOUSANDS/UL (ref 149–390)
PMV BLD AUTO: 9.9 FL (ref 8.9–12.7)
POTASSIUM SERPL-SCNC: 3.7 MMOL/L (ref 3.5–5.3)
PROT SERPL-MCNC: 7.4 G/DL (ref 6.4–8.2)
RBC # BLD AUTO: 4.71 MILLION/UL (ref 3.88–5.62)
SODIUM SERPL-SCNC: 141 MMOL/L (ref 136–145)
TRIGL SERPL-MCNC: 181 MG/DL
TSH SERPL DL<=0.05 MIU/L-ACNC: 1.14 UIU/ML (ref 0.36–3.74)
WBC # BLD AUTO: 8.92 THOUSAND/UL (ref 4.31–10.16)

## 2020-11-04 PROCEDURE — 80053 COMPREHEN METABOLIC PANEL: CPT

## 2020-11-04 PROCEDURE — 84443 ASSAY THYROID STIM HORMONE: CPT

## 2020-11-04 PROCEDURE — 82570 ASSAY OF URINE CREATININE: CPT | Performed by: FAMILY MEDICINE

## 2020-11-04 PROCEDURE — 36415 COLL VENOUS BLD VENIPUNCTURE: CPT

## 2020-11-04 PROCEDURE — 82043 UR ALBUMIN QUANTITATIVE: CPT | Performed by: FAMILY MEDICINE

## 2020-11-04 PROCEDURE — 85025 COMPLETE CBC W/AUTO DIFF WBC: CPT

## 2020-11-04 PROCEDURE — 80061 LIPID PANEL: CPT

## 2020-11-04 PROCEDURE — 83036 HEMOGLOBIN GLYCOSYLATED A1C: CPT

## 2020-11-09 ENCOUNTER — OFFICE VISIT (OUTPATIENT)
Dept: FAMILY MEDICINE CLINIC | Facility: CLINIC | Age: 70
End: 2020-11-09
Payer: MEDICARE

## 2020-11-09 VITALS
WEIGHT: 252 LBS | BODY MASS INDEX: 36.08 KG/M2 | HEART RATE: 88 BPM | RESPIRATION RATE: 16 BRPM | TEMPERATURE: 99 F | HEIGHT: 70 IN | SYSTOLIC BLOOD PRESSURE: 138 MMHG | DIASTOLIC BLOOD PRESSURE: 80 MMHG

## 2020-11-09 DIAGNOSIS — E11.65 TYPE 2 DIABETES MELLITUS WITH HYPERGLYCEMIA, WITHOUT LONG-TERM CURRENT USE OF INSULIN (HCC): ICD-10-CM

## 2020-11-09 DIAGNOSIS — E78.2 MIXED HYPERLIPIDEMIA: ICD-10-CM

## 2020-11-09 DIAGNOSIS — E11.42 DIABETIC POLYNEUROPATHY ASSOCIATED WITH TYPE 2 DIABETES MELLITUS (HCC): ICD-10-CM

## 2020-11-09 DIAGNOSIS — Z23 NEED FOR INFLUENZA VACCINATION: ICD-10-CM

## 2020-11-09 DIAGNOSIS — I10 BENIGN ESSENTIAL HYPERTENSION: Primary | ICD-10-CM

## 2020-11-09 DIAGNOSIS — K76.0 FATTY LIVER: ICD-10-CM

## 2020-11-09 DIAGNOSIS — Z12.5 SCREENING FOR PROSTATE CANCER: ICD-10-CM

## 2020-11-09 DIAGNOSIS — E66.01 MORBID OBESITY DUE TO EXCESS CALORIES (HCC): ICD-10-CM

## 2020-11-09 PROCEDURE — 90662 IIV NO PRSV INCREASED AG IM: CPT

## 2020-11-09 PROCEDURE — 99214 OFFICE O/P EST MOD 30 MIN: CPT | Performed by: FAMILY MEDICINE

## 2020-11-09 PROCEDURE — G0008 ADMIN INFLUENZA VIRUS VAC: HCPCS

## 2020-12-02 DIAGNOSIS — I10 BENIGN ESSENTIAL HYPERTENSION: ICD-10-CM

## 2020-12-02 RX ORDER — LOSARTAN POTASSIUM 100 MG/1
100 TABLET ORAL DAILY
Qty: 90 TABLET | Refills: 3 | Status: SHIPPED | OUTPATIENT
Start: 2020-12-02 | End: 2020-12-07 | Stop reason: SDUPTHER

## 2020-12-02 RX ORDER — HYDROCHLOROTHIAZIDE 25 MG/1
25 TABLET ORAL DAILY
Qty: 90 TABLET | Refills: 3 | Status: SHIPPED | OUTPATIENT
Start: 2020-12-02 | End: 2020-12-07 | Stop reason: SDUPTHER

## 2020-12-07 DIAGNOSIS — I10 BENIGN ESSENTIAL HYPERTENSION: ICD-10-CM

## 2020-12-07 RX ORDER — HYDROCHLOROTHIAZIDE 25 MG/1
25 TABLET ORAL DAILY
Qty: 90 TABLET | Refills: 3 | Status: SHIPPED | OUTPATIENT
Start: 2020-12-07 | End: 2022-03-03 | Stop reason: SDUPTHER

## 2020-12-07 RX ORDER — LOSARTAN POTASSIUM 100 MG/1
100 TABLET ORAL DAILY
Qty: 90 TABLET | Refills: 3 | Status: SHIPPED | OUTPATIENT
Start: 2020-12-07 | End: 2022-03-03 | Stop reason: SDUPTHER

## 2021-01-13 DIAGNOSIS — R42 DIZZINESS: ICD-10-CM

## 2021-01-13 RX ORDER — MECLIZINE HCL 12.5 MG/1
TABLET ORAL
Qty: 30 TABLET | Refills: 3 | Status: SHIPPED | OUTPATIENT
Start: 2021-01-13 | End: 2021-06-21 | Stop reason: SDUPTHER

## 2021-03-04 ENCOUNTER — LAB (OUTPATIENT)
Dept: LAB | Facility: CLINIC | Age: 71
End: 2021-03-04
Payer: MEDICARE

## 2021-03-04 DIAGNOSIS — E11.65 TYPE 2 DIABETES MELLITUS WITH HYPERGLYCEMIA, WITHOUT LONG-TERM CURRENT USE OF INSULIN (HCC): ICD-10-CM

## 2021-03-04 DIAGNOSIS — I10 BENIGN ESSENTIAL HYPERTENSION: ICD-10-CM

## 2021-03-04 DIAGNOSIS — E78.2 MIXED HYPERLIPIDEMIA: ICD-10-CM

## 2021-03-04 DIAGNOSIS — Z12.5 SCREENING FOR PROSTATE CANCER: ICD-10-CM

## 2021-03-04 LAB
ALBUMIN SERPL BCP-MCNC: 3.7 G/DL (ref 3.5–5)
ALP SERPL-CCNC: 88 U/L (ref 46–116)
ALT SERPL W P-5'-P-CCNC: 34 U/L (ref 12–78)
ANION GAP SERPL CALCULATED.3IONS-SCNC: 7 MMOL/L (ref 4–13)
AST SERPL W P-5'-P-CCNC: 23 U/L (ref 5–45)
BILIRUB SERPL-MCNC: 1.08 MG/DL (ref 0.2–1)
BUN SERPL-MCNC: 20 MG/DL (ref 5–25)
CALCIUM SERPL-MCNC: 9.5 MG/DL (ref 8.3–10.1)
CHLORIDE SERPL-SCNC: 105 MMOL/L (ref 100–108)
CHOLEST SERPL-MCNC: 162 MG/DL (ref 50–200)
CO2 SERPL-SCNC: 29 MMOL/L (ref 21–32)
CREAT SERPL-MCNC: 1.26 MG/DL (ref 0.6–1.3)
CREAT UR-MCNC: 143 MG/DL
EST. AVERAGE GLUCOSE BLD GHB EST-MCNC: 166 MG/DL
GFR SERPL CREATININE-BSD FRML MDRD: 56 ML/MIN/1.73SQ M
GLUCOSE P FAST SERPL-MCNC: 144 MG/DL (ref 65–99)
HBA1C MFR BLD: 7.4 %
HDLC SERPL-MCNC: 49 MG/DL
LDLC SERPL CALC-MCNC: 77 MG/DL (ref 0–100)
MICROALBUMIN UR-MCNC: 9.6 MG/L (ref 0–20)
MICROALBUMIN/CREAT 24H UR: 7 MG/G CREATININE (ref 0–30)
NONHDLC SERPL-MCNC: 113 MG/DL
POTASSIUM SERPL-SCNC: 3.8 MMOL/L (ref 3.5–5.3)
PROT SERPL-MCNC: 7.8 G/DL (ref 6.4–8.2)
PSA SERPL-MCNC: 1.1 NG/ML (ref 0–4)
SODIUM SERPL-SCNC: 141 MMOL/L (ref 136–145)
TRIGL SERPL-MCNC: 181 MG/DL

## 2021-03-04 PROCEDURE — G0103 PSA SCREENING: HCPCS

## 2021-03-04 PROCEDURE — 82043 UR ALBUMIN QUANTITATIVE: CPT

## 2021-03-04 PROCEDURE — 36415 COLL VENOUS BLD VENIPUNCTURE: CPT

## 2021-03-04 PROCEDURE — 83036 HEMOGLOBIN GLYCOSYLATED A1C: CPT

## 2021-03-04 PROCEDURE — 80061 LIPID PANEL: CPT

## 2021-03-04 PROCEDURE — 82570 ASSAY OF URINE CREATININE: CPT

## 2021-03-04 PROCEDURE — 80053 COMPREHEN METABOLIC PANEL: CPT

## 2021-03-10 ENCOUNTER — OFFICE VISIT (OUTPATIENT)
Dept: FAMILY MEDICINE CLINIC | Facility: CLINIC | Age: 71
End: 2021-03-10
Payer: MEDICARE

## 2021-03-10 VITALS
TEMPERATURE: 98.6 F | RESPIRATION RATE: 14 BRPM | WEIGHT: 247 LBS | BODY MASS INDEX: 35.36 KG/M2 | HEART RATE: 80 BPM | SYSTOLIC BLOOD PRESSURE: 130 MMHG | DIASTOLIC BLOOD PRESSURE: 78 MMHG | OXYGEN SATURATION: 94 % | HEIGHT: 70 IN

## 2021-03-10 DIAGNOSIS — E11.65 TYPE 2 DIABETES MELLITUS WITH HYPERGLYCEMIA, WITHOUT LONG-TERM CURRENT USE OF INSULIN (HCC): ICD-10-CM

## 2021-03-10 DIAGNOSIS — E11.42 DIABETIC POLYNEUROPATHY ASSOCIATED WITH TYPE 2 DIABETES MELLITUS (HCC): ICD-10-CM

## 2021-03-10 DIAGNOSIS — K76.0 FATTY LIVER: ICD-10-CM

## 2021-03-10 DIAGNOSIS — R06.02 EXERTIONAL SHORTNESS OF BREATH: ICD-10-CM

## 2021-03-10 DIAGNOSIS — E78.2 MIXED HYPERLIPIDEMIA: ICD-10-CM

## 2021-03-10 DIAGNOSIS — Z00.00 MEDICARE ANNUAL WELLNESS VISIT, SUBSEQUENT: ICD-10-CM

## 2021-03-10 DIAGNOSIS — E66.01 MORBID OBESITY DUE TO EXCESS CALORIES (HCC): ICD-10-CM

## 2021-03-10 DIAGNOSIS — I10 BENIGN ESSENTIAL HYPERTENSION: Primary | ICD-10-CM

## 2021-03-10 DIAGNOSIS — Z12.11 SCREENING FOR COLON CANCER: ICD-10-CM

## 2021-03-10 DIAGNOSIS — R05.3 CHRONIC COUGH: ICD-10-CM

## 2021-03-10 PROCEDURE — 1123F ACP DISCUSS/DSCN MKR DOCD: CPT | Performed by: FAMILY MEDICINE

## 2021-03-10 PROCEDURE — 99215 OFFICE O/P EST HI 40 MIN: CPT | Performed by: FAMILY MEDICINE

## 2021-03-10 PROCEDURE — G0438 PPPS, INITIAL VISIT: HCPCS | Performed by: FAMILY MEDICINE

## 2021-03-10 NOTE — PROGRESS NOTES
Assessment/Plan:    Benign essential hypertension  BP is stable  Continue Losartan, HCTZ, Amlodipine, Metoprolol  Follow a low-sodium diet  Type 2 diabetes mellitus with hyperglycemia (HCC)    Lab Results   Component Value Date    HGBA1C 7 4 (H) 03/04/2021     Hb A1C improved since November 2020 but still suboptimal   Goal for Hb A1C  7 0 or less  Continue Metformin 1000 mg twice daily with meals  Patient does not want to add Januvia or Tradjenta to his medical regimen, will work on dietary modifications and weight reduction  Patient has appointment with ophthalmologist Dr Abdullahi Anderson this month for diabetic eye exam     Follow-up with podiatry for routine foot care  Diabetic neuropathy associated with type 2 diabetes mellitus ([facility] )    Lab Results   Component Value Date    HGBA1C 7 4 (H) 03/04/2021     Patient reports improvement in numbness in his feet  Stressed the importance of keeping blood sugar under control to prevent neurological complications  Mixed hyperlipidemia  Continue Atorvastatin 20 mg daily  Follow a low-cholesterol, low-fat diet, encouraged regular exercise  Morbid obesity due to excess calories [facility])  Patient lost 5 lb since last visit in November 2020  Encouraged to work weight reduction  Chronic cough  Patient continues with chronic cough, exertional shortness of breath  Check chest x-ray  Schedule reevaluation by pulmonology, check  6 minutes walk PFT's  Fatty liver  LFTs -within normal range  Continue Vit E 400 IU daily  Follow a low-fat diet, lose weight  Exertional shortness of breath  Patient had nuclear stress test done in April 2018 which showed no evidence of cardiac ischemia  Patient declined using suppmental oxygen with exertional activities as recommended by pulmonologist Dr Suman Giordano in 2018  Recommended re-evaluation by pulmonology  Work on weigh loss  HM: patient refusing colonoscopy     Will order Cologuard test     Schedule follow-up office visit in 4- 6 months  Check labs prior to next visit  Diagnoses and all orders for this visit:    Benign essential hypertension  -     Comprehensive metabolic panel; Future  -     CBC and differential; Future    Type 2 diabetes mellitus with hyperglycemia, without long-term current use of insulin (HCC)  -     Hemoglobin A1C; Future  -     Comprehensive metabolic panel; Future  -     CBC and differential; Future    Diabetic polyneuropathy associated with type 2 diabetes mellitus (HCC)    Mixed hyperlipidemia  -     Lipid panel; Future  -     CBC and differential; Future    Morbid obesity due to excess calories (HCC)    Chronic cough  -     XR chest pa & lateral; Future  -     Ambulatory referral to Pulmonology; Future    Fatty liver  -     Comprehensive metabolic panel; Future    Medicare annual wellness visit, subsequent    Screening for colon cancer  -     Cologuard; Future    Exertional shortness of breath          Subjective:      Patient ID: Campos Altamirano is a 66 y o  male  HPI     Patient presents for 4 month follow-up visit  Reviewed current medications, blood test results from 3/4/21  PSA 1 1  Hb A1c 7 4 ( improved from 7 9 in 11/20) Fasting blood sugar 144, creatinine 1 26, potassium 3 8, LFTs - within normal range  Cholesterol 162, triglycerides 181, HDL 49, LDL 77  HTN - blood pressure remains stable  Patient takes Losartan 100 mg 1 tablet daily, HCTZ 25 mg daily, Amlodipine 5 mg daily, Metoprolol 25 mg -1/2  tablet twice daily  Denies chest pain, dizziness  Patient c/o chronic cough, mainly dry  Reports some clear mucus in the morning, exertional SOB  Denies fever, chills  No pets at home now  Patient had nuclear stress test test done in April 2018 which showed no evidence of cardiac ischemia      Patient was evaluated by pulmonologist Dr Fide Moreno  in 2018, diagnosed with hypersensitivity pneumonitis given the exposure to cockatiel  He  was treated with Prednisone and recommended to use oxygen  Patient declined using supplemental oxygen with exertional activities  Type 2 DM - patient takes Metformin 1000 mg twice daily  He declined to starting onTradjenta or Januvia at the last visit  Admits to some dietary indiscretions  Patient is due for eye exam with ophthalmologist Dr Leslie Vásquez this month  Hyperlipidemia- on Atorvastatin 20 mg daily  Denies side effects from statin therapy  Fatty liver - patient takes Vit E 400 IU daily     Morbid obesity - lost 5 lb since last visit in 11/2020  Patient mckee not exercises regularly  Refusing colonoscopy  Prevnar 13 done in December 2015  The following portions of the patient's history were reviewed and updated as appropriate: allergies, past family history, past medical history, past social history, past surgical history and problem list     Review of Systems   Constitutional: Positive for fatigue (mild)  Negative for activity change, appetite change, chills and fever  HENT: Negative for congestion, ear pain, hearing loss, mouth sores, nosebleeds, postnasal drip, sore throat, tinnitus and trouble swallowing  Eyes: Negative for pain, discharge, redness, itching and visual disturbance  Respiratory: Positive for cough (chronic) and shortness of breath (stable exertional SOB)  Negative for chest tightness and wheezing  Cardiovascular: Negative for chest pain, palpitations and leg swelling  Gastrointestinal: Negative for abdominal pain, blood in stool, constipation, diarrhea, nausea and vomiting  Genitourinary: Negative for difficulty urinating, dysuria, flank pain, frequency and hematuria  Nocturia x 1   Musculoskeletal: Negative for arthralgias, back pain, gait problem, joint swelling and neck pain  Skin: Negative for rash and wound  Neurological: Positive for numbness (in feet improved)  Negative for dizziness, syncope and headaches  Hematological: Negative  Psychiatric/Behavioral: Negative for dysphoric mood and sleep disturbance  The patient is not nervous/anxious  Objective:      /78 (BP Location: Left arm, Patient Position: Sitting, Cuff Size: Large)   Pulse 80   Temp 98 6 °F (37 °C) (Tympanic)   Resp 14   Ht 5' 10" (1 778 m)   Wt 112 kg (247 lb)   SpO2 94%   BMI 35 44 kg/m²          Physical Exam  Vitals signs and nursing note reviewed  Constitutional:       Appearance: Normal appearance  He is obese  HENT:      Head: Normocephalic and atraumatic  Right Ear: Tympanic membrane normal       Left Ear: Tympanic membrane normal    Eyes:      Conjunctiva/sclera: Conjunctivae normal       Pupils: Pupils are equal, round, and reactive to light  Neck:      Musculoskeletal: Normal range of motion and neck supple  Vascular: No carotid bruit  Cardiovascular:      Rate and Rhythm: Normal rate and regular rhythm  Heart sounds: No murmur  Pulmonary:      Effort: Pulmonary effort is normal       Breath sounds: No wheezing  Comments: Rhonchi   Abdominal:      General: Bowel sounds are normal  There is no distension  Palpations: Abdomen is soft  Tenderness: There is no abdominal tenderness  Musculoskeletal: Normal range of motion  General: No deformity  Right lower leg: No edema  Left lower leg: No edema  Skin:     General: Skin is warm and dry  Findings: No rash  Neurological:      General: No focal deficit present  Mental Status: He is alert and oriented to person, place, and time  Mental status is at baseline  Motor: No weakness        Gait: Gait normal    Psychiatric:         Mood and Affect: Mood normal

## 2021-03-10 NOTE — PATIENT INSTRUCTIONS

## 2021-03-10 NOTE — PROGRESS NOTES
Assessment and Plan:     Problem List Items Addressed This Visit        Digestive    Fatty liver       Endocrine    Type 2 diabetes mellitus with hyperglycemia ([facility] )    Diabetic neuropathy associated with type 2 diabetes mellitus ([facility] )       Cardiovascular and Mediastinum    Benign essential hypertension - Primary       Other    Mixed hyperlipidemia    Morbid obesity due to excess calories ([facility] )    Medicare annual wellness visit, subsequent    Persistent dry cough        BMI Counseling: Body mass index is 35 44 kg/m²  The BMI is above normal  Nutrition recommendations include decreasing portion sizes, encouraging healthy choices of fruits and vegetables, decreasing fast food intake, consuming healthier snacks, limiting drinks that contain sugar, moderation in carbohydrate intake, increasing intake of lean protein, reducing intake of saturated and trans fat and reducing intake of cholesterol  Exercise recommendations include exercising 3-5 times per week  No pharmacotherapy was ordered  Preventive health issues were discussed with patient, and age appropriate screening tests were ordered as noted in patient's After Visit Summary  Personalized health advice and appropriate referrals for health education or preventive services given if needed, as noted in patient's After Visit Summary       History of Present Illness:     Patient presents for Medicare Annual Wellness visit    Patient Care Team:  Shayy Carmona MD as PCP - General     Problem List:     Patient Active Problem List   Diagnosis    Abnormal EKG    Benign essential hypertension    PVC (premature ventricular contraction)    Elevated liver enzymes    Exertional shortness of breath    Fatty liver    Glaucoma    Mixed hyperlipidemia    Morbid obesity due to excess calories ([facility] )    Sinus tachycardia    Type 2 diabetes mellitus with hyperglycemia ([facility] )    Vitamin D deficiency    Hypoxia    Medicare annual wellness visit, subsequent  [facility]ersensitivity pneumonitis ([facility] )    Diabetic neuropathy associated with type 2 diabetes mellitus (HCC)    Dizziness/Vertigo    Low TSH level    Chronic hypoxemic respiratory failure (HCC)    Persistent dry cough      Past Medical and Surgical History:     Past Medical History:   Diagnosis Date    Cataract, left     last assessed 13Jul2016    Epistaxis     last assessed 16Jan2017    Exertional shortness of breath     last assessed 16jan2018    IFG (impaired fasting glucose)     last assessed 01Apr2016     Past Surgical History:   Procedure Laterality Date    CATARACT EXTRACTION Left 07/01/2016    L eye cataract surgery - 7/16      Family History:     Family History   Problem Relation Age of Onset    Stomach cancer Father     No Known Problems Mother       Social History:        Social History     Socioeconomic History    Marital status: /Civil Union     Spouse name: Not on file    Number of children: Not on file    Years of education: Not on file    Highest education level: Not on file   Occupational History    Not on file   Social Needs    Financial resource strain: Not on file    Food insecurity     Worry: Not on file     Inability: Not on file    Transportation needs     Medical: Not on file     Non-medical: Not on file   Tobacco Use    Smoking status: Never Smoker    Smokeless tobacco: Never Used   Substance and Sexual Activity    Alcohol use: No     Frequency: Never     Drinks per session: 1 or 2     Binge frequency: Never    Drug use: Never    Sexual activity: Not Currently   Lifestyle    Physical activity     Days per week: Not on file     Minutes per session: Not on file    Stress: Not on file   Relationships    Social connections     Talks on phone: Not on file     Gets together: Not on file     Attends [employer] service: Not on file     Active member of club or organization: Not on file     Attends meetings of clubs or organizations: Not on file     Relationship status: Not on file    Intimate partner violence     Fear of current or ex partner: Not on file     Emotionally abused: Not on file     Physically abused: Not on file     Forced sexual activity: Not on file   Other Topics Concern    Not on file   Social History Narrative    Not on file      Medications and Allergies:     Current Outpatient Medications   Medication Sig Dispense Refill    amLODIPine (NORVASC) 5 mg tablet Take 1 tablet (5 mg total) by mouth daily 90 tablet 3    aspirin 325 mg tablet Take 1 tablet by mouth daily       atorvastatin (LIPITOR) 20 mg tablet Take 1 tablet (20 mg total) by mouth daily 90 tablet 3    cholecalciferol (VITAMIN D3) 1,000 units tablet Take 1 tablet by mouth daily      glucose blood (ONE TOUCH ULTRA TEST) test strip Test sugar 2-3 times daily 300 each 3    hydrochlorothiazide (HYDRODIURIL) 25 mg tablet Take 1 tablet (25 mg total) by mouth daily 90 tablet 3    latanoprost (XALATAN) 0 005 % ophthalmic solution Apply to eye      Keystone Heart UNISTIK II LANCETS MISC by Does not apply route 3 (three) times a day for 90 days 300 each 3    losartan (COZAAR) 100 MG tablet Take 1 tablet (100 mg total) by mouth daily 90 tablet 3    meclizine (ANTIVERT) 12 5 MG tablet Take 1 tab  every 6-8 hr  PRN for dizziness 30 tablet 3    metFORMIN (GLUCOPHAGE) 1000 MG tablet Take 1 tablet by mouth twice daily 180 tablet 3    metoprolol tartrate (LOPRESSOR) 25 mg tablet Take 0 5 tablets (12 5 mg total) by mouth 2 (two) times a day 90 tablet 3    Multiple Vitamins-Minerals (MULTIVITAL-M) TABS Take 1 tablet by mouth daily      Omega-3 Fatty Acids (FISH OIL) 1,000 mg Take by mouth 2 (two) times a day      vitamin E, tocopherol, 400 units capsule Take by mouth daily       No current facility-administered medications for this visit        No Known Allergies   Immunizations:     Immunization History   Administered Date(s) Administered    Influenza Split High Dose Preservative Free IM 12/04/2015, 01/16/2017, 01/16/2018    Influenza, high dose seasonal 0 7 mL 10/23/2018, 10/17/2019, 11/09/2020    Pneumococcal Conjugate 13-Valent 12/04/2015    Pneumococcal Polysaccharide PPV23 10/31/2019      Health Maintenance:         Topic Date Due    Colorectal Cancer Screening  01/10/1998    Hepatitis C Screening  Completed         Topic Date Due    COVID-19 Vaccine (1 of 2) 01/10/1966    DTaP,Tdap,and Td Vaccines (1 - Tdap) 01/10/1969      Medicare Health Risk Assessment:     There were no vitals taken for this visit  Derek Delarosa is here for his Subsequent Wellness visit  Health Risk Assessment:   Patient rates overall health as good  Patient feels that their physical health rating is same  Patient is satisfied with their life  Eyesight was rated as slightly worse  Hearing was rated as same  Patient feels that their emotional and mental health rating is same  Patients states they are never, rarely angry  Patient states they are never, rarely unusually tired/fatigued  Pain experienced in the last 7 days has been none  Patient states that he has experienced no weight loss or gain in last 6 months  Depression Screening:   PHQ-2 Score: 0      Fall Risk Screening: In the past year, patient has experienced: no history of falling in past year      Home Safety:  Patient does not have trouble with stairs inside or outside of their home  Patient has working smoke alarms and has working carbon monoxide detector  Home safety hazards include: none  Nutrition:   Current diet is Regular and No Added Salt  Medications:   Patient is currently taking over-the-counter supplements  OTC medications include: see medication list  Patient is able to manage medications  Activities of Daily Living (ADLs)/Instrumental Activities of Daily Living (IADLs):   Walk and transfer into and out of bed and chair?: Yes  Dress and groom yourself?: Yes    Bathe or shower yourself?: Yes    Feed yourself?  Yes  Do your laundry/housekeeping?: Yes  Manage your money, pay your bills and track your expenses?: Yes  Make your own meals?: Yes    Do your own shopping?: Yes    Previous Hospitalizations:   Any hospitalizations or ED visits within the last 12 months?: No      Advance Care Planning:   Living will: No    Durable POA for healthcare: No    Advanced directive: No    Advanced directive counseling given: Yes    Five wishes given: Yes      Cognitive Screening:   Provider or family/friend/caregiver concerned regarding cognition?: No    PREVENTIVE SCREENINGS      Cardiovascular Screening:    General: Screening Not Indicated and History Lipid Disorder      Diabetes Screening:     General: Screening Not Indicated and History Diabetes      Colorectal Cancer Screening:     General: Risks and Benefits Discussed    Due for: Cologuard      Prostate Cancer Screening:    General: Screening Current      Osteoporosis Screening:    General: Screening Not Indicated      Abdominal Aortic Aneurysm (AAA) Screening:    Risk factors include: age between 73-67 yo        General: Screening Not Indicated      Lung Cancer Screening:     General: Screening Not Indicated      Hepatitis C Screening:    General: Screening Current    Screening, Brief Intervention, and Referral to Treatment (SBIRT)    Screening      Single Item Drug Screening:  How often have you used an illegal drug (including marijuana) or a prescription medication for non-medical reasons in the past year? never    Single Item Drug Screen Score: 0  Interpretation: Negative screen for possible drug use disorder    Other Counseling Topics:   Car/seat belt/driving safety, skin self-exam and calcium and vitamin D intake and regular weightbearing exercise         Ricky Conroy MD

## 2021-03-11 NOTE — ASSESSMENT & PLAN NOTE
Patient continues with chronic cough, exertional shortness of breath  Check chest x-ray  Schedule reevaluation by pulmonology, check  6 minutes walk PFT's

## 2021-03-11 NOTE — ASSESSMENT & PLAN NOTE
Continue Atorvastatin 20 mg daily  Follow a low-cholesterol, low-fat diet, encouraged regular exercise

## 2021-03-11 NOTE — ASSESSMENT & PLAN NOTE
Patient had nuclear stress test done in April 2018 which showed no evidence of cardiac ischemia  Patient declined using suppmental oxygen with exertional activities as recommended by pulmonologist Dr Christy Swartz in 2018  Recommended re-evaluation by pulmonology  Work on weigh loss

## 2021-03-11 NOTE — ASSESSMENT & PLAN NOTE
Lab Results   Component Value Date    HGBA1C 7 4 (H) 03/04/2021     Hb A1C improved since November 2020 but still suboptimal   Goal for Hb A1C  7 0 or less  Continue Metformin 1000 mg twice daily with meals  Patient does not want to add Januvia or Tradjenta to his medical regimen, will work on dietary modifications and weight reduction  Patient has appointment with ophthalmologist Dr Jeremias Herrera this month for diabetic eye exam     Follow-up with podiatry for routine foot care

## 2021-03-11 NOTE — ASSESSMENT & PLAN NOTE
Lab Results   Component Value Date    HGBA1C 7 4 (H) 03/04/2021     Patient reports improvement in numbness in his feet  Stressed the importance of keeping blood sugar under control to prevent neurological complications

## 2021-03-29 DIAGNOSIS — E78.2 MIXED HYPERLIPIDEMIA: ICD-10-CM

## 2021-03-29 LAB
LEFT EYE DIABETIC RETINOPATHY: NORMAL
RIGHT EYE DIABETIC RETINOPATHY: NORMAL

## 2021-03-29 RX ORDER — ATORVASTATIN CALCIUM 20 MG/1
20 TABLET, FILM COATED ORAL DAILY
Qty: 90 TABLET | Refills: 3 | Status: SHIPPED | OUTPATIENT
Start: 2021-03-29 | End: 2022-03-29 | Stop reason: SDUPTHER

## 2021-03-31 DIAGNOSIS — E11.65 TYPE 2 DIABETES MELLITUS WITH HYPERGLYCEMIA, WITHOUT LONG-TERM CURRENT USE OF INSULIN (HCC): ICD-10-CM

## 2021-03-31 NOTE — TELEPHONE ENCOUNTER
I cannot sign order for test strip in Epic  Patient was asking for a new glucometer and test strips  Please fax Rx to the pharmacy

## 2021-04-01 DIAGNOSIS — E11.65 TYPE 2 DIABETES MELLITUS WITH HYPERGLYCEMIA, WITHOUT LONG-TERM CURRENT USE OF INSULIN (HCC): ICD-10-CM

## 2021-04-01 NOTE — TELEPHONE ENCOUNTER
Kristen Cotton, Pharmacist, Danny Jackson phoned to state the script needs to state testing 3x a day for Medicare to process/approve the request

## 2021-04-02 DIAGNOSIS — E11.65 TYPE 2 DIABETES MELLITUS WITH HYPERGLYCEMIA, WITHOUT LONG-TERM CURRENT USE OF INSULIN (HCC): Primary | ICD-10-CM

## 2021-06-03 DIAGNOSIS — I10 ESSENTIAL HYPERTENSION: ICD-10-CM

## 2021-06-03 RX ORDER — AMLODIPINE BESYLATE 5 MG/1
TABLET ORAL
Qty: 90 TABLET | Refills: 0 | Status: SHIPPED | OUTPATIENT
Start: 2021-06-03 | End: 2021-09-02 | Stop reason: SDUPTHER

## 2021-06-21 DIAGNOSIS — I10 BENIGN ESSENTIAL HYPERTENSION: ICD-10-CM

## 2021-06-21 DIAGNOSIS — R42 DIZZINESS: ICD-10-CM

## 2021-06-21 RX ORDER — MECLIZINE HCL 12.5 MG/1
TABLET ORAL
Qty: 30 TABLET | Refills: 3 | Status: SHIPPED | OUTPATIENT
Start: 2021-06-21 | End: 2021-11-10 | Stop reason: SDUPTHER

## 2021-06-30 ENCOUNTER — PREPPED CHART (OUTPATIENT)
Dept: URBAN - METROPOLITAN AREA CLINIC 6 | Facility: CLINIC | Age: 71
End: 2021-06-30

## 2021-09-02 DIAGNOSIS — I10 ESSENTIAL HYPERTENSION: ICD-10-CM

## 2021-09-02 RX ORDER — AMLODIPINE BESYLATE 5 MG/1
5 TABLET ORAL DAILY
Qty: 90 TABLET | Refills: 2 | Status: SHIPPED | OUTPATIENT
Start: 2021-09-02 | End: 2022-06-06 | Stop reason: SDUPTHER

## 2021-09-08 ENCOUNTER — APPOINTMENT (OUTPATIENT)
Dept: LAB | Facility: CLINIC | Age: 71
End: 2021-09-08
Payer: MEDICARE

## 2021-09-08 DIAGNOSIS — I10 BENIGN ESSENTIAL HYPERTENSION: ICD-10-CM

## 2021-09-08 DIAGNOSIS — E11.65 TYPE 2 DIABETES MELLITUS WITH HYPERGLYCEMIA, WITHOUT LONG-TERM CURRENT USE OF INSULIN (HCC): ICD-10-CM

## 2021-09-08 DIAGNOSIS — E78.2 MIXED HYPERLIPIDEMIA: ICD-10-CM

## 2021-09-08 DIAGNOSIS — K76.0 FATTY LIVER: ICD-10-CM

## 2021-09-08 LAB
ALBUMIN SERPL BCP-MCNC: 3.7 G/DL (ref 3.5–5)
ALP SERPL-CCNC: 79 U/L (ref 46–116)
ALT SERPL W P-5'-P-CCNC: 48 U/L (ref 12–78)
ANION GAP SERPL CALCULATED.3IONS-SCNC: 7 MMOL/L (ref 4–13)
AST SERPL W P-5'-P-CCNC: 37 U/L (ref 5–45)
BASOPHILS # BLD AUTO: 0.05 THOUSANDS/ΜL (ref 0–0.1)
BASOPHILS NFR BLD AUTO: 1 % (ref 0–1)
BILIRUB SERPL-MCNC: 1.23 MG/DL (ref 0.2–1)
BUN SERPL-MCNC: 15 MG/DL (ref 5–25)
CALCIUM SERPL-MCNC: 9.1 MG/DL (ref 8.3–10.1)
CHLORIDE SERPL-SCNC: 102 MMOL/L (ref 100–108)
CHOLEST SERPL-MCNC: 145 MG/DL (ref 50–200)
CO2 SERPL-SCNC: 28 MMOL/L (ref 21–32)
CREAT SERPL-MCNC: 1.11 MG/DL (ref 0.6–1.3)
EOSINOPHIL # BLD AUTO: 0.32 THOUSAND/ΜL (ref 0–0.61)
EOSINOPHIL NFR BLD AUTO: 3 % (ref 0–6)
ERYTHROCYTE [DISTWIDTH] IN BLOOD BY AUTOMATED COUNT: 13.1 % (ref 11.6–15.1)
EST. AVERAGE GLUCOSE BLD GHB EST-MCNC: 169 MG/DL
GFR SERPL CREATININE-BSD FRML MDRD: 66 ML/MIN/1.73SQ M
GLUCOSE P FAST SERPL-MCNC: 152 MG/DL (ref 65–99)
HBA1C MFR BLD: 7.5 %
HCT VFR BLD AUTO: 44.3 % (ref 36.5–49.3)
HDLC SERPL-MCNC: 46 MG/DL
HGB BLD-MCNC: 14.8 G/DL (ref 12–17)
IMM GRANULOCYTES # BLD AUTO: 0.05 THOUSAND/UL (ref 0–0.2)
IMM GRANULOCYTES NFR BLD AUTO: 1 % (ref 0–2)
LDLC SERPL CALC-MCNC: 59 MG/DL (ref 0–100)
LYMPHOCYTES # BLD AUTO: 1.55 THOUSANDS/ΜL (ref 0.6–4.47)
LYMPHOCYTES NFR BLD AUTO: 16 % (ref 14–44)
MCH RBC QN AUTO: 30.5 PG (ref 26.8–34.3)
MCHC RBC AUTO-ENTMCNC: 33.4 G/DL (ref 31.4–37.4)
MCV RBC AUTO: 91 FL (ref 82–98)
MONOCYTES # BLD AUTO: 0.84 THOUSAND/ΜL (ref 0.17–1.22)
MONOCYTES NFR BLD AUTO: 9 % (ref 4–12)
NEUTROPHILS # BLD AUTO: 6.82 THOUSANDS/ΜL (ref 1.85–7.62)
NEUTS SEG NFR BLD AUTO: 70 % (ref 43–75)
NONHDLC SERPL-MCNC: 99 MG/DL
NRBC BLD AUTO-RTO: 0 /100 WBCS
PLATELET # BLD AUTO: 268 THOUSANDS/UL (ref 149–390)
PMV BLD AUTO: 10.5 FL (ref 8.9–12.7)
POTASSIUM SERPL-SCNC: 3.9 MMOL/L (ref 3.5–5.3)
PROT SERPL-MCNC: 7.4 G/DL (ref 6.4–8.2)
RBC # BLD AUTO: 4.86 MILLION/UL (ref 3.88–5.62)
SODIUM SERPL-SCNC: 137 MMOL/L (ref 136–145)
TRIGL SERPL-MCNC: 198 MG/DL
WBC # BLD AUTO: 9.63 THOUSAND/UL (ref 4.31–10.16)

## 2021-09-08 PROCEDURE — 83036 HEMOGLOBIN GLYCOSYLATED A1C: CPT

## 2021-09-08 PROCEDURE — 85025 COMPLETE CBC W/AUTO DIFF WBC: CPT

## 2021-09-08 PROCEDURE — 36415 COLL VENOUS BLD VENIPUNCTURE: CPT

## 2021-09-08 PROCEDURE — 80053 COMPREHEN METABOLIC PANEL: CPT

## 2021-09-08 PROCEDURE — 80061 LIPID PANEL: CPT

## 2021-10-05 ENCOUNTER — OFFICE VISIT (OUTPATIENT)
Dept: FAMILY MEDICINE CLINIC | Facility: CLINIC | Age: 71
End: 2021-10-05
Payer: MEDICARE

## 2021-10-05 VITALS
HEIGHT: 70 IN | HEART RATE: 86 BPM | BODY MASS INDEX: 35.65 KG/M2 | TEMPERATURE: 98.4 F | RESPIRATION RATE: 16 BRPM | WEIGHT: 249 LBS | SYSTOLIC BLOOD PRESSURE: 130 MMHG | OXYGEN SATURATION: 94 % | DIASTOLIC BLOOD PRESSURE: 84 MMHG

## 2021-10-05 DIAGNOSIS — E11.65 TYPE 2 DIABETES MELLITUS WITH HYPERGLYCEMIA, WITHOUT LONG-TERM CURRENT USE OF INSULIN (HCC): ICD-10-CM

## 2021-10-05 DIAGNOSIS — E66.01 MORBID OBESITY DUE TO EXCESS CALORIES (HCC): ICD-10-CM

## 2021-10-05 DIAGNOSIS — E78.2 MIXED HYPERLIPIDEMIA: ICD-10-CM

## 2021-10-05 DIAGNOSIS — K76.0 FATTY LIVER: ICD-10-CM

## 2021-10-05 DIAGNOSIS — E11.42 DIABETIC POLYNEUROPATHY ASSOCIATED WITH TYPE 2 DIABETES MELLITUS (HCC): ICD-10-CM

## 2021-10-05 DIAGNOSIS — E80.6 HYPERBILIRUBINEMIA: ICD-10-CM

## 2021-10-05 DIAGNOSIS — I10 BENIGN ESSENTIAL HYPERTENSION: Primary | ICD-10-CM

## 2021-10-05 PROCEDURE — 99214 OFFICE O/P EST MOD 30 MIN: CPT | Performed by: FAMILY MEDICINE

## 2021-10-05 RX ORDER — EMPAGLIFLOZIN 10 MG/1
10 TABLET, FILM COATED ORAL EVERY MORNING
Qty: 30 TABLET | Refills: 5 | Status: SHIPPED | OUTPATIENT
Start: 2021-10-05 | End: 2022-04-08 | Stop reason: CLARIF

## 2021-11-10 DIAGNOSIS — R42 DIZZINESS: ICD-10-CM

## 2021-11-10 DIAGNOSIS — E11.65 TYPE 2 DIABETES MELLITUS WITH HYPERGLYCEMIA, WITHOUT LONG-TERM CURRENT USE OF INSULIN (HCC): ICD-10-CM

## 2021-11-10 RX ORDER — MECLIZINE HCL 12.5 MG/1
TABLET ORAL
Qty: 90 TABLET | Refills: 1 | Status: SHIPPED | OUTPATIENT
Start: 2021-11-10 | End: 2022-02-26

## 2021-12-20 ENCOUNTER — FOLLOW UP (OUTPATIENT)
Dept: URBAN - METROPOLITAN AREA CLINIC 6 | Facility: CLINIC | Age: 71
End: 2021-12-20

## 2021-12-20 DIAGNOSIS — H40.1122: ICD-10-CM

## 2021-12-20 DIAGNOSIS — H40.1111: ICD-10-CM

## 2021-12-20 PROCEDURE — 1036F TOBACCO NON-USER: CPT

## 2021-12-20 PROCEDURE — 92012 INTRM OPH EXAM EST PATIENT: CPT

## 2021-12-20 PROCEDURE — 92083 EXTENDED VISUAL FIELD XM: CPT

## 2021-12-20 PROCEDURE — G8427 DOCREV CUR MEDS BY ELIG CLIN: HCPCS

## 2021-12-20 ASSESSMENT — TONOMETRY
OS_IOP_MMHG: 25
OD_IOP_MMHG: 24

## 2021-12-20 ASSESSMENT — VISUAL ACUITY
OU_SC: J1+
OS_CC: CF 2FT
OD_PH: 20/40+1
OD_GLARE: 20/70
OD_CC: 20/40
OS_PH: 20/400

## 2022-01-05 DIAGNOSIS — I10 BENIGN ESSENTIAL HYPERTENSION: ICD-10-CM

## 2022-01-12 ENCOUNTER — CLINICAL SUPPORT (OUTPATIENT)
Dept: FAMILY MEDICINE CLINIC | Facility: CLINIC | Age: 72
End: 2022-01-12
Payer: MEDICARE

## 2022-01-12 DIAGNOSIS — Z23 ENCOUNTER FOR IMMUNIZATION: Primary | ICD-10-CM

## 2022-01-12 PROCEDURE — G0008 ADMIN INFLUENZA VIRUS VAC: HCPCS

## 2022-01-12 PROCEDURE — 90662 IIV NO PRSV INCREASED AG IM: CPT

## 2022-01-17 ENCOUNTER — IOP CHECK (OUTPATIENT)
Dept: URBAN - METROPOLITAN AREA CLINIC 6 | Facility: CLINIC | Age: 72
End: 2022-01-17

## 2022-01-17 DIAGNOSIS — H40.1111: ICD-10-CM

## 2022-01-17 DIAGNOSIS — H40.1122: ICD-10-CM

## 2022-01-17 PROCEDURE — 92012 INTRM OPH EXAM EST PATIENT: CPT

## 2022-01-17 ASSESSMENT — TONOMETRY
OD_IOP_MMHG: 19
OS_IOP_MMHG: 17

## 2022-01-17 ASSESSMENT — VISUAL ACUITY
OS_CC: CF 1FT
OD_CC: 20/40-1
OD_PH: 20/40+1
OS_PH: 20/400

## 2022-02-23 DIAGNOSIS — R42 DIZZINESS: ICD-10-CM

## 2022-02-23 RX ORDER — MECLIZINE HCL 12.5 MG/1
TABLET ORAL
Qty: 90 TABLET | Refills: 1 | OUTPATIENT
Start: 2022-02-23

## 2022-02-26 DIAGNOSIS — R42 DIZZINESS: ICD-10-CM

## 2022-02-26 RX ORDER — MECLIZINE HCL 12.5 MG/1
TABLET ORAL
Qty: 90 TABLET | Refills: 0 | Status: SHIPPED | OUTPATIENT
Start: 2022-02-26 | End: 2022-04-05 | Stop reason: SDUPTHER

## 2022-03-03 DIAGNOSIS — I10 BENIGN ESSENTIAL HYPERTENSION: ICD-10-CM

## 2022-03-03 RX ORDER — HYDROCHLOROTHIAZIDE 25 MG/1
25 TABLET ORAL DAILY
Qty: 90 TABLET | Refills: 0 | Status: SHIPPED | OUTPATIENT
Start: 2022-03-03 | End: 2022-06-17

## 2022-03-03 RX ORDER — LOSARTAN POTASSIUM 100 MG/1
100 TABLET ORAL DAILY
Qty: 90 TABLET | Refills: 0 | Status: SHIPPED | OUTPATIENT
Start: 2022-03-03 | End: 2022-08-08 | Stop reason: SDUPTHER

## 2022-03-29 DIAGNOSIS — E78.2 MIXED HYPERLIPIDEMIA: ICD-10-CM

## 2022-03-29 RX ORDER — ATORVASTATIN CALCIUM 20 MG/1
20 TABLET, FILM COATED ORAL DAILY
Qty: 90 TABLET | Refills: 3 | Status: SHIPPED | OUTPATIENT
Start: 2022-03-29 | End: 2022-06-27

## 2022-04-05 ENCOUNTER — APPOINTMENT (OUTPATIENT)
Dept: LAB | Facility: CLINIC | Age: 72
End: 2022-04-05
Payer: MEDICARE

## 2022-04-05 DIAGNOSIS — I10 BENIGN ESSENTIAL HYPERTENSION: ICD-10-CM

## 2022-04-05 DIAGNOSIS — R42 DIZZINESS: ICD-10-CM

## 2022-04-05 DIAGNOSIS — E80.6 HYPERBILIRUBINEMIA: ICD-10-CM

## 2022-04-05 DIAGNOSIS — K76.0 FATTY LIVER: ICD-10-CM

## 2022-04-05 DIAGNOSIS — E78.2 MIXED HYPERLIPIDEMIA: ICD-10-CM

## 2022-04-05 DIAGNOSIS — E11.65 TYPE 2 DIABETES MELLITUS WITH HYPERGLYCEMIA, WITHOUT LONG-TERM CURRENT USE OF INSULIN (HCC): ICD-10-CM

## 2022-04-05 LAB
ALBUMIN SERPL BCP-MCNC: 3.8 G/DL (ref 3.5–5)
ALP SERPL-CCNC: 76 U/L (ref 46–116)
ALT SERPL W P-5'-P-CCNC: 48 U/L (ref 12–78)
ANION GAP SERPL CALCULATED.3IONS-SCNC: 10 MMOL/L (ref 4–13)
AST SERPL W P-5'-P-CCNC: 29 U/L (ref 5–45)
BILIRUB SERPL-MCNC: 0.94 MG/DL (ref 0.2–1)
BUN SERPL-MCNC: 22 MG/DL (ref 5–25)
CALCIUM SERPL-MCNC: 10.1 MG/DL (ref 8.3–10.1)
CHLORIDE SERPL-SCNC: 106 MMOL/L (ref 100–108)
CHOLEST SERPL-MCNC: 137 MG/DL
CO2 SERPL-SCNC: 27 MMOL/L (ref 21–32)
CREAT SERPL-MCNC: 1.42 MG/DL (ref 0.6–1.3)
CREAT UR-MCNC: 148 MG/DL
EST. AVERAGE GLUCOSE BLD GHB EST-MCNC: 217 MG/DL
GFR SERPL CREATININE-BSD FRML MDRD: 48 ML/MIN/1.73SQ M
GLUCOSE P FAST SERPL-MCNC: 257 MG/DL (ref 65–99)
HBA1C MFR BLD: 9.2 %
HDLC SERPL-MCNC: 45 MG/DL
LDLC SERPL CALC-MCNC: 58 MG/DL (ref 0–100)
MICROALBUMIN UR-MCNC: 14.3 MG/L (ref 0–20)
MICROALBUMIN/CREAT 24H UR: 10 MG/G CREATININE (ref 0–30)
NONHDLC SERPL-MCNC: 92 MG/DL
POTASSIUM SERPL-SCNC: 3.6 MMOL/L (ref 3.5–5.3)
PROT SERPL-MCNC: 7.8 G/DL (ref 6.4–8.2)
SODIUM SERPL-SCNC: 143 MMOL/L (ref 136–145)
TRIGL SERPL-MCNC: 170 MG/DL

## 2022-04-05 PROCEDURE — 80061 LIPID PANEL: CPT

## 2022-04-05 PROCEDURE — 83036 HEMOGLOBIN GLYCOSYLATED A1C: CPT

## 2022-04-05 PROCEDURE — 82043 UR ALBUMIN QUANTITATIVE: CPT | Performed by: FAMILY MEDICINE

## 2022-04-05 PROCEDURE — 36415 COLL VENOUS BLD VENIPUNCTURE: CPT

## 2022-04-05 PROCEDURE — 82570 ASSAY OF URINE CREATININE: CPT | Performed by: FAMILY MEDICINE

## 2022-04-05 PROCEDURE — 80053 COMPREHEN METABOLIC PANEL: CPT

## 2022-04-05 RX ORDER — MECLIZINE HCL 12.5 MG/1
TABLET ORAL
Qty: 90 TABLET | Refills: 3 | Status: SHIPPED | OUTPATIENT
Start: 2022-04-05

## 2022-04-07 ENCOUNTER — OFFICE VISIT (OUTPATIENT)
Dept: FAMILY MEDICINE CLINIC | Facility: CLINIC | Age: 72
End: 2022-04-07
Payer: MEDICARE

## 2022-04-07 VITALS
OXYGEN SATURATION: 93 % | RESPIRATION RATE: 20 BRPM | TEMPERATURE: 98.7 F | SYSTOLIC BLOOD PRESSURE: 116 MMHG | HEIGHT: 67 IN | BODY MASS INDEX: 38.27 KG/M2 | HEART RATE: 104 BPM | DIASTOLIC BLOOD PRESSURE: 80 MMHG | WEIGHT: 243.8 LBS

## 2022-04-07 DIAGNOSIS — E80.6 HYPERBILIRUBINEMIA: ICD-10-CM

## 2022-04-07 DIAGNOSIS — E11.42 DIABETIC POLYNEUROPATHY ASSOCIATED WITH TYPE 2 DIABETES MELLITUS (HCC): ICD-10-CM

## 2022-04-07 DIAGNOSIS — E78.2 MIXED HYPERLIPIDEMIA: ICD-10-CM

## 2022-04-07 DIAGNOSIS — Z12.11 SCREENING FOR COLON CANCER: ICD-10-CM

## 2022-04-07 DIAGNOSIS — Z12.5 SCREENING FOR PROSTATE CANCER: ICD-10-CM

## 2022-04-07 DIAGNOSIS — E11.65 TYPE 2 DIABETES MELLITUS WITH HYPERGLYCEMIA, WITHOUT LONG-TERM CURRENT USE OF INSULIN (HCC): ICD-10-CM

## 2022-04-07 DIAGNOSIS — K76.0 FATTY LIVER: ICD-10-CM

## 2022-04-07 DIAGNOSIS — I10 BENIGN ESSENTIAL HYPERTENSION: Primary | ICD-10-CM

## 2022-04-07 DIAGNOSIS — E66.01 MORBID OBESITY DUE TO EXCESS CALORIES (HCC): ICD-10-CM

## 2022-04-07 DIAGNOSIS — Z00.00 MEDICARE ANNUAL WELLNESS VISIT, SUBSEQUENT: ICD-10-CM

## 2022-04-07 PROCEDURE — 1123F ACP DISCUSS/DSCN MKR DOCD: CPT | Performed by: FAMILY MEDICINE

## 2022-04-07 PROCEDURE — G0439 PPPS, SUBSEQ VISIT: HCPCS | Performed by: FAMILY MEDICINE

## 2022-04-07 PROCEDURE — 99215 OFFICE O/P EST HI 40 MIN: CPT | Performed by: FAMILY MEDICINE

## 2022-04-07 NOTE — PROGRESS NOTES
Chief Complaint   Patient presents with   [facility] Wellness Visit     Subsequent and review labs  Health Maintenance   Topic Date Due    DTaP,Tdap,and Td Vaccines (1 - Tdap) Never done    Colorectal Cancer Screening  Never done   [facility] Annual Wellness Visit (AWV)  03/10/2022    BMI: Followup Plan  03/10/2022    COVID-19 Vaccine (3 - Booster for Moderna series) 03/12/2022    Diabetic Foot Exam  10/05/2022    HEMOGLOBIN A1C  10/05/2022    DM Eye Exam  03/29/2023    Fall Risk  04/07/2023    Depression Screening  04/07/2023    BMI: Adult  04/07/2023    Hepatitis C Screening  Completed    Pneumococcal Vaccine: 65+ Years  Completed    Influenza Vaccine  Completed    HIB Vaccine  Aged Out    Hepatitis B Vaccine  Aged Out    IPV Vaccine  Aged Out    Hepatitis A Vaccine  Aged Out    Meningococcal ACWY Vaccine  Aged Out    HPV Vaccine  Aged Out      Assessment and Plan:     Problem List Items Addressed This Visit        Digestive    Fatty liver       Endocrine    Type 2 diabetes mellitus with hyperglycemia ([facility] )    Diabetic neuropathy associated with type 2 diabetes mellitus ([facility] )       Cardiovascular and Mediastinum    Benign essential hypertension - Primary       Other    Mixed hyperlipidemia    Morbid obesity due to excess calories ([facility] )    Medicare annual wellness visit, subsequent    Hyperbilirubinemia      Other Visit Diagnoses     Screening for colon cancer        Screening for prostate cancer            BMI Counseling: Body mass index is 38 76 kg/m²  The BMI is above normal  Nutrition recommendations include decreasing portion sizes, encouraging healthy choices of fruits and vegetables, decreasing fast food intake, consuming healthier snacks, limiting drinks that contain sugar, moderation in carbohydrate intake, increasing intake of lean protein, reducing intake of saturated and trans fat and reducing intake of cholesterol  Exercise recommendations include exercising 3-5 times per week  No pharmacotherapy was ordered  Rationale for BMI follow-up plan is due to patient being overweight or obese  Depression Screening and Follow-up Plan: Patient was screened for depression during today's encounter  They screened negative with a PHQ-2 score of 0  Preventive health issues were discussed with patient, and age appropriate screening tests were ordered as noted in patient's After Visit Summary  Personalized health advice and appropriate referrals for health education or preventive services given if needed, as noted in patient's After Visit Summary       History of Present Illness:     Patient presents for Medicare Annual Wellness visit    Patient Care Team:  Brittany Fong MD as PCP - General     Problem List:     Patient Active Problem List   Diagnosis    Abnormal EKG    Benign essential hypertension    PVC (premature ventricular contraction)    Elevated liver enzymes    Exertional shortness of breath    Fatty liver    Glaucoma    Mixed hyperlipidemia    Morbid obesity due to excess calories ([facility] )    Sinus tachycardia    Type 2 diabetes mellitus with hyperglycemia ([facility] )    Vitamin D deficiency    Hypoxia    Medicare annual wellness visit, subsequent    Hypersensitivity pneumonitis ([facility] )    Diabetic neuropathy associated with type 2 diabetes mellitus ([facility] )    Dizziness/Vertigo    Low TSH level    Chronic hypoxemic respiratory failure (HCC)    Chronic cough    Hyperbilirubinemia      Past Medical and Surgical History:     Past Medical History:   Diagnosis Date    Cataract, left     last assessed [id number]    Epistaxis     last assessed [id number]    Exertional shortness of breath     last assessed [id number]    IFG (impaired fasting glucose)     last assessed [id number]     Past Surgical History:   Procedure Laterality Date    CATARACT EXTRACTION Left 07/01/2016    L eye cataract surgery - 7/16      Family History:     Family History   Problem Relation Age of Onset    Stomach cancer Father     No Known Problems Mother       Social History:     Social History     Socioeconomic History    Marital status: /Civil Union     Spouse name: Not on file    Number of children: Not on file    Years of education: Not on file    Highest education level: Not on file   Occupational History    Not on file   Tobacco Use    Smoking status: Never Smoker    Smokeless tobacco: Never Used   Substance and Sexual Activity    Alcohol use: No    Drug use: Never    Sexual activity: Not Currently   Other Topics Concern    Not on file   Social History Narrative    Not on file     Social Determinants of Health     Financial Resource Strain: Not on file   Food Insecurity: Not on file   Transportation Needs: Not on file   Physical Activity: Not on file   Stress: Not on file   Social Connections: Not on file   Intimate Partner Violence: Not on file   Housing Stability: Not on file      Medications and Allergies:     Current Outpatient Medications   Medication Sig Dispense Refill    amLODIPine (NORVASC) 5 mg tablet Take 1 tablet (5 mg total) by mouth daily 90 tablet 2    aspirin 325 mg tablet Take 1 tablet by mouth daily       atorvastatin (LIPITOR) 20 mg tablet Take 1 tablet (20 mg total) by mouth daily 90 tablet 3    cholecalciferol (VITAMIN D3) 1,000 units tablet Take 1 tablet by mouth daily      Empagliflozin (Jardiance) 10 MG TABS Take 1 tablet (10 mg total) by mouth every morning 30 tablet 5    glucose blood (ONE TOUCH ULTRA TEST) test strip Test sugar 2-3 times daily 300 each 3    glucose blood test strip Use 1 each daily 100 each 3    hydrochlorothiazide (HYDRODIURIL) 25 mg tablet Take 1 tablet (25 mg total) by mouth daily 90 tablet 0    latanoprost (XALATAN) 0 005 % ophthalmic solution Apply to eye      PARADIGM ENERGY GROUPCAN UNISTIK II LANCETS MISC by Does not apply route 3 (three) times a day for 90 days 300 each 3    losartan (COZAAR) 100 MG tablet Take 1 tablet (100 mg total) by mouth daily 90 tablet 0    meclizine (ANTIVERT) 12 5 MG tablet TAKE 1 TABLET BY MOUTH EVERY 6 TO 8 HOURS AS NEEDED FOR DIZZINESS 90 tablet 3    metFORMIN (GLUCOPHAGE) 1000 MG tablet Take 1 tablet (1,000 mg total) by mouth 2 (two) times a day 180 tablet 3    metoprolol tartrate (LOPRESSOR) 25 mg tablet Take 0 5 tablets (12 5 mg total) by mouth 2 (two) times a day 90 tablet 1    Multiple Vitamins-Minerals (MULTIVITAL-M) TABS Take 1 tablet by mouth daily      Omega-3 Fatty Acids (FISH OIL) 1,000 mg Take by mouth 2 (two) times a day      vitamin E, tocopherol, 400 units capsule Take by mouth daily       No current facility-administered medications for this visit  No Known Allergies   Immunizations:     Immunization History   Administered Date(s) Administered    COVID-19 MODERNA VACC 0 5 ML IM 09/14/2021, 10/12/2021    Influenza Split High Dose Preservative Free IM 12/04/2015, 01/16/2017, 01/16/2018    Influenza, high dose seasonal 0 7 mL 10/23/2018, 10/17/2019, 11/09/2020, 01/12/2022    Pneumococcal Conjugate 13-Valent 12/04/2015    Pneumococcal Polysaccharide PPV23 10/31/2019      Health Maintenance:         Topic Date Due    Colorectal Cancer Screening  Never done    Hepatitis C Screening  Completed         Topic Date Due    DTaP,Tdap,and Td Vaccines (1 - Tdap) Never done    COVID-19 Vaccine (3 - Booster for Moderna series) 03/12/2022      Medicare Health Risk Assessment:     /80 (BP Location: Left arm, Patient Position: Sitting, Cuff Size: Large)   Pulse 104   Temp 98 7 °F (37 1 °C) (Tympanic)   Resp 20   Ht 5' 6 5" (1 689 m)   Wt 111 kg (243 lb 12 8 oz)   SpO2 93%   BMI 38 76 kg/m²      Louann Whitlock is here for his Subsequent Wellness visit  Health Risk Assessment:   Patient rates overall health as good  Patient feels that their physical health rating is same  Patient is satisfied with their life  Eyesight was rated as slightly worse  Hearing was rated as same   Patient feels that their emotional and mental health rating is same  Patients states they are never, rarely angry  Patient states they are sometimes unusually tired/fatigued  Pain experienced in the last 7 days has been none  Patient states that he has experienced no weight loss or gain in last 6 months  Depression Screening:   PHQ-2 Score: 0      Fall Risk Screening: In the past year, patient has experienced: no history of falling in past year      Home Safety:  Patient does not have trouble with stairs inside or outside of their home  Patient has working smoke alarms and has no working carbon monoxide detector  Home safety hazards include: loose rugs on the floor  Nutrition:   Current diet is Unhealthy and Limited junk food  Medications:   Patient is currently taking over-the-counter supplements  OTC medications include: see medication list  Patient is able to manage medications  Activities of Daily Living (ADLs)/Instrumental Activities of Daily Living (IADLs):   Walk and transfer into and out of bed and chair?: Yes  Dress and groom yourself?: Yes    Bathe or shower yourself?: Yes    Feed yourself? Yes  Do your laundry/housekeeping?: Yes  Manage your money, pay your bills and track your expenses?: Yes  Make your own meals?: Yes    Do your own shopping?: Yes    Previous Hospitalizations:   Any hospitalizations or ED visits within the last 12 months?: No      Advance Care Planning:   Living will: Yes    Durable POA for healthcare:  Yes    Advanced directive: Yes    Advanced directive counseling given: Yes    Five wishes given: Yes      Cognitive Screening:   Provider or family/friend/caregiver concerned regarding cognition?: No    PREVENTIVE SCREENINGS      Cardiovascular Screening:    General: Screening Not Indicated and History Lipid Disorder      Diabetes Screening:     General: Screening Not Indicated and History Diabetes      Colorectal Cancer Screening:     General: Risks and Benefits Discussed    Due for: Cologuard      Osteoporosis Screening:    General: Risks and Benefits Discussed      Abdominal Aortic Aneurysm (AAA) Screening:    Risk factors include: age between 73-69 yo        General: Screening Not Indicated      Lung Cancer Screening:     General: Screening Not Indicated      Hepatitis C Screening:    General: Screening Current    Screening, Brief Intervention, and Referral to Treatment (SBIRT)    Screening  Typical number of drinks in a day: 0  Typical number of drinks in a week: 0  Interpretation: Low risk drinking behavior  Single Item Drug Screening:  How often have you used an illegal drug (including marijuana) or a prescription medication for non-medical reasons in the past year? never    Single Item Drug Screen Score: 0  Interpretation: Negative screen for possible drug use disorder    Other Counseling Topics:   Car/seat belt/driving safety, skin self-exam and calcium and vitamin D intake and regular weightbearing exercise         Umm Carey MD

## 2022-04-07 NOTE — PROGRESS NOTES
Assessment/Plan:    Benign essential hypertension  BP is well controlled  Continue Metoprolol 25 mg-1/2 tablet twice daily, HCTZ 25 mg daily  Decrease dose of Losartan 100 mg to -1/2 tablet daily due to elevated creatinine level  Encouraged to stay well hydrated  Recheck BMP in 1 week  Type 2 diabetes mellitus with hyperglycemia, without long-term current use of insulin ([place] [address] )    Lab Results   Component Value Date    HGBA1C 9 2 (H) 04/05/2022     Worsening blood sugar control since last visit in October 2021  Discussed dietary modifications with patient  Encouraged to lose weight  Continue Metformin 1000 mg twice daily with meals  Start Jardiance 10 mg daily  Monitor blood sugar at home twice daily and call with blood sugar log in 2 weeks  Check eye exam by ophthalmologist Dr Fiona Senior annually  Schedule appointment with podiatry Dr Kendrick Fermin  Diabetic neuropathy associated with type 2 diabetes mellitus ([place] [address] )    Lab Results   Component Value Date    HGBA1C 9 2 (H) 04/05/2022     Stressed the importance of keeping blood sugar under control to prevent worsening neuropathy  Schedule visit with podiatry Dr Kendrick Fermin  Mixed hyperlipidemia  LDL 58- at goal   Goal for triglycerides < 150  Continue Atorvastatin 20 mg daily  Restart Omega 3 1000 mg 1 capsule daily  Follow a low-cholesterol, low-fat diet, regular exercise  Morbid obesity due to excess calories [facility])  Patient lost 6 lb since last visit in October 2021  Recommended to continue working on weight reduction  Fatty liver  LFT's - within normal range  Continue Vit E 400 IU daily  Follow a low-fat diet, lose weight  HM:  patient refusing colonoscopy  Will order Cologuard test     Check PSA level for prostate cancer screening  Schedule Covid booster  RTO for follow-up in 4 months  Check labs prior to next visit       Diagnoses and all orders for this visit:    Benign essential hypertension  -     Basic metabolic panel; Future    Type 2 diabetes mellitus with hyperglycemia, without long-term current use of insulin ([facility] )    Diabetic polyneuropathy associated with type 2 diabetes mellitus ([facility] )    Mixed hyperlipidemia    Morbid obesity due to excess calories ([facility] )    Hyperbilirubinemia    Fatty liver    Medicare annual wellness visit, subsequent    Screening for colon cancer  -     Cologuard    Screening for prostate cancer  -     PSA, Total Screen; Future          Subjective:      Patient ID: Ever Scott is a 74 y o  male  HPI     Patient presents for 6 month follow-up of chronic medical conditions  PMHx: HTN, Type 2 DM with diabetic neuropathy, Hyperlipidemia, Morbid obesity, fatty liver  Reviewed current medications, blood test results from 4/5/22  Hb A1C 9 2 ( previously 7 5 in September 2021)  Fasting blood sugar 257, creatinine 1 42,  potassium 3 6  AST, ALT- within normal range  Cholesterol 137, triglycerides 170, HDL 45, LDL 58  HTN - patient denies chest pain, heart palpitations  He takes Losartan 100 mg -1/2 tablet twice daily  Patient has history of dizziness/ vertigo  Takes Meclizine 12 5 mg twice daily  He has stable exertional shortness of breath  Nuclear stress test done in April 2018 showed no evidence of cardiac ischemia  Type 2 DM- worsening blood sugar control since last visit in October 2021  Patient states that he eats cakes, starchy foods  Apparently he did not start Jardiance 10 mg daily as recommended at the last visi  He is not sure if Rx was not covered by his insurance  Currently taking Metformin 1000 mg twice daily  Eye exams per ophthalmology Dr Bianca Nguyen  C/o numbness in feet  He did not schedule appointment with podiatrist yet  Hyperlipidemia- currently taking Atorvastatin 20 mg daily  Stopped taking [place] 3  Refusing colonoscopy, Cologuard testing      The following portions of the patient's history were reviewed and updated as appropriate: allergies, current medications, past family history, past social history, past surgical history and problem list     Review of Systems   Constitutional: Positive for fatigue (mild)  Negative for activity change, appetite change, chills and fever  HENT: Negative for congestion, ear pain, hearing loss, nosebleeds, sore throat and trouble swallowing  Eyes: Negative  Respiratory: Positive for shortness of breath (stable exertional SOB)  Negative for cough, chest tightness and wheezing  Cardiovascular: Negative for chest pain, palpitations and leg swelling  Gastrointestinal: Negative for abdominal pain, blood in stool, constipation, diarrhea, nausea and vomiting  Genitourinary: Negative for difficulty urinating, dysuria, flank pain and hematuria  Nocturia x 1   Musculoskeletal: Negative for arthralgias, back pain, gait problem and joint swelling  Skin: Negative for rash  Neurological: Positive for numbness (in feet)  Negative for dizziness, syncope and headaches  Hematological: Negative  Psychiatric/Behavioral: Negative for dysphoric mood and sleep disturbance  The patient is not nervous/anxious  Objective:      /80 (BP Location: Left arm, Patient Position: Sitting, Cuff Size: Large)   Pulse 104   Temp 98 7 °F (37 1 °C) (Tympanic)   Resp 20   Ht 5' 6 5" (1 689 m)   Wt 111 kg (243 lb 12 8 oz)   SpO2 93%   BMI 38 76 kg/m²          Physical Exam  Vitals and nursing note reviewed  Constitutional:       Appearance: Normal appearance  He is obese  HENT:      Head: Normocephalic and atraumatic  Eyes:      Pupils: Pupils are equal, round, and reactive to light  Neck:      Vascular: No carotid bruit  Cardiovascular:      Rate and Rhythm: Regular rhythm  Tachycardia present  Pulses: Pulses are weak  Dorsalis pedis pulses are 1+ on the right side and 1+ on the left side  Heart sounds: No murmur heard        Pulmonary: Effort: Pulmonary effort is normal       Breath sounds: Normal breath sounds  Abdominal:      General: Bowel sounds are normal  There is no distension  Palpations: Abdomen is soft  Tenderness: There is no abdominal tenderness  Musculoskeletal:         General: No swelling or tenderness  Normal range of motion  Cervical back: Normal range of motion and neck supple  Right lower leg: No edema  Left lower leg: No edema  Feet:      Right foot:      Skin integrity: No ulcer, skin breakdown, erythema, warmth, callus or dry skin  Left foot:      Skin integrity: No ulcer, skin breakdown, erythema, warmth, callus or dry skin  Skin:     General: Skin is warm and dry  Findings: No rash  Neurological:      General: No focal deficit present  Mental Status: He is alert and oriented to person, place, and time  Motor: No weakness  Gait: Gait normal    Psychiatric:         Mood and Affect: Mood normal            Right Foot/Ankle   Right Foot Inspection  Skin Exam: skin normal and skin intact  No dry skin, no warmth, no callus, no erythema, no maceration, no abnormal color, no pre-ulcer, no ulcer and no callus  Toe Exam: No swelling, no tenderness, erythema and  no right toe deformity    Sensory   Monofilament testing: intact    Vascular  The right DP pulse is 1+  Left Foot/Ankle  Left Foot Inspection  Skin Exam: skin normal and skin intact  No dry skin, no warmth, no erythema, no maceration, normal color, no pre-ulcer, no ulcer and no callus  Toe Exam: No swelling, no tenderness, no erythema and no left toe deformity  Sensory   Monofilament testing: intact    Vascular  The left DP pulse is 1+       Assign Risk Category  No deformity present  No loss of protective sensation  Weak pulses  Risk: 2

## 2022-04-08 DIAGNOSIS — E11.65 TYPE 2 DIABETES MELLITUS WITH HYPERGLYCEMIA, WITHOUT LONG-TERM CURRENT USE OF INSULIN (HCC): Primary | ICD-10-CM

## 2022-04-08 RX ORDER — GLIMEPIRIDE 2 MG/1
TABLET ORAL
Qty: 60 TABLET | Refills: 5 | Status: SHIPPED | OUTPATIENT
Start: 2022-04-08 | End: 2022-06-15 | Stop reason: SDUPTHER

## 2022-04-08 NOTE — ASSESSMENT & PLAN NOTE
LDL 58- at goal   Goal for triglycerides < 150  Continue Atorvastatin 20 mg daily  Restart Omega 3 1000 mg 1 capsule daily  Follow a low-cholesterol, low-fat diet, regular exercise

## 2022-04-08 NOTE — ASSESSMENT & PLAN NOTE
Lab Results   Component Value Date    HGBA1C 9 2 (H) 04/05/2022     Worsening blood sugar control since last visit in October 2021  Discussed dietary modifications with patient  Encouraged to lose weight  Continue Metformin 1000 mg twice daily with meals  Start Jardiance 10 mg daily  Monitor blood sugar at home twice daily and call with blood sugar log in 2 weeks  Check eye exam by ophthalmologist Dr Rony Murray annually  Schedule appointment with podiatry Dr Herb Reza

## 2022-04-08 NOTE — ASSESSMENT & PLAN NOTE
BP is well controlled  Continue Metoprolol 25 mg-1/2 tablet twice daily, HCTZ 25 mg daily  Decrease dose of Losartan 100 mg to -1/2 tablet daily due to elevated creatinine level  Encouraged to stay well hydrated  Recheck BMP in 1 week

## 2022-04-08 NOTE — ASSESSMENT & PLAN NOTE
Patient lost 6 lb since last visit in October 2021  Recommended to continue working on weight reduction

## 2022-04-08 NOTE — PROGRESS NOTES
I called patient to check if he got Rx filled for Jardiance 10 mg  Patient states that he needs to pay out of pocket $ 400 per month and cannot afford it  Refusing Insulin therapy  Rx sent to pharmacy for Glimepiride 2 mg to take 1 tablet twice daily with meals

## 2022-04-08 NOTE — ASSESSMENT & PLAN NOTE
Lab Results   Component Value Date    HGBA1C 9 2 (H) 04/05/2022     Stressed the importance of keeping blood sugar under control to prevent worsening neuropathy  Schedule visit with podiatry Dr Ja Garvin

## 2022-04-22 ENCOUNTER — APPOINTMENT (OUTPATIENT)
Dept: LAB | Facility: CLINIC | Age: 72
End: 2022-04-22
Payer: MEDICARE

## 2022-04-22 DIAGNOSIS — E11.65 TYPE 2 DIABETES MELLITUS WITH HYPERGLYCEMIA, WITHOUT LONG-TERM CURRENT USE OF INSULIN (HCC): Primary | ICD-10-CM

## 2022-04-22 DIAGNOSIS — I10 BENIGN ESSENTIAL HYPERTENSION: ICD-10-CM

## 2022-04-22 LAB
ANION GAP SERPL CALCULATED.3IONS-SCNC: 6 MMOL/L (ref 4–13)
BUN SERPL-MCNC: 21 MG/DL (ref 5–25)
CALCIUM SERPL-MCNC: 9.8 MG/DL (ref 8.3–10.1)
CHLORIDE SERPL-SCNC: 103 MMOL/L (ref 100–108)
CO2 SERPL-SCNC: 27 MMOL/L (ref 21–32)
CREAT SERPL-MCNC: 1.3 MG/DL (ref 0.6–1.3)
GFR SERPL CREATININE-BSD FRML MDRD: 53 ML/MIN/1.73SQ M
GLUCOSE P FAST SERPL-MCNC: 140 MG/DL (ref 65–99)
POTASSIUM SERPL-SCNC: 4.1 MMOL/L (ref 3.5–5.3)
SODIUM SERPL-SCNC: 136 MMOL/L (ref 136–145)

## 2022-04-22 PROCEDURE — 80048 BASIC METABOLIC PNL TOTAL CA: CPT

## 2022-04-22 PROCEDURE — 36415 COLL VENOUS BLD VENIPUNCTURE: CPT

## 2022-05-23 ENCOUNTER — ESTABLISHED COMPREHENSIVE EXAM (OUTPATIENT)
Dept: URBAN - METROPOLITAN AREA CLINIC 6 | Facility: CLINIC | Age: 72
End: 2022-05-23

## 2022-05-23 DIAGNOSIS — Z96.1: ICD-10-CM

## 2022-05-23 DIAGNOSIS — H25.811: ICD-10-CM

## 2022-05-23 DIAGNOSIS — H40.1111: ICD-10-CM

## 2022-05-23 DIAGNOSIS — H40.1122: ICD-10-CM

## 2022-05-23 DIAGNOSIS — E11.9: ICD-10-CM

## 2022-05-23 PROCEDURE — 92202 OPSCPY EXTND ON/MAC DRAW: CPT

## 2022-05-23 PROCEDURE — 92133 CPTRZD OPH DX IMG PST SGM ON: CPT

## 2022-05-23 PROCEDURE — 92014 COMPRE OPH EXAM EST PT 1/>: CPT

## 2022-05-23 ASSESSMENT — VISUAL ACUITY
OU_CC: J3
OD_CC: 20/40
OS_CC: 20/400

## 2022-05-23 ASSESSMENT — TONOMETRY
OS_IOP_MMHG: 17
OD_IOP_MMHG: 16

## 2022-05-28 NOTE — PATIENT INSTRUCTIONS
10% - bad control"> 10% - bad control,Hemoglobin A1c (HbA1c) greater than 10% indicating poor diabetic control,Haemoglobin A1c greater than 10% indicating poor diabetic control">   Diabetes Mellitus Type 2 in Adults, Ambulatory Care   GENERAL INFORMATION:   Diabetes mellitus type 2  is a disease that affects how your body uses glucose (sugar)  Insulin helps move sugar out of the blood so it can be used for energy  Normally, when the blood sugar level increases, the pancreas makes more insulin  Type 2 diabetes develops because either the body cannot make enough insulin, or it cannot use the insulin correctly  After many years, your pancreas may stop making insulin  Common symptoms include the following:   · More hunger or thirst than usual     · Frequent urination     · Weight loss without trying     · Blurred vision  Seek immediate care for the following symptoms:   · Severe abdominal pain, or pain that spreads to your back  You may also be vomiting  · Trouble staying awake or focusing    · Shaking or sweating    · Blurred or double vision    · Breath has a fruity, sweet smell    · Breathing is deep and labored, or rapid and shallow    · Heartbeat is fast and weak  Treatment for diabetes mellitus type 2  includes keeping your blood sugar at a normal level  You must eat the right foods, and exercise regularly  You may also need medicine if you cannot control your blood sugar level with nutrition and exercise  Manage diabetes mellitus type 2:   · Check your blood sugar level  You will be taught how to check a small drop of blood in a glucose monitor  Ask your healthcare provider when and how often to check during the day  Ask your healthcare provider what your blood sugar levels should be when you check them  · Keep track of carbohydrates (sugar and starchy foods)  Your blood sugar level can get too high if you eat too many carbohydrates   Your dietitian will help you plan meals and snacks that have the right amount of carbohydrates  · Eat low-fat foods  Some examples are skinless chicken and low-fat milk  · Eat less sodium (salt)  Some examples of high-sodium foods to limit are soy sauce, potato chips, and soup  Do not add salt to food you cook  Limit your use of table salt  · Eat high-fiber foods  Foods that are a good source of fiber include vegetables, whole grain bread, and beans  · Limit alcohol  Alcohol affects your blood sugar level and can make it harder to manage your diabetes  Women should limit alcohol to 1 drink a day  Men should limit alcohol to 2 drinks a day  A drink of alcohol is 12 ounces of beer, 5 ounces of wine, or 1½ ounces of liquor  · Get regular exercise  Exercise can help keep your blood sugar level steady, decrease your risk of heart disease, and help you lose weight  Exercise for at least 30 minutes, 5 days a week  Include muscle strengthening activities 2 days each week  Work with your healthcare provider to create an exercise plan  · Check your feet each day  for injuries or open sores  Ask your healthcare provider for activities you can do if you have an open sore  · Quit smoking  If you smoke, it is never too late to quit  Smoking can worsen the problems that may occur with diabetes  Ask your healthcare provider for information about how to stop smoking if you are having trouble quitting  · Ask about your weight:  Ask healthcare providers if you need to lose weight, and how much to lose  Ask them to help you with a weight loss program  Even a 10 to 15 pound weight loss can help you manage your blood sugar level  · Carry medical alert identification  Wear medical alert jewelry or carry a card that says you have diabetes  Ask your healthcare provider where to get these items  · Ask about vaccines  Diabetes puts you at risk of serious illness if you get the flu, pneumonia, or hepatitis   Ask your healthcare provider if you should get a flu, pneumonia, or hepatitis B vaccine, and when to get the vaccine  Follow up with your healthcare provider as directed:  Write down your questions so you remember to ask them during your visits  CARE AGREEMENT:   You have the right to help plan your care  Learn about your health condition and how it may be treated  Discuss treatment options with your caregivers to decide what care you want to receive  You always have the right to refuse treatment  The above information is an  only  It is not intended as medical advice for individual conditions or treatments  Talk to your doctor, nurse or pharmacist before following any medical regimen to see if it is safe and effective for you  © 2014 [address] is for End User's use only and may not be sold, redistributed or otherwise used for commercial purposes  All illustrations and images included in CareNotes® are the copyrighted property of A D A M [employer]  or [employer]

## 2022-06-06 DIAGNOSIS — I10 ESSENTIAL HYPERTENSION: ICD-10-CM

## 2022-06-06 RX ORDER — AMLODIPINE BESYLATE 5 MG/1
5 TABLET ORAL DAILY
Qty: 90 TABLET | Refills: 2 | Status: SHIPPED | OUTPATIENT
Start: 2022-06-06 | End: 2022-09-04

## 2022-06-15 DIAGNOSIS — E11.65 TYPE 2 DIABETES MELLITUS WITH HYPERGLYCEMIA, WITHOUT LONG-TERM CURRENT USE OF INSULIN (HCC): ICD-10-CM

## 2022-06-15 RX ORDER — GLIMEPIRIDE 2 MG/1
TABLET ORAL
Qty: 60 TABLET | Refills: 5 | Status: SHIPPED | OUTPATIENT
Start: 2022-06-15

## 2022-06-17 DIAGNOSIS — I10 BENIGN ESSENTIAL HYPERTENSION: ICD-10-CM

## 2022-06-17 RX ORDER — HYDROCHLOROTHIAZIDE 25 MG/1
25 TABLET ORAL DAILY
Qty: 90 TABLET | Refills: 0 | Status: SHIPPED | OUTPATIENT
Start: 2022-06-17 | End: 2022-09-15

## 2022-07-18 DIAGNOSIS — I10 BENIGN ESSENTIAL HYPERTENSION: ICD-10-CM

## 2022-08-04 ENCOUNTER — RA CDI HCC (OUTPATIENT)
Dept: OTHER | Facility: HOSPITAL | Age: 72
End: 2022-08-04

## 2022-08-04 NOTE — PROGRESS NOTES
[address]  coding opportunities     E11 22     Chart Reviewed number of suggestions sent to Provider: 1     Patients Insurance     Medicare Insurance: [employer]

## 2022-08-05 ENCOUNTER — APPOINTMENT (OUTPATIENT)
Dept: LAB | Facility: CLINIC | Age: 72
End: 2022-08-05
Payer: MEDICARE

## 2022-08-05 DIAGNOSIS — I10 BENIGN ESSENTIAL HYPERTENSION: ICD-10-CM

## 2022-08-05 DIAGNOSIS — E11.65 TYPE 2 DIABETES MELLITUS WITH HYPERGLYCEMIA, WITHOUT LONG-TERM CURRENT USE OF INSULIN (HCC): ICD-10-CM

## 2022-08-05 DIAGNOSIS — Z12.5 SCREENING FOR PROSTATE CANCER: ICD-10-CM

## 2022-08-05 LAB
ALBUMIN SERPL BCP-MCNC: 3.4 G/DL (ref 3.5–5)
ALP SERPL-CCNC: 69 U/L (ref 46–116)
ALT SERPL W P-5'-P-CCNC: 35 U/L (ref 12–78)
ANION GAP SERPL CALCULATED.3IONS-SCNC: 9 MMOL/L (ref 4–13)
AST SERPL W P-5'-P-CCNC: 27 U/L (ref 5–45)
BILIRUB SERPL-MCNC: 1.11 MG/DL (ref 0.2–1)
BUN SERPL-MCNC: 17 MG/DL (ref 5–25)
CALCIUM ALBUM COR SERPL-MCNC: 9.6 MG/DL (ref 8.3–10.1)
CALCIUM SERPL-MCNC: 9.1 MG/DL (ref 8.3–10.1)
CHLORIDE SERPL-SCNC: 105 MMOL/L (ref 96–108)
CO2 SERPL-SCNC: 27 MMOL/L (ref 21–32)
CREAT SERPL-MCNC: 1.32 MG/DL (ref 0.6–1.3)
EST. AVERAGE GLUCOSE BLD GHB EST-MCNC: 146 MG/DL
GFR SERPL CREATININE-BSD FRML MDRD: 52 ML/MIN/1.73SQ M
GLUCOSE P FAST SERPL-MCNC: 153 MG/DL (ref 65–99)
HBA1C MFR BLD: 6.7 %
POTASSIUM SERPL-SCNC: 3.6 MMOL/L (ref 3.5–5.3)
PROT SERPL-MCNC: 7.7 G/DL (ref 6.4–8.4)
PSA SERPL-MCNC: 1.1 NG/ML (ref 0–4)
SODIUM SERPL-SCNC: 141 MMOL/L (ref 135–147)

## 2022-08-05 PROCEDURE — 36415 COLL VENOUS BLD VENIPUNCTURE: CPT

## 2022-08-05 PROCEDURE — 80053 COMPREHEN METABOLIC PANEL: CPT

## 2022-08-05 PROCEDURE — 83036 HEMOGLOBIN GLYCOSYLATED A1C: CPT

## 2022-08-05 PROCEDURE — G0103 PSA SCREENING: HCPCS

## 2022-08-08 DIAGNOSIS — I10 BENIGN ESSENTIAL HYPERTENSION: ICD-10-CM

## 2022-08-10 ENCOUNTER — OFFICE VISIT (OUTPATIENT)
Dept: FAMILY MEDICINE CLINIC | Facility: CLINIC | Age: 72
End: 2022-08-10
Payer: MEDICARE

## 2022-08-10 VITALS
SYSTOLIC BLOOD PRESSURE: 124 MMHG | DIASTOLIC BLOOD PRESSURE: 70 MMHG | BODY MASS INDEX: 39.68 KG/M2 | HEIGHT: 67 IN | WEIGHT: 252.8 LBS | RESPIRATION RATE: 16 BRPM | TEMPERATURE: 98.9 F | HEART RATE: 92 BPM | OXYGEN SATURATION: 95 %

## 2022-08-10 DIAGNOSIS — E78.2 MIXED HYPERLIPIDEMIA: ICD-10-CM

## 2022-08-10 DIAGNOSIS — K76.0 FATTY LIVER: ICD-10-CM

## 2022-08-10 DIAGNOSIS — E11.42 DIABETIC POLYNEUROPATHY ASSOCIATED WITH TYPE 2 DIABETES MELLITUS (HCC): ICD-10-CM

## 2022-08-10 DIAGNOSIS — E11.65 TYPE 2 DIABETES MELLITUS WITH HYPERGLYCEMIA, WITHOUT LONG-TERM CURRENT USE OF INSULIN (HCC): ICD-10-CM

## 2022-08-10 DIAGNOSIS — N18.31 STAGE 3A CHRONIC KIDNEY DISEASE (HCC): ICD-10-CM

## 2022-08-10 DIAGNOSIS — E66.01 MORBID OBESITY DUE TO EXCESS CALORIES (HCC): ICD-10-CM

## 2022-08-10 DIAGNOSIS — I10 BENIGN ESSENTIAL HYPERTENSION: Primary | ICD-10-CM

## 2022-08-10 PROCEDURE — 99214 OFFICE O/P EST MOD 30 MIN: CPT | Performed by: FAMILY MEDICINE

## 2022-08-10 RX ORDER — LOSARTAN POTASSIUM 100 MG/1
TABLET ORAL
Qty: 45 TABLET | Refills: 3 | Status: SHIPPED | OUTPATIENT
Start: 2022-08-10

## 2022-08-10 NOTE — ASSESSMENT & PLAN NOTE
Continue Atorvastatin 20 mg daily  Follow a low-cholesterol, low-fat diet, regular exercise  Check lipid panel before next visit

## 2022-08-10 NOTE — ASSESSMENT & PLAN NOTE
Lab Results   Component Value Date    HGBA1C 6 7 (H) 08/05/2022     Schedule appointment with podiatry for routine foot care

## 2022-08-10 NOTE — ASSESSMENT & PLAN NOTE
Blood pressure remains stable  atient has bilateral leg edema, feet are swollen  Recommended to stop Amlodipine 5 mg daily  Encouraged to follow a low-sodium diet  Keep legs elevated throughout the day  Continue Losartan 100 mg -1/2 tablet daily, Metoprolol 25 mg -1/2 tablet twice daily, HCTZ 25 mg daily  Schedule nurse visit for blood pressure check in 2 -3 weeks

## 2022-08-10 NOTE — PROGRESS NOTES
Chief Complaint   Patient presents with    Follow-up     5 month      Health Maintenance   Topic Date Due    Colorectal Cancer Screening  Never done    COVID-19 Vaccine (3 - Booster for Moderna series) 03/12/2022    Influenza Vaccine (1) 09/01/2022    Diabetic Foot Exam  10/05/2022    HEMOGLOBIN A1C  02/05/2023    DM Eye Exam  03/29/2023    Fall Risk  04/07/2023    Medicare Annual Wellness Visit (AWV)  04/07/2023    BMI: Followup Plan  04/07/2023    Depression Screening  08/10/2023    BMI: Adult  08/10/2023    Hepatitis C Screening  Completed    Pneumococcal Vaccine: 65+ Years  Completed    HIB Vaccine  Aged Out    Hepatitis B Vaccine  Aged Out    IPV Vaccine  Aged Out    Hepatitis A Vaccine  Aged Out    Meningococcal ACWY Vaccine  Aged Out    HPV Vaccine  Aged Out       Depression Screening and Follow-up Plan: Patient was screened for depression during today's encounter  They screened negative with a PHQ-2 score of 0  Assessment/Plan:    Benign essential hypertension  Blood pressure remains stable  atient has bilateral leg edema, feet are swollen  Recommended to stop Amlodipine 5 mg daily  Encouraged to follow a low-sodium diet  Keep legs elevated throughout the day  Continue Losartan 100 mg -1/2 tablet daily, Metoprolol 25 mg -1/2 tablet twice daily, HCTZ 25 mg daily  Schedule nurse visit for blood pressure check in 2 -3 weeks  Type 2 diabetes mellitus with hyperglycemia, without long-term current use of insulin ([facility])    Lab Results   Component Value Date    HGBA1C 6 7 (H) 08/05/2022     Hemoglobin A1C at goal   Patient's insurance did not cover Jardiance 10 mg daily  Continue Metformin, Glimepiride  Monitor blood sugar at home twice daily 3 times per week  Check eye exam by ophthalmology Dr Simi Weiner annually  Schedule appointment with podiatry Dr Celestine Chambers      Diabetic neuropathy associated with type 2 diabetes mellitus ([facility] )    Lab Results   Component Value Date    HGBA1C 6 7 (H) 08/05/2022     Schedule appointment with podiatry for routine foot care  Mixed hyperlipidemia  Continue Atorvastatin 20 mg daily  Follow a low-cholesterol, low-fat diet, regular exercise  Check lipid panel before next visit  Morbid obesity due to excess calories ([facility] [address] )  Encouraged patient to work on weight reduction  Fatty liver  LFT's are normal     Continue Vit E 400 IU daily  Follow a low-fat diet, lose weight  Stage 3a chronic kidney disease [facility])  Lab Results   Component Value Date    EGFR 52 08/05/2022    EGFR 53 04/22/2022    EGFR 48 04/05/2022    CREATININE 1 32 (H) 08/05/2022    CREATININE 1 30 04/22/2022    CREATININE 1 42 (H) 04/05/2022     Recommended to stay well hydrated  Avoid NSAID's, nephrotoxins  Will continue to monitor labs  HM: recommended to schedule COVID booster in the fall  Refusing colonoscopy  Submit Cologuard kit  Schedule follow-up office visit in 4 months  Check labs prior to next visit  Diagnoses and all orders for this visit:    Benign essential hypertension  -     Comprehensive metabolic panel; Future  -     CBC and differential; Future    Type 2 diabetes mellitus with hyperglycemia, without long-term current use of insulin (HCC)  -     Comprehensive metabolic panel; Future    Diabetic polyneuropathy associated with type 2 diabetes mellitus ([facility] )    Mixed hyperlipidemia  -     Comprehensive metabolic panel; Future  -     Lipid panel; Future    Morbid obesity due to excess calories ([facility] )    Fatty liver  -     Comprehensive metabolic panel; Future    Stage 3a chronic kidney disease (HCC)  -     Comprehensive metabolic panel; Future  -     CBC and differential; Future          Subjective:      Patient ID: Gilberto Alcaraz is a 74 y o  male  HPI     Patient presents for 4 month follow-up office visit  Last seen in April 2022      PMHx: HTN, Type 2 DM with diabetic neuropathy, Hyperlipidemia, Morbid obesity, fatty liver     Reviewed current medications, blood test results from August 5, 2022  Hb A1C 6 7 ( improved from 9 2 in April 2022)  Fasting blood sugar 153, potassium 3 6, creatinine 1 32, GFR 52  LFTs- within normal range  PSA 1 1  HTN - blood pressure remains stable  Patient takes Losartan 100 mg -1/2 tablet daily, Amlodipine 5 mg daily, Metoprolol 25 mg -1/2 tablet twice daily, HCTZ 25 mg daily  Denies chest pain, dizziness  He has mild stable exertional shortness of breath  Legs are swollen  Admits eating some salty foods  Type 2 DM  - Hb A1C improved since April 2022  Patient's insurance did not cover Jardiance 10 mg daily  Patient takes Metformin 1000 mg twice daily, Glimepiride 2 mg twice daily with meals  Reports no hypoglycemia  Eye exam done by ophthalmology Dr Ankur Neri  C/o some numbness in feet  Patient plans to schedule appointment with podiatrist Dr Mckee  Hyperlipidemia - on Atorvastatin 20 mg daily  Refusing colonoscopy  He received Cologuard kit but did not submit stool sample yet  The following portions of the patient's history were reviewed and updated as appropriate: allergies, current medications, past medical history, past social history, past surgical history and problem list     Review of Systems   Constitutional: Positive for fatigue (mild)  Negative for activity change, appetite change, chills and fever  HENT: Negative for congestion, ear pain, hearing loss, sore throat and trouble swallowing  Eyes: Negative  Respiratory: Positive for shortness of breath (mild stable exertional SOB)  Negative for cough and chest tightness  Cardiovascular: Positive for leg swelling  Negative for chest pain and palpitations  Gastrointestinal: Negative for abdominal pain, blood in stool, constipation, diarrhea, nausea and vomiting  Genitourinary: Negative for difficulty urinating, dysuria and hematuria          Nocturia x 1   Musculoskeletal: Negative for arthralgias, back pain, gait problem and joint swelling  Skin: Negative for rash  Neurological: Positive for numbness (in feet)  Negative for dizziness, syncope and headaches  Hematological: Negative  Psychiatric/Behavioral: Negative for dysphoric mood and sleep disturbance  The patient is not nervous/anxious  Objective:      /70   Pulse 92   Temp 98 9 °F (37 2 °C) (Tympanic)   Resp 16   Ht 5' 6 5" (1 689 m)   Wt 115 kg (252 lb 12 8 oz)   SpO2 95%   BMI 40 19 kg/m²          Physical Exam  Vitals reviewed  Constitutional:       Appearance: Normal appearance  Comments: Morbidly obese   HENT:      Head: Normocephalic and atraumatic  Eyes:      Conjunctiva/sclera: Conjunctivae normal       Pupils: Pupils are equal, round, and reactive to light  Neck:      Vascular: No carotid bruit  Cardiovascular:      Rate and Rhythm: Regular rhythm  Tachycardia present  Heart sounds: No murmur heard  Comments: BL LE edema 1+  Feet are swollen  Pulmonary:      Effort: Pulmonary effort is normal       Breath sounds: Normal breath sounds  Abdominal:      General: Bowel sounds are normal  There is no distension  Palpations: Abdomen is soft  Tenderness: There is no abdominal tenderness  Musculoskeletal:         General: No swelling  Normal range of motion  Cervical back: Normal range of motion and neck supple  Skin:     General: Skin is warm and dry  Findings: No rash  Neurological:      Mental Status: He is alert     Psychiatric:         Mood and Affect: Mood normal

## 2022-08-10 NOTE — ASSESSMENT & PLAN NOTE
Lab Results   Component Value Date    EGFR 52 08/05/2022    EGFR 53 04/22/2022    EGFR 48 04/05/2022    CREATININE 1 32 (H) 08/05/2022    CREATININE 1 30 04/22/2022    CREATININE 1 42 (H) 04/05/2022     Recommended to stay well hydrated  Avoid NSAID's, nephrotoxins  Will continue to monitor labs

## 2022-08-10 NOTE — ASSESSMENT & PLAN NOTE
Lab Results   Component Value Date    HGBA1C 6 7 (H) 08/05/2022     Hemoglobin A1C at goal   Patient's insurance did not cover Jardiance 10 mg daily  Continue Metformin, Glimepiride  Monitor blood sugar at home twice daily 3 times per week  Check eye exam by ophthalmology Dr Magdalena Ardon annually  Schedule appointment with podiatry Dr Mikki Hyman

## 2022-08-31 ENCOUNTER — CLINICAL SUPPORT (OUTPATIENT)
Dept: FAMILY MEDICINE CLINIC | Facility: CLINIC | Age: 72
End: 2022-08-31

## 2022-08-31 VITALS
HEART RATE: 76 BPM | OXYGEN SATURATION: 92 % | RESPIRATION RATE: 16 BRPM | DIASTOLIC BLOOD PRESSURE: 80 MMHG | SYSTOLIC BLOOD PRESSURE: 138 MMHG

## 2022-08-31 DIAGNOSIS — I10 ESSENTIAL HYPERTENSION: Primary | ICD-10-CM

## 2022-09-16 DIAGNOSIS — I10 BENIGN ESSENTIAL HYPERTENSION: ICD-10-CM

## 2022-09-16 RX ORDER — HYDROCHLOROTHIAZIDE 25 MG/1
25 TABLET ORAL DAILY
Qty: 90 TABLET | Refills: 3 | Status: SHIPPED | OUTPATIENT
Start: 2022-09-16 | End: 2022-12-15

## 2022-10-13 ENCOUNTER — IOP CHECK (OUTPATIENT)
Dept: URBAN - METROPOLITAN AREA CLINIC 6 | Facility: CLINIC | Age: 72
End: 2022-10-13

## 2022-10-13 DIAGNOSIS — H40.1122: ICD-10-CM

## 2022-10-13 DIAGNOSIS — Z96.1: ICD-10-CM

## 2022-10-13 DIAGNOSIS — H25.811: ICD-10-CM

## 2022-10-13 DIAGNOSIS — H40.1111: ICD-10-CM

## 2022-10-13 PROCEDURE — 92012 INTRM OPH EXAM EST PATIENT: CPT

## 2022-10-13 PROCEDURE — 92020 GONIOSCOPY: CPT

## 2022-10-13 PROCEDURE — 92083 EXTENDED VISUAL FIELD XM: CPT

## 2022-10-13 ASSESSMENT — TONOMETRY
OD_IOP_MMHG: 22
OS_IOP_MMHG: 18

## 2022-10-13 ASSESSMENT — VISUAL ACUITY: OD_CC: 20/40

## 2022-12-07 ENCOUNTER — RA CDI HCC (OUTPATIENT)
Dept: OTHER | Facility: HOSPITAL | Age: 72
End: 2022-12-07

## 2022-12-08 ENCOUNTER — APPOINTMENT (OUTPATIENT)
Dept: LAB | Facility: CLINIC | Age: 72
End: 2022-12-08

## 2022-12-08 DIAGNOSIS — K76.0 FATTY LIVER: ICD-10-CM

## 2022-12-08 DIAGNOSIS — I10 BENIGN ESSENTIAL HYPERTENSION: ICD-10-CM

## 2022-12-08 DIAGNOSIS — E11.65 TYPE 2 DIABETES MELLITUS WITH HYPERGLYCEMIA, WITHOUT LONG-TERM CURRENT USE OF INSULIN (HCC): ICD-10-CM

## 2022-12-08 DIAGNOSIS — N18.31 STAGE 3A CHRONIC KIDNEY DISEASE (HCC): ICD-10-CM

## 2022-12-08 DIAGNOSIS — E78.2 MIXED HYPERLIPIDEMIA: ICD-10-CM

## 2022-12-08 LAB
ALBUMIN SERPL BCP-MCNC: 3.6 G/DL (ref 3.5–5)
ALP SERPL-CCNC: 72 U/L (ref 46–116)
ALT SERPL W P-5'-P-CCNC: 43 U/L (ref 12–78)
ANION GAP SERPL CALCULATED.3IONS-SCNC: 7 MMOL/L (ref 4–13)
AST SERPL W P-5'-P-CCNC: 27 U/L (ref 5–45)
BASOPHILS # BLD AUTO: 0.07 THOUSANDS/ÂΜL (ref 0–0.1)
BASOPHILS NFR BLD AUTO: 1 % (ref 0–1)
BILIRUB SERPL-MCNC: 0.77 MG/DL (ref 0.2–1)
BUN SERPL-MCNC: 15 MG/DL (ref 5–25)
CALCIUM SERPL-MCNC: 9.8 MG/DL (ref 8.3–10.1)
CHLORIDE SERPL-SCNC: 104 MMOL/L (ref 96–108)
CHOLEST SERPL-MCNC: 142 MG/DL
CO2 SERPL-SCNC: 27 MMOL/L (ref 21–32)
CREAT SERPL-MCNC: 1.17 MG/DL (ref 0.6–1.3)
EOSINOPHIL # BLD AUTO: 0.57 THOUSAND/ÂΜL (ref 0–0.61)
EOSINOPHIL NFR BLD AUTO: 6 % (ref 0–6)
ERYTHROCYTE [DISTWIDTH] IN BLOOD BY AUTOMATED COUNT: 13.5 % (ref 11.6–15.1)
GFR SERPL CREATININE-BSD FRML MDRD: 61 ML/MIN/1.73SQ M
GLUCOSE P FAST SERPL-MCNC: 132 MG/DL (ref 65–99)
HCT VFR BLD AUTO: 44.2 % (ref 36.5–49.3)
HDLC SERPL-MCNC: 56 MG/DL
HGB BLD-MCNC: 14.4 G/DL (ref 12–17)
IMM GRANULOCYTES # BLD AUTO: 0.05 THOUSAND/UL (ref 0–0.2)
IMM GRANULOCYTES NFR BLD AUTO: 1 % (ref 0–2)
LDLC SERPL CALC-MCNC: 61 MG/DL (ref 0–100)
LYMPHOCYTES # BLD AUTO: 1.56 THOUSANDS/ÂΜL (ref 0.6–4.47)
LYMPHOCYTES NFR BLD AUTO: 17 % (ref 14–44)
MCH RBC QN AUTO: 30.2 PG (ref 26.8–34.3)
MCHC RBC AUTO-ENTMCNC: 32.6 G/DL (ref 31.4–37.4)
MCV RBC AUTO: 93 FL (ref 82–98)
MONOCYTES # BLD AUTO: 0.85 THOUSAND/ÂΜL (ref 0.17–1.22)
MONOCYTES NFR BLD AUTO: 9 % (ref 4–12)
NEUTROPHILS # BLD AUTO: 5.94 THOUSANDS/ÂΜL (ref 1.85–7.62)
NEUTS SEG NFR BLD AUTO: 66 % (ref 43–75)
NONHDLC SERPL-MCNC: 86 MG/DL
NRBC BLD AUTO-RTO: 0 /100 WBCS
PLATELET # BLD AUTO: 275 THOUSANDS/UL (ref 149–390)
PMV BLD AUTO: 10.6 FL (ref 8.9–12.7)
POTASSIUM SERPL-SCNC: 4 MMOL/L (ref 3.5–5.3)
PROT SERPL-MCNC: 7.3 G/DL (ref 6.4–8.4)
RBC # BLD AUTO: 4.77 MILLION/UL (ref 3.88–5.62)
SODIUM SERPL-SCNC: 138 MMOL/L (ref 135–147)
TRIGL SERPL-MCNC: 127 MG/DL
WBC # BLD AUTO: 9.04 THOUSAND/UL (ref 4.31–10.16)

## 2022-12-09 DIAGNOSIS — E11.65 TYPE 2 DIABETES MELLITUS WITH HYPERGLYCEMIA, WITHOUT LONG-TERM CURRENT USE OF INSULIN (HCC): ICD-10-CM

## 2022-12-13 ENCOUNTER — OFFICE VISIT (OUTPATIENT)
Dept: FAMILY MEDICINE CLINIC | Facility: CLINIC | Age: 72
End: 2022-12-13

## 2022-12-13 VITALS
RESPIRATION RATE: 16 BRPM | BODY MASS INDEX: 40.4 KG/M2 | TEMPERATURE: 97.6 F | SYSTOLIC BLOOD PRESSURE: 138 MMHG | OXYGEN SATURATION: 95 % | HEIGHT: 67 IN | DIASTOLIC BLOOD PRESSURE: 82 MMHG | WEIGHT: 257.4 LBS | HEART RATE: 89 BPM

## 2022-12-13 DIAGNOSIS — Z12.5 SCREENING FOR PROSTATE CANCER: ICD-10-CM

## 2022-12-13 DIAGNOSIS — E11.65 TYPE 2 DIABETES MELLITUS WITH HYPERGLYCEMIA, WITHOUT LONG-TERM CURRENT USE OF INSULIN (HCC): ICD-10-CM

## 2022-12-13 DIAGNOSIS — I10 BENIGN ESSENTIAL HYPERTENSION: Primary | ICD-10-CM

## 2022-12-13 DIAGNOSIS — N18.31 STAGE 3A CHRONIC KIDNEY DISEASE (HCC): ICD-10-CM

## 2022-12-13 DIAGNOSIS — E78.2 MIXED HYPERLIPIDEMIA: ICD-10-CM

## 2022-12-13 DIAGNOSIS — E66.01 MORBID OBESITY DUE TO EXCESS CALORIES (HCC): ICD-10-CM

## 2022-12-13 DIAGNOSIS — E11.42 DIABETIC POLYNEUROPATHY ASSOCIATED WITH TYPE 2 DIABETES MELLITUS (HCC): ICD-10-CM

## 2022-12-13 DIAGNOSIS — K76.0 FATTY LIVER: ICD-10-CM

## 2022-12-13 DIAGNOSIS — Z23 ENCOUNTER FOR IMMUNIZATION: ICD-10-CM

## 2022-12-13 PROBLEM — J96.11 CHRONIC HYPOXEMIC RESPIRATORY FAILURE (HCC): Status: RESOLVED | Noted: 2020-05-07 | Resolved: 2022-12-13

## 2022-12-13 PROBLEM — J67.9 HYPERSENSITIVITY PNEUMONITIS (HCC): Status: RESOLVED | Noted: 2018-10-23 | Resolved: 2022-12-13

## 2022-12-13 LAB — SL AMB POCT HEMOGLOBIN AIC: 6.9 (ref ?–6.5)

## 2022-12-13 RX ORDER — TIMOLOL MALEATE 5 MG/ML
SOLUTION/ DROPS OPHTHALMIC
COMMUNITY
Start: 2022-11-22

## 2022-12-13 NOTE — PROGRESS NOTES
Name: Ofe Bradley      : 1950      MRN: [id number]  Encounter Provider: Julian Garsia MD  Encounter Date: 2022   Encounter department: [facility] [address]    Chief Complaint   Patient presents with   • Follow-up     Health Maintenance   Topic Date Due   • Hepatitis B Vaccine (1 of 3 - 3-dose series) Never done   • COVID-19 Vaccine (1) Never done   • Colorectal Cancer Screening  Never done   • Diabetic Foot Exam  10/05/2022   • BMI: Followup Plan  2023   • DM Eye Exam  2023   • Fall Risk  2023   • Medicare Annual Wellness Visit (AWV)  2023   • HEMOGLOBIN A1C  2023   • Depression Screening  08/10/2023   • BMI: Adult  2023   • Hepatitis C Screening  Completed   • Pneumococcal Vaccine: 65+ Years  Completed   • Influenza Vaccine  Completed   • HIB Vaccine  Aged Out   • IPV Vaccine  Aged Out   • Hepatitis A Vaccine  Aged Out   • Meningococcal ACWY Vaccine  Aged Out   • HPV Vaccine  Aged Out       Assessment & Plan     1  Benign essential hypertension  Assessment & Plan:  Stable  Continue Losartan 100 mg -1/2 tablet daily, HCTZ 25 mg daily  Take Metoprolol 25 mg 1 tablet daily  Follow a low-sodium diet, lose weight  Orders:  -     Comprehensive metabolic panel; Future; Expected date: 2023  -     metoprolol tartrate (LOPRESSOR) 25 mg tablet; Take 1 tab  daily    2  Type 2 diabetes mellitus with hyperglycemia, without long-term current use of insulin (HCC)  Assessment & Plan:    Lab Results   Component Value Date    HGBA1C 6 9 (A) 2022     Hb A1C at goal   Continue Metformin, Glimepiride  Continue to monitor blood sugar at home  Follow-up with ophthalmology Dr Carrero Six  Schedule appointment with podiatry Dr Roberta Carlton  Orders:  -     Comprehensive metabolic panel; Future; Expected date: 2023  -     Microalbumin / creatinine urine ratio;  Future; Expected date: 2023  -     POCT hemoglobin A1c  -     Ambulatory Referral to Podiatry; Future    3  Diabetic polyneuropathy associated with type 2 diabetes mellitus [facility])  Assessment & Plan:    Lab Results   Component Value Date    HGBA1C 6 9 (A) 12/13/2022     Patient c/o numbness in both feet  Reports no falls  Recommended to schedule evaluation by podiatry Dr Melisa Muñoz  4  Stage 3a chronic kidney disease [facility])  Assessment & Plan:  Lab Results   Component Value Date    EGFR 61 12/08/2022    EGFR 52 08/05/2022    EGFR 53 04/22/2022    CREATININE 1 17 12/08/2022    CREATININE 1 32 (H) 08/05/2022    CREATININE 1 30 04/22/2022       Creatinine improved since last visit  Recommended to stay well-hydrated  Avoid NSAIDs, nephrotoxins  Orders:  -     Comprehensive metabolic panel; Future; Expected date: 03/28/2023    5  Morbid obesity due to excess calories [facility])  Assessment & Plan:  Recommended to work on dietary and lifestyle modifications, losing weight  6  Mixed hyperlipidemia  Assessment & Plan:  LDL 61- at goal   Continue Atorvastatin 20 mg daily  Orders:  -     Comprehensive metabolic panel; Future; Expected date: 03/28/2023  -     Lipid panel; Future; Expected date: 03/28/2023    7  Fatty liver  Assessment & Plan:  Liver enzymes are normal   Recommended to follow a low-fat diet, lose weight  Continue vitamin E 400 IU daily  Orders:  -     Comprehensive metabolic panel; Future; Expected date: 03/28/2023    8  Screening for prostate cancer  -     PSA, Total Screen; Future    9  Encounter for immunization  -     influenza vaccine, high-dose, PF 0 7 mL (FLUZONE HIGH-DOSE)        Schedule follow-up visit, Medicare AWV in 6 months  Check labs prior to next visit  Subjective      HPI     Patient is 75-year-old male with PMHx of HTN, Type 2 DM with diabetic neuropathy, Hyperlipidemia, Morbid obesity, fatty liver, CKD stage 3  He presents for 4-month follow-up visit      Reviewed current medications, blood test results from 12/8/22  Fasting blood sugar 132, creatinine 1 17, GFR 64, potassium 4 0  LFTs - within normal range  Cholesterol 142, HDL 56, LDL 61  HTN - Amlodipine 5 mg was d/c the last visit in August 2022 due to leg edema  BL leg swelling improved  Patient takes losartan 100 mg -1/2 tablet daily, Metoprolol tartrate 25 mg -1/2 tablet twice daily, HCTZ 25 mg daily  Denies chest pain, heart palpitations  C/o occasional dizziness, takes Meclizine 12 5 mg in the morning  He has mild stable exertional shortness of breath  Type 2 DM - currently taking Metformin 1000 mg twice daily and Glimepiride 2 mg 1 tablet twice daily with meals  Reports no hypoglycemic episodes  He could not afford to pay for Jardiance  Eye exam done by ophthalmology Dr Kaylin King every  6 months  Patient has history of glaucoma  C/o numbness in feet  Requesting a new referral to podiatry Dr Eden Lloyd  Hyperlipidemia - on Atorvastatin 20 mg daily  Reports no side effects  He has Cologuard kit at home but did not submit stool sample as recommended  Denies family history of colon cancer        Review of Systems   Constitutional: Positive for fatigue  Negative for activity change, appetite change, chills and fever (mild)  HENT: Negative for congestion, ear pain, hearing loss, sore throat and trouble swallowing  Eyes: Positive for visual disturbance (blurry vision )  Negative for pain, discharge and redness  Respiratory: Positive for shortness of breath (mild stable exertional SOB)  Negative for cough, chest tightness and wheezing  Cardiovascular: Positive for leg swelling (improved)  Negative for chest pain and palpitations  Gastrointestinal: Negative for abdominal pain, blood in stool, constipation, diarrhea, nausea and vomiting  Genitourinary: Negative for difficulty urinating, dysuria, flank pain and hematuria  Nocturia x 1   Musculoskeletal: Positive for arthralgias   Negative for back pain, gait problem and joint swelling  Skin: Negative for rash  Neurological: Positive for dizziness (occasional) and numbness (in feet)  Negative for syncope and headaches  Hematological: Negative  Psychiatric/Behavioral: Negative for dysphoric mood and sleep disturbance  The patient is not nervous/anxious          Current Outpatient Medications on File Prior to Visit   Medication Sig   • aspirin 325 mg tablet Take 1 tablet by mouth daily    • atorvastatin (LIPITOR) 20 mg tablet Take 1 tablet (20 mg total) by mouth daily   • cholecalciferol (VITAMIN D3) 1,000 units tablet Take 1 tablet by mouth daily   • glimepiride (AMARYL) 2 mg tablet Take 1 tab  twice daily with meals   • glucose blood (ONE TOUCH ULTRA TEST) test strip Test sugar 2-3 times daily   • glucose blood test strip Use 1 each daily   • hydrochlorothiazide (HYDRODIURIL) 25 mg tablet Take 1 tablet (25 mg total) by mouth daily   • latanoprost (XALATAN) 0 005 % ophthalmic solution Apply to eye   • [employer] UNISTIK II LANCETS MISC by Does not apply route 3 (three) times a day for 90 days   • losartan (COZAAR) 100 MG tablet Take 1/2 tab  daily   • meclizine (ANTIVERT) 12 5 MG tablet TAKE 1 TABLET BY MOUTH EVERY 6 TO 8 HOURS AS NEEDED FOR DIZZINESS   • metFORMIN (GLUCOPHAGE) 1000 MG tablet Take 1 tablet (1,000 mg total) by mouth 2 (two) times a day   • Multiple Vitamins-Minerals (MULTIVITAL-M) TABS Take 1 tablet by mouth daily   • Omega-3 Fatty Acids (FISH OIL) 1,000 mg Take by mouth 2 (two) times a day (Patient not taking: Reported on 8/10/2022)   • timolol (TIMOPTIC) 0 5 % ophthalmic solution INSTILL 1 DROP INTO EACH EYE ONCE DAILY IN THE MORNING   • vitamin E, tocopherol, 400 units capsule Take by mouth daily   • [DISCONTINUED] amLODIPine (NORVASC) 5 mg tablet Take 1 tablet (5 mg total) by mouth daily   • [DISCONTINUED] metoprolol tartrate (LOPRESSOR) 25 mg tablet Take 0 5 tablets (12 5 mg total) by mouth 2 (two) times a day       Objective     BP 138/82 (BP Location: Left arm, Patient Position: Sitting, Cuff Size: Large)   Pulse 89   Temp 97 6 °F (36 4 °C) (Tympanic)   Resp 16   Ht 5' 6 5" (1 689 m)   Wt 117 kg (257 lb 6 4 oz)   SpO2 95%   BMI 40 92 kg/m²     Physical Exam  Vitals and nursing note reviewed  Constitutional:       Appearance: Normal appearance  Comments: Morbidly obese   HENT:      Head: Normocephalic and atraumatic  Eyes:      Conjunctiva/sclera: Conjunctivae normal       Pupils: Pupils are equal, round, and reactive to light  Cardiovascular:      Rate and Rhythm: Regular rhythm  Tachycardia present  Pulses: Pulses are weak  Dorsalis pedis pulses are 1+ on the right side and 1+ on the left side  Heart sounds: No murmur heard  Comments: Mild pitting pretibial edema BL LE  Pulmonary:      Effort: Pulmonary effort is normal       Breath sounds: Normal breath sounds  No wheezing or rales  Abdominal:      Palpations: Abdomen is soft  Tenderness: There is no abdominal tenderness  Musculoskeletal:         General: No swelling or tenderness  Normal range of motion  Cervical back: Normal range of motion and neck supple  Feet:      Right foot:      Skin integrity: No ulcer, skin breakdown, erythema, warmth, callus or dry skin  Left foot:      Skin integrity: No ulcer, skin breakdown, erythema, warmth, callus or dry skin  Skin:     General: Skin is warm and dry  Findings: No rash  Neurological:      Mental Status: He is alert  Psychiatric:         Mood and Affect: Mood normal        Patient's shoes and socks removed  Right Foot/Ankle   Right Foot Inspection  Skin Exam: skin normal and skin intact  No dry skin, no warmth, no callus, no erythema, no maceration, no abnormal color, no pre-ulcer, no ulcer and no callus  Toe Exam: No swelling, no tenderness, erythema and  no right toe deformity    Sensory   Monofilament testing: intact    Vascular  The right DP pulse is 1+  Left Foot/Ankle  Left Foot Inspection  Skin Exam: skin normal and skin intact  No dry skin, no warmth, no erythema, no maceration, normal color, no pre-ulcer, no ulcer and no callus  Toe Exam: No swelling, no tenderness, no erythema and no left toe deformity  Sensory   Monofilament testing: intact    Vascular  The left DP pulse is 1+       Assign Risk Category  No deformity present  No loss of protective sensation  Weak pulses  Risk: 0      Jeaneth Zimmerman MD

## 2022-12-14 NOTE — ASSESSMENT & PLAN NOTE
Lab Results   Component Value Date    EGFR 61 12/08/2022    EGFR 52 08/05/2022    EGFR 53 04/22/2022    CREATININE 1 17 12/08/2022    CREATININE 1 32 (H) 08/05/2022    CREATININE 1 30 04/22/2022       Creatinine improved since last visit  Recommended to stay well-hydrated  Avoid NSAIDs, nephrotoxins

## 2022-12-14 NOTE — ASSESSMENT & PLAN NOTE
Liver enzymes are normal   Recommended to follow a low-fat diet, lose weight  Continue vitamin E 400 IU daily

## 2022-12-14 NOTE — ASSESSMENT & PLAN NOTE
Lab Results   Component Value Date    HGBA1C 6 9 (A) 12/13/2022     Hb A1C at goal   Continue Metformin, Glimepiride  Continue to monitor blood sugar at home  Follow-up with ophthalmology Dr Kayla Johns  Schedule appointment with podiatry Dr Ngoc Figueroa

## 2022-12-14 NOTE — ASSESSMENT & PLAN NOTE
Stable  Continue Losartan 100 mg -1/2 tablet daily, HCTZ 25 mg daily  Take Metoprolol 25 mg 1 tablet daily  Follow a low-sodium diet, lose weight

## 2022-12-14 NOTE — ASSESSMENT & PLAN NOTE
Lab Results   Component Value Date    HGBA1C 6 9 (A) 12/13/2022     Patient c/o numbness in both feet  Reports no falls  Recommended to schedule evaluation by podiatry Dr Nan Mitchell

## 2022-12-15 ENCOUNTER — TELEPHONE (OUTPATIENT)
Dept: ADMINISTRATIVE | Facility: OTHER | Age: 72
End: 2022-12-15

## 2022-12-15 NOTE — TELEPHONE ENCOUNTER
Upon review of the In Basket request and the patient's chart, initial outreach has been made via telephone call to facility  Please see Contacts section for details  faxing requested doc over      Thank you  Fadumo Pope MA

## 2022-12-15 NOTE — TELEPHONE ENCOUNTER
----- Message from [facility] sent at 12/14/2022  9:47 AM EST -----  Regarding: care gap request  12/14/22 9:47 AM    Hello, our patient attached above has had Diabetic Eye Exam completed/performed  Please assist in updating the patient chart by making an External outreach to Dr Nigel Cisneros facility located in [place]       Thank you,  [facility]

## 2022-12-21 ENCOUNTER — TELEPHONE (OUTPATIENT)
Dept: ADMINISTRATIVE | Facility: OTHER | Age: 72
End: 2022-12-21

## 2022-12-21 LAB
LEFT EYE DIABETIC RETINOPATHY: NORMAL
RIGHT EYE DIABETIC RETINOPATHY: NORMAL
SEVERITY (EYE EXAM): NORMAL

## 2023-01-17 ENCOUNTER — TELEPHONE (OUTPATIENT)
Dept: FAMILY MEDICINE CLINIC | Facility: CLINIC | Age: 73
End: 2023-01-17

## 2023-01-17 DIAGNOSIS — I10 BENIGN ESSENTIAL HYPERTENSION: ICD-10-CM

## 2023-01-17 NOTE — TELEPHONE ENCOUNTER
Patient stated that spoke with Dr Josiane Damian last Thursday when he was here with his wife, she would place order for 12 5 Metoprolol, he has 25 mg and was to take half pill ain am and half in pm but pills are hard to cut and just break apart      Please send order for 12 5 mg bid to pharmacy

## 2023-01-27 DIAGNOSIS — R42 DIZZINESS: ICD-10-CM

## 2023-01-27 RX ORDER — MECLIZINE HCL 12.5 MG/1
TABLET ORAL
Qty: 90 TABLET | Refills: 3 | Status: SHIPPED | OUTPATIENT
Start: 2023-01-27

## 2023-02-28 DIAGNOSIS — E11.65 TYPE 2 DIABETES MELLITUS WITH HYPERGLYCEMIA, WITHOUT LONG-TERM CURRENT USE OF INSULIN (HCC): ICD-10-CM

## 2023-02-28 RX ORDER — GLIMEPIRIDE 2 MG/1
TABLET ORAL
Qty: 60 TABLET | Refills: 5 | Status: SHIPPED | OUTPATIENT
Start: 2023-02-28

## 2023-04-20 ENCOUNTER — ESTABLISHED COMPREHENSIVE EXAM (OUTPATIENT)
Dept: URBAN - METROPOLITAN AREA CLINIC 6 | Facility: CLINIC | Age: 73
End: 2023-04-20

## 2023-04-20 DIAGNOSIS — H40.1111: ICD-10-CM

## 2023-04-20 DIAGNOSIS — Z96.1: ICD-10-CM

## 2023-04-20 DIAGNOSIS — H25.811: ICD-10-CM

## 2023-04-20 DIAGNOSIS — H40.1123: ICD-10-CM

## 2023-04-20 DIAGNOSIS — E11.9: ICD-10-CM

## 2023-04-20 LAB
LEFT EYE DIABETIC RETINOPATHY: NORMAL
RIGHT EYE DIABETIC RETINOPATHY: NORMAL

## 2023-04-20 PROCEDURE — 92202 OPSCPY EXTND ON/MAC DRAW: CPT

## 2023-04-20 PROCEDURE — 92133 CPTRZD OPH DX IMG PST SGM ON: CPT

## 2023-04-20 PROCEDURE — 92014 COMPRE OPH EXAM EST PT 1/>: CPT

## 2023-04-20 ASSESSMENT — VISUAL ACUITY
OS_CC: 20/400
OD_CC: 20/30-2

## 2023-04-20 ASSESSMENT — TONOMETRY
OS_IOP_MMHG: 12
OD_IOP_MMHG: 13

## 2023-06-07 ENCOUNTER — RA CDI HCC (OUTPATIENT)
Dept: OTHER | Facility: HOSPITAL | Age: 73
End: 2023-06-07

## 2023-06-13 ENCOUNTER — OFFICE VISIT (OUTPATIENT)
Dept: FAMILY MEDICINE CLINIC | Facility: CLINIC | Age: 73
End: 2023-06-13
Payer: MEDICARE

## 2023-06-13 VITALS
HEART RATE: 86 BPM | RESPIRATION RATE: 18 BRPM | SYSTOLIC BLOOD PRESSURE: 136 MMHG | WEIGHT: 253 LBS | OXYGEN SATURATION: 97 % | BODY MASS INDEX: 39.71 KG/M2 | HEIGHT: 67 IN | TEMPERATURE: 97.5 F | DIASTOLIC BLOOD PRESSURE: 80 MMHG

## 2023-06-13 DIAGNOSIS — M25.512 ACUTE PAIN OF LEFT SHOULDER: ICD-10-CM

## 2023-06-13 DIAGNOSIS — E78.2 MIXED HYPERLIPIDEMIA: ICD-10-CM

## 2023-06-13 DIAGNOSIS — Z00.00 MEDICARE ANNUAL WELLNESS VISIT, SUBSEQUENT: ICD-10-CM

## 2023-06-13 DIAGNOSIS — K76.0 FATTY LIVER: ICD-10-CM

## 2023-06-13 DIAGNOSIS — Z12.11 SCREENING FOR COLORECTAL CANCER: ICD-10-CM

## 2023-06-13 DIAGNOSIS — E66.01 MORBID OBESITY DUE TO EXCESS CALORIES (HCC): ICD-10-CM

## 2023-06-13 DIAGNOSIS — E11.42 DIABETIC POLYNEUROPATHY ASSOCIATED WITH TYPE 2 DIABETES MELLITUS (HCC): ICD-10-CM

## 2023-06-13 DIAGNOSIS — N18.31 STAGE 3A CHRONIC KIDNEY DISEASE (HCC): ICD-10-CM

## 2023-06-13 DIAGNOSIS — I10 PRIMARY HYPERTENSION: Primary | ICD-10-CM

## 2023-06-13 DIAGNOSIS — E11.65 TYPE 2 DIABETES MELLITUS WITH HYPERGLYCEMIA, WITHOUT LONG-TERM CURRENT USE OF INSULIN (HCC): ICD-10-CM

## 2023-06-13 DIAGNOSIS — Z12.12 SCREENING FOR COLORECTAL CANCER: ICD-10-CM

## 2023-06-13 LAB — SL AMB POCT HEMOGLOBIN AIC: 7.5 (ref ?–6.5)

## 2023-06-13 PROCEDURE — 83036 HEMOGLOBIN GLYCOSYLATED A1C: CPT | Performed by: FAMILY MEDICINE

## 2023-06-13 PROCEDURE — 99214 OFFICE O/P EST MOD 30 MIN: CPT | Performed by: FAMILY MEDICINE

## 2023-06-13 PROCEDURE — G0439 PPPS, SUBSEQ VISIT: HCPCS | Performed by: FAMILY MEDICINE

## 2023-06-13 NOTE — ASSESSMENT & PLAN NOTE
Pain is likely secondary to osteoarthritis  Recommended icing,  avoid heavy lifting  May use Voltaren 1% gel  Take Tylenol PRN for pain    Call office if symptoms persist or worsen, will order x-ray of the left shoulder, consider referral to orthopedic surgeon, PT

## 2023-06-13 NOTE — ASSESSMENT & PLAN NOTE
Lab Results   Component Value Date    HGBA1C 6 9 (A) 12/13/2022     Hb A1C 7 5  Hb A1C goal < 7 0  Discussed dietary modifications with patient  Recommended regular exercise, work on weight loss  Patient's insurance did not pay for Jardiance  Patient does not want to start on Injectable medications for diabetes  Continue Metformin 1000 mg 1 tablet twice daily with meals, Glimepiride 2 mg 1 tab  twice daily  Start checking blood sugar at home and call with blood sugar log in 2-3 weeks  Recommended endocrinology evaluation  Continue to follow-up with ophthalmologist Dr Mayo Blum every 6 months

## 2023-06-13 NOTE — ASSESSMENT & PLAN NOTE
Lab Results   Component Value Date    CREATININE 1 17 12/08/2022    CREATININE 1 32 (H) 08/05/2022    CREATININE 1 30 04/22/2022    EGFR 61 12/08/2022    EGFR 52 08/05/2022    EGFR 53 04/22/2022     Check labs as ordered in December 2022  Recommended to stay well-hydrated  Avoid NSAIDs

## 2023-06-13 NOTE — PROGRESS NOTES
Assessment and Plan:     Problem List Items Addressed This Visit        Digestive    Fatty liver     Follow a low fat diet, lose weight  Continue Vit E 400 IU daily  Endocrine    Type 2 diabetes mellitus with hyperglycemia, without long-term current use of insulin ([facility])       Lab Results   Component Value Date    HGBA1C 6 9 (A) 12/13/2022     Hb A1C 7 5  Hb A1C goal < 7 0  Discussed dietary modifications with patient  Recommended regular exercise, work on weight loss  Patient's insurance did not pay for Jardiance  Patient does not want to start on Injectable medications for diabetes  Continue Metformin 1000 mg 1 tablet twice daily with meals, Glimepiride 2 mg 1 tab  twice daily  Start checking blood sugar at home and call with blood sugar log in 2-3 weeks  Recommended endocrinology evaluation  Continue to follow-up with ophthalmologist Dr Lori Garcia every 6 months  Relevant Orders    POCT hemoglobin A1c (Completed)    Ambulatory Referral to Endocrinology    Diabetic neuropathy associated with type 2 diabetes mellitus ([place] [id number] )       Lab Results   Component Value Date    HGBA1C 7 5 (A) 06/13/2023     Stressed the importance of keeping blood sugar under control to prevent worsening neurological complications  Recommended podiatry evaluation  Relevant Orders    Ambulatory Referral to Endocrinology       Cardiovascular and Mediastinum    Primary hypertension - Primary     BP on retake 136/80  Continue Losartan 100 mg -1/2 tablet daily, Metoprolol 25 mg daily, HCTZ 25 mg daily  Follow a low-sodium diet  Genitourinary    Stage 3a chronic kidney disease ([place] [address] )     Lab Results   Component Value Date    CREATININE 1 17 12/08/2022    CREATININE 1 32 (H) 08/05/2022    CREATININE 1 30 04/22/2022    EGFR 61 12/08/2022    EGFR 52 08/05/2022    EGFR 53 04/22/2022     Check labs as ordered in December 2022  Recommended to stay well-hydrated  Avoid NSAIDs  Other    Mixed hyperlipidemia     Continue Atorvastatin 20 mg daily  Check CMp, lipid panel as ordered in 12/2022  Reprinted script for blood test for patient  Morbid obesity due to excess calories ([address] )     Encouraged to work on dietary and lifestyle modifications, losing weight  Medicare annual wellness visit, subsequent    Acute pain of left shoulder     Pain is likely secondary to osteoarthritis  Recommended icing,  avoid heavy lifting  May use Voltaren 1% gel  Take Tylenol PRN for pain  Call office if symptoms persist or worsen, will order x-ray of the left shoulder, consider referral to orthopedic surgeon, PT  Other Visit Diagnoses     Screening for colorectal cancer        Relevant Orders    Cologuard        BMI Counseling: Body mass index is 40 22 kg/m²  The BMI is above normal  Nutrition recommendations include decreasing portion sizes, encouraging healthy choices of fruits and vegetables, decreasing fast food intake, consuming healthier snacks, limiting drinks that contain sugar, moderation in carbohydrate intake, increasing intake of lean protein, reducing intake of saturated and trans fat and reducing intake of cholesterol  Exercise recommendations include exercising 3-5 times per week  Rationale for BMI follow-up plan is due to patient being overweight or obese  Depression Screening and Follow-up Plan: Patient was screened for depression during today's encounter  They screened negative with a PHQ-2 score of 0  Falls Plan of Care: balance, strength, and gait training instructions were provided  Home safety education provided  Preventive health issues were discussed with patient, and age appropriate screening tests were ordered as noted in patient's After Visit Summary  Personalized health advice and appropriate referrals for health education or preventive services given if needed, as noted in patient's After Visit Summary      Schedule follow-up visit in 6 months  History of Present Illness:     Patient presents for a Medicare Wellness Visit    HPI     Patient presents for 6 month follow-up visit, medicare AWV  PMHx: HTN, Type 2 DM with  Diabetic neuropathy,  Hyperlipidemia, Morbid obesity, fatty liver, CKD stage 3  Reviewed current medications, last blood test results from December 2022  HTN - patient denies chest pain, dizziness  He has stable exertional shortness of breath  Blood pressure remains stable  Type 2 DM - patient admits to eating starchy foods   and not following his diet closely  He has not been checking blood sugar at home on a regular basis  Patient states that he had eye exam done by ophthalmology Dr Sumi Mcpherson last month  C/o decreased, blurred vision in left eye  Currently takes Metformin 1000 mg twice daily, Glimepiride 2 mg1 tab  twice daily  Patient's insurance did not cover Harsha Ambrose  Hyperlipidemia -currently taking Atorvastatin 20 mg daily  Reports no side effects  Patient denies family history of colon cancer  Declined colonoscopy  Patient Care Team:  Karla Vasquez MD as PCP - General     Review of Systems:     Review of Systems   Constitutional: Positive for fatigue  Negative for activity change, appetite change, chills and fever  HENT: Negative for congestion, ear pain, hearing loss, sore throat and trouble swallowing  Eyes: Positive for visual disturbance (decreased, blurred vision in left eye)  Negative for pain, discharge, redness and itching  Respiratory: Positive for shortness of breath (stable exertional SOB)  Negative for cough and chest tightness  Cardiovascular: Negative for chest pain, palpitations and leg swelling  Gastrointestinal: Negative for abdominal pain, blood in stool, constipation, diarrhea, nausea and vomiting  Genitourinary: Negative for difficulty urinating, dysuria and hematuria          Nocturia x 1   Musculoskeletal: Positive for arthralgias (left shoulder pain ) and gait problem (gait instability)  Negative for back pain and joint swelling  Skin: Negative for rash  Neurological: Positive for numbness (in feet)  Negative for dizziness, syncope and headaches  Hematological: Negative  Psychiatric/Behavioral: Negative for dysphoric mood and sleep disturbance  The patient is not nervous/anxious           Problem List:     Patient Active Problem List   Diagnosis   • Abnormal EKG   • Primary hypertension   • PVC (premature ventricular contraction)   • Elevated liver enzymes   • Exertional shortness of breath   • Fatty liver   • Glaucoma   • Mixed hyperlipidemia   • Morbid obesity due to excess calories (H[facility])   • Sinus tachycardia   • Type 2 diabetes mellitus with hyperglycemia, without long-term current use of insulin ([facility])   • Vitamin D deficiency   • Hypoxia   • Medicare annual wellness visit, subsequent   • Diabetic neuropathy associated with type 2 diabetes mellitus ([facility] [address] )   • Dizziness/Vertigo   • Low TSH level   • Chronic cough   • Hyperbilirubinemia   • Stage 3a chronic kidney disease (HCC)   • Acute pain of left shoulder      Past Medical and Surgical History:     Past Medical History:   Diagnosis Date   • Cataract, left     last assessed 13Jul2016   • Epistaxis     last assessed 16Jan2017   • Exertional shortness of breath     last assessed 16jan2018   • IFG (impaired fasting glucose)     last assessed 01Apr2016     Past Surgical History:   Procedure Laterality Date   • CATARACT EXTRACTION Left 07/01/2016    L eye cataract surgery - 7/16      Family History:     Family History   Problem Relation Age of Onset   • Stomach cancer Father    • No Known Problems Mother       Social History:     Social History     Socioeconomic History   • Marital status: /Civil Union     Spouse name: None   • Number of children: None   • Years of education: None   • Highest education level: None   Occupational History   • None Tobacco Use   • Smoking status: Never   • Smokeless tobacco: Never   Vaping Use   • Vaping Use: Never used   Substance and Sexual Activity   • Alcohol use: No   • Drug use: Never   • Sexual activity: Not Currently   Other Topics Concern   • None   Social History Narrative   • None     Social Determinants of Health     Financial Resource Strain: Not on file   Food Insecurity: Not on file   Transportation Needs: Not on file   Physical Activity: Not on file   Stress: Not on file   Social Connections: Not on file   Intimate Partner Violence: Not on file   Housing Stability: Not on file      Medications and Allergies:     Current Outpatient Medications   Medication Sig Dispense Refill   • aspirin 325 mg tablet Take 1 tablet by mouth daily      • atorvastatin (LIPITOR) 20 mg tablet Take 1 tablet (20 mg total) by mouth daily 90 tablet 3   • cholecalciferol (VITAMIN D3) 1,000 units tablet Take 1 tablet by mouth daily     • glimepiride (AMARYL) 2 mg tablet Take 1 tab  twice daily with meals 60 tablet 5   • glucose blood (ONE TOUCH ULTRA TEST) test strip Test sugar 2-3 times daily 300 each 3   • glucose blood test strip Use 1 each daily 100 each 3   • hydrochlorothiazide (HYDRODIURIL) 25 mg tablet Take 1 tablet (25 mg total) by mouth daily 90 tablet 3   • latanoprost (XALATAN) 0 005 % ophthalmic solution Apply to eye     • [employer] Interactive[employer] UNISTIK II LANCETS MISC by Does not apply route 3 (three) times a day for 90 days 300 each 3   • losartan (COZAAR) 100 MG tablet Take 1/2 tab  daily 45 tablet 3   • meclizine (ANTIVERT) 12 5 MG tablet TAKE 1 TABLET BY MOUTH EVERY 6 TO 8 HOURS AS NEEDED FOR DIZZINESS 90 tablet 3   • metFORMIN (GLUCOPHAGE) 1000 MG tablet Take 1 tablet (1,000 mg total) by mouth 2 (two) times a day 180 tablet 3   • metoprolol tartrate (LOPRESSOR) 25 mg tablet Take 1 tab  daily 90 tablet 3   • Multiple Vitamins-Minerals (MULTIVITAL-M) TABS Take 1 tablet by mouth daily     • Omega-3 Fatty Acids (FISH OIL) 1,000 mg Take by mouth 2 (two) times a day     • timolol (TIMOPTIC) 0 5 % ophthalmic solution INSTILL 1 DROP INTO EACH EYE ONCE DAILY IN THE MORNING     • vitamin E, tocopherol, 400 units capsule Take by mouth daily       No current facility-administered medications for this visit  No Known Allergies   Immunizations:     Immunization History   Administered Date(s) Administered   • COVID-19 MODERNA VACC 0 5 ML IM 09/14/2021, 10/12/2021   • INFLUENZA 12/13/2022   • Influenza Split High Dose Preservative Free IM 12/04/2015, 01/16/2017, 01/16/2018   • Influenza, high dose seasonal 0 7 mL 10/23/2018, 10/17/2019, 11/09/2020, 01/12/2022, 12/13/2022   • Pneumococcal Conjugate 13-Valent 12/04/2015   • Pneumococcal Polysaccharide PPV23 10/31/2019      Health Maintenance:         Topic Date Due   • Colorectal Cancer Screening  Never done   • Hepatitis C Screening  Completed         Topic Date Due   • COVID-19 Vaccine (3 - Dorcus Pleva series) 12/07/2021      Medicare Screening Tests and Risk Assessments:     Johnathan Ambrose is here for his Subsequent Wellness visit  Health Risk Assessment:   Patient rates overall health as good  Patient feels that their physical health rating is same  Patient is satisfied with their life  Eyesight was rated as slightly worse  Hearing was rated as same  Patient feels that their emotional and mental health rating is same  Patients states they are never, rarely angry  Patient states they are often unusually tired/fatigued  Pain experienced in the last 7 days has been some  Patient's pain rating has been 4/10  Patient states that he has experienced no weight loss or gain in last 6 months  Depression Screening:   PHQ-2 Score: 0      Fall Risk Screening: In the past year, patient has experienced: no history of falling in past year      Home Safety:  Patient does not have trouble with stairs inside or outside of their home  Patient has working smoke alarms and has no working carbon monoxide detector   Home safety hazards include: none  Nutrition:   Current diet is Regular  Medications:   Patient is currently taking over-the-counter supplements  OTC medications include: see medication list  Patient is able to manage medications  Activities of Daily Living (ADLs)/Instrumental Activities of Daily Living (IADLs):   Walk and transfer into and out of bed and chair?: Yes  Dress and groom yourself?: Yes    Bathe or shower yourself?: Yes    Feed yourself? Yes  Do your laundry/housekeeping?: Yes  Manage your money, pay your bills and track your expenses?: Yes  Make your own meals?: Yes    Do your own shopping?: Yes    Previous Hospitalizations:   Any hospitalizations or ED visits within the last 12 months?: No      Advance Care Planning:   Living will: No    Durable POA for healthcare: No    Advanced directive: Yes    Advanced directive counseling given: Yes      Cognitive Screening:   Provider or family/friend/caregiver concerned regarding cognition?: No    PREVENTIVE SCREENINGS      Cardiovascular Screening:    General: Screening Not Indicated and History Lipid Disorder      Diabetes Screening:     General: Screening Not Indicated and History Diabetes      Colorectal Cancer Screening:     General: Risks and Benefits Discussed    Due for: Cologuard      Prostate Cancer Screening:    General: Screening Not Indicated      Osteoporosis Screening:    General: Risks and Benefits Discussed      Abdominal Aortic Aneurysm (AAA) Screening:    Risk factors include: age between 73-67 yo        General: Screening Not Indicated      Lung Cancer Screening:     General: Screening Not Indicated      Hepatitis C Screening:    General: Screening Current    Screening, Brief Intervention, and Referral to Treatment (SBIRT)    Screening  Typical number of drinks in a day: 0  Typical number of drinks in a week: 0  Interpretation: Low risk drinking behavior      Single Item Drug Screening:  How often have you used an illegal drug (including "marijuana) or a prescription medication for non-medical reasons in the past year? never    Single Item Drug Screen Score: 0  Interpretation: Negative screen for possible drug use disorder    Other Counseling Topics:   Car/seat belt/driving safety, skin self-exam and calcium and vitamin D intake and regular weightbearing exercise  No results found  Physical Exam:     /80 (BP Location: Left arm, Patient Position: Sitting, Cuff Size: Large)   Pulse 86   Temp 97 5 °F (36 4 °C) (Temporal)   Resp 18   Ht 5' 6 5\" (1 689 m)   Wt 115 kg (253 lb)   SpO2 97%   BMI 40 22 kg/m²     Physical Exam  Vitals and nursing note reviewed  Constitutional:       Appearance: Normal appearance  Comments: Morbidly obese   HENT:      Head: Normocephalic and atraumatic  Eyes:      Conjunctiva/sclera: Conjunctivae normal       Pupils: Pupils are equal, round, and reactive to light  Neck:      Vascular: No carotid bruit  Cardiovascular:      Rate and Rhythm: Normal rate and regular rhythm  Heart sounds: No murmur heard  Pulmonary:      Effort: Pulmonary effort is normal       Breath sounds: Normal breath sounds  Abdominal:      General: There is no distension  Palpations: Abdomen is soft  Tenderness: There is no abdominal tenderness  Musculoskeletal:      Cervical back: Normal range of motion and neck supple  Right lower leg: No edema  Left lower leg: No edema  Comments: Left shoulder exam: mild tenderness over AC joint  FROM  No joint effusion  Skin:     General: Skin is warm and dry  Findings: No rash  Neurological:      General: No focal deficit present  Mental Status: He is alert and oriented to person, place, and time  Motor: No weakness     Psychiatric:         Mood and Affect: Mood normal           Collette Dickens, MD  "

## 2023-06-13 NOTE — PATIENT INSTRUCTIONS
Medicare Preventive Visit Patient Instructions  Thank you for completing your Welcome to Medicare Visit or Medicare Annual Wellness Visit today  Your next wellness visit will be due in one year (6/13/2024)  The screening/preventive services that you may require over the next 5-10 years are detailed below  Some tests may not apply to you based off risk factors and/or age  Screening tests ordered at today's visit but not completed yet may show as past due  Also, please note that scanned in results may not display below  Preventive Screenings:  Service Recommendations Previous Testing/Comments   Colorectal Cancer Screening  · Colonoscopy    · Fecal Occult Blood Test (FOBT)/Fecal Immunochemical Test (FIT)  · Fecal DNA/Cologuard Test  · Flexible Sigmoidoscopy Age: 39-70 years old   Colonoscopy: every 10 years (May be performed more frequently if at higher risk)  OR  FOBT/FIT: every 1 year  OR  Cologuard: every 3 years  OR  Sigmoidoscopy: every 5 years  Screening may be recommended earlier than age 39 if at higher risk for colorectal cancer  Also, an individualized decision between you and your healthcare provider will decide whether screening between the ages of 74-80 would be appropriate   Colonoscopy: Not on file  FOBT/FIT: Not on file  Cologuard: Not on file  Sigmoidoscopy: Not on file          Prostate Cancer Screening Individualized decision between patient and health care provider in men between ages of 53-78   Medicare will cover every 12 months beginning on the day after your 50th birthday PSA: 1 1 ng/mL     Screening Not Indicated     Hepatitis C Screening Once for adults born between 1945 and 1965  More frequently in patients at high risk for Hepatitis C Hep C Antibody: 12/14/2016    Screening Current   Diabetes Screening 1-2 times per year if you're at risk for diabetes or have pre-diabetes Fasting glucose: 132 mg/dL (12/8/2022)  A1C: 6 9 (12/13/2022)  Screening Not Indicated  History Diabetes   Cholesterol Screening Once every 5 years if you don't have a lipid disorder  May order more often based on risk factors  Lipid panel: 12/08/2022  Screening Not Indicated  History Lipid Disorder      Other Preventive Screenings Covered by Medicare:  1  Abdominal Aortic Aneurysm (AAA) Screening: covered once if your at risk  You're considered to be at risk if you have a family history of AAA or a male between the age of 73-68 who smoking at least 100 cigarettes in your lifetime  2  Lung Cancer Screening: covers low dose CT scan once per year if you meet all of the following conditions: (1) Age 50-69; (2) No signs or symptoms of lung cancer; (3) Current smoker or have quit smoking within the last 15 years; (4) You have a tobacco smoking history of at least 20 pack years (packs per day x number of years you smoked); (5) You get a written order from a healthcare provider  3  Glaucoma Screening: covered annually if you're considered high risk: (1) You have diabetes OR (2) Family history of glaucoma OR (3)  aged 48 and older OR (3)  American aged 72 and older  3  Osteoporosis Screening: covered every 2 years if you meet one of the following conditions: (1) Have a vertebral abnormality; (2) On glucocorticoid therapy for more than 3 months; (3) Have primary hyperparathyroidism; (4) On osteoporosis medications and need to assess response to drug therapy  5  HIV Screening: covered annually if you're between the age of 12-76  Also covered annually if you are younger than 13 and older than 72 with risk factors for HIV infection  For pregnant patients, it is covered up to 3 times per pregnancy      Immunizations:  Immunization Recommendations   Influenza Vaccine Annual influenza vaccination during flu season is recommended for all persons aged >= 6 months who do not have contraindications   Pneumococcal Vaccine   * Pneumococcal conjugate vaccine = PCV13 (Prevnar 13), PCV15 (Vaxneuvance), PCV20 (Prevnar 20)  * Pneumococcal polysaccharide vaccine = PPSV23 (Pneumovax) Adults 2364 years old: 1-3 doses may be recommended based on certain risk factors  Adults 72 years old: 1-2 doses may be recommended based off what pneumonia vaccine you previously received   Hepatitis B Vaccine 3 dose series if at intermediate or high risk (ex: diabetes, end stage renal disease, liver disease)   Tetanus (Td) Vaccine - COST NOT COVERED BY MEDICARE PART B Following completion of primary series, a booster dose should be given every 10 years to maintain immunity against tetanus  Td may also be given as tetanus wound prophylaxis  Tdap Vaccine - COST NOT COVERED BY MEDICARE PART B Recommended at least once for all adults  For pregnant patients, recommended with each pregnancy  Shingles Vaccine (Shingrix) - COST NOT COVERED BY MEDICARE PART B  2 shot series recommended in those aged 48 and above     Health Maintenance Due:      Topic Date Due   • Colorectal Cancer Screening  Never done   • Hepatitis C Screening  Completed     Immunizations Due:      Topic Date Due   • COVID-19 Vaccine (3 - Moderna series) 12/07/2021     Advance Directives   What are advance directives? Advance directives are legal documents that state your wishes and plans for medical care  These plans are made ahead of time in case you lose your ability to make decisions for yourself  Advance directives can apply to any medical decision, such as the treatments you want, and if you want to donate organs  What are the types of advance directives? There are many types of advance directives, and each state has rules about how to use them  You may choose a combination of any of the following:  · Living will: This is a written record of the treatment you want  You can also choose which treatments you do not want, which to limit, and which to stop at a certain time  This includes surgery, medicine, IV fluid, and tube feedings     · Durable power of  for healthcare [facility]): This is a written record that states who you want to make healthcare choices for you when you are unable to make them for yourself  This person, called a proxy, is usually a family member or a friend  You may choose more than 1 proxy  · Do not resuscitate (DNR) order:  A DNR order is used in case your heart stops beating or you stop breathing  It is a request not to have certain forms of treatment, such as CPR  A DNR order may be included in other types of advance directives  · Medical directive: This covers the care that you want if you are in a coma, near death, or unable to make decisions for yourself  You can list the treatments you want for each condition  Treatment may include pain medicine, surgery, blood transfusions, dialysis, IV or tube feedings, and a ventilator (breathing machine)  · Values history: This document has questions about your views, beliefs, and how you feel and think about life  This information can help others choose the care that you would choose  Why are advance directives important? An advance directive helps you control your care  Although spoken wishes may be used, it is better to have your wishes written down  Spoken wishes can be misunderstood, or not followed  Treatments may be given even if you do not want them  An advance directive may make it easier for your family to make difficult choices about your care  Weight Management   Why it is important to manage your weight:  Being overweight increases your risk of health conditions such as heart disease, high blood pressure, type 2 diabetes, and certain types of cancer  It can also increase your risk for osteoarthritis, sleep apnea, and other respiratory problems  Aim for a slow, steady weight loss  Even a small amount of weight loss can lower your risk of health problems  How to lose weight safely:  A safe and healthy way to lose weight is to eat fewer calories and get regular exercise   You can lose up about 1 pound a week by decreasing the number of calories you eat by 500 calories each day  Healthy meal plan for weight management:  A healthy meal plan includes a variety of foods, contains fewer calories, and helps you stay healthy  A healthy meal plan includes the following:  · Eat whole-grain foods more often  A healthy meal plan should contain fiber  Fiber is the part of grains, fruits, and vegetables that is not broken down by your body  Whole-grain foods are healthy and provide extra fiber in your diet  Some examples of whole-grain foods are whole-wheat breads and pastas, oatmeal, brown rice, and bulgur  · Eat a variety of vegetables every day  Include dark, leafy greens such as spinach, kale, ayaz greens, and mustard greens  Eat yellow and orange vegetables such as carrots, sweet potatoes, and winter squash  · Eat a variety of fruits every day  Choose fresh or canned fruit (canned in its own juice or light syrup) instead of juice  Fruit juice has very little or no fiber  · Eat low-fat dairy foods  Drink fat-free (skim) milk or 1% milk  Eat fat-free yogurt and low-fat cottage cheese  Try low-fat cheeses such as mozzarella and other reduced-fat cheeses  · Choose meat and other protein foods that are low in fat  Choose beans or other legumes such as split peas or lentils  Choose fish, skinless poultry (chicken or turkey), or lean cuts of red meat (beef or pork)  Before you cook meat or poultry, cut off any visible fat  · Use less fat and oil  Try baking foods instead of frying them  Add less fat, such as margarine, sour cream, regular salad dressing and mayonnaise to foods  Eat fewer high-fat foods  Some examples of high-fat foods include french fries, doughnuts, ice cream, and cakes  · Eat fewer sweets  Limit foods and drinks that are high in sugar  This includes candy, cookies, regular soda, and sweetened drinks  Exercise:  Exercise at least 30 minutes per day on most days of the week   Some examples of exercise include walking, biking, dancing, and swimming  You can also fit in more physical activity by taking the stairs instead of the elevator or parking farther away from stores  Ask your healthcare provider about the best exercise plan for you  © Copyright [employer] 2018 Information is for End User's use only and may not be sold, redistributed or otherwise used for commercial purposes   All illustrations and images included in CareNotes® are the copyrighted property of A D A M , Inc  or [address] [place] [address]

## 2023-06-13 NOTE — ASSESSMENT & PLAN NOTE
BP on retake 136/80  Continue Losartan 100 mg -1/2 tablet daily, Metoprolol 25 mg daily, HCTZ 25 mg daily  Follow a low-sodium diet

## 2023-06-13 NOTE — ASSESSMENT & PLAN NOTE
Lab Results   Component Value Date    HGBA1C 7 5 (A) 06/13/2023     Stressed the importance of keeping blood sugar under control to prevent worsening neurological complications  Recommended podiatry evaluation

## 2023-06-13 NOTE — ASSESSMENT & PLAN NOTE
Continue Atorvastatin 20 mg daily  Check CMp, lipid panel as ordered in 12/2022  Reprinted script for blood test for patient

## 2023-06-14 ENCOUNTER — TELEPHONE (OUTPATIENT)
Dept: ADMINISTRATIVE | Facility: OTHER | Age: 73
End: 2023-06-14

## 2023-06-14 NOTE — LETTER
Diabetic Eye Exam Form    Date Requested: 06/15/23  Patient: Jason Jeong  Patient : [id number]   Referring Provider: David Perez MD      DIABETIC Eye Exam Date _______________________________      Type of Exam MUST be documented for Diabetic Eye Exams  Please CHECK ONE  Retinal Exam       Dilated Retinal Exam       OCT       Optomap-Iris Exam      Fundus Photography       Left Eye - Please check Retinopathy or No Retinopathy        Exam did show retinopathy    Exam did not show retinopathy       Right Eye - Please check Retinopathy or No Retinopathy       Exam did show retinopathy    Exam did not show retinopathy       Comments __________________________________________________________    Practice Providing Exam ______________________________________________    Exam Performed By (print name) _______________________________________      Provider Signature ___________________________________________________      These reports are needed for  compliance  Please fax this completed form and a copy of the Diabetic Eye Exam report to our office located at [place] [address] as soon as possible via Fax [phone] tommie Clemonsa: Phone [phone]  We thank you for your assistance in treating our mutual patient

## 2023-06-14 NOTE — TELEPHONE ENCOUNTER
----- Message from [facility] sent at 6/14/2023  8:02 AM EDT -----    ----- Message -----  From: Ty Hartley MD  Sent: 6/13/2023   6:17 PM EDT  To: [facility]    Please send a request to care gap team to obtain diabetic eye exam   Patient was seen by ophthalmology Dr Marlene Beauchamp

## 2023-06-15 NOTE — TELEPHONE ENCOUNTER
Upon review of the In Basket request and the patient's chart, initial outreach has been made via fax to facility  Please see Contacts section for details       Thank you  Eric Gonzalez MA

## 2023-06-19 NOTE — TELEPHONE ENCOUNTER
Upon review of the In Basket request we were able to locate, review, and update the patient chart as requested for Diabetic Eye Exam     Any additional questions or concerns should be emailed to the Practice Liaisons via the appropriate education email address, please do not reply via In Basket      Thank you  Harsh Foster MA

## 2023-07-07 ENCOUNTER — APPOINTMENT (OUTPATIENT)
Dept: LAB | Facility: CLINIC | Age: 73
End: 2023-07-07
Payer: MEDICARE

## 2023-07-07 DIAGNOSIS — E78.2 MIXED HYPERLIPIDEMIA: ICD-10-CM

## 2023-07-07 DIAGNOSIS — Z12.5 SCREENING FOR PROSTATE CANCER: ICD-10-CM

## 2023-07-07 DIAGNOSIS — N18.31 STAGE 3A CHRONIC KIDNEY DISEASE (HCC): ICD-10-CM

## 2023-07-07 DIAGNOSIS — E11.65 TYPE 2 DIABETES MELLITUS WITH HYPERGLYCEMIA, WITHOUT LONG-TERM CURRENT USE OF INSULIN (HCC): ICD-10-CM

## 2023-07-07 DIAGNOSIS — I10 BENIGN ESSENTIAL HYPERTENSION: ICD-10-CM

## 2023-07-07 DIAGNOSIS — K76.0 FATTY LIVER: ICD-10-CM

## 2023-07-07 LAB
ALBUMIN SERPL BCP-MCNC: 3.8 G/DL (ref 3.5–5)
ALP SERPL-CCNC: 72 U/L (ref 46–116)
ALT SERPL W P-5'-P-CCNC: 46 U/L (ref 12–78)
ANION GAP SERPL CALCULATED.3IONS-SCNC: 7 MMOL/L
AST SERPL W P-5'-P-CCNC: 37 U/L (ref 5–45)
BILIRUB SERPL-MCNC: 1.19 MG/DL (ref 0.2–1)
BUN SERPL-MCNC: 15 MG/DL (ref 5–25)
CALCIUM SERPL-MCNC: 10.1 MG/DL (ref 8.3–10.1)
CHLORIDE SERPL-SCNC: 107 MMOL/L (ref 96–108)
CHOLEST SERPL-MCNC: 145 MG/DL
CO2 SERPL-SCNC: 26 MMOL/L (ref 21–32)
CREAT SERPL-MCNC: 1.41 MG/DL (ref 0.6–1.3)
GFR SERPL CREATININE-BSD FRML MDRD: 48 ML/MIN/1.73SQ M
GLUCOSE P FAST SERPL-MCNC: 121 MG/DL (ref 65–99)
HDLC SERPL-MCNC: 53 MG/DL
LDLC SERPL CALC-MCNC: 61 MG/DL (ref 0–100)
NONHDLC SERPL-MCNC: 92 MG/DL
POTASSIUM SERPL-SCNC: 4.2 MMOL/L (ref 3.5–5.3)
PROT SERPL-MCNC: 8 G/DL (ref 6.4–8.4)
PSA SERPL-MCNC: 0.97 NG/ML (ref 0–4)
SODIUM SERPL-SCNC: 140 MMOL/L (ref 135–147)
TRIGL SERPL-MCNC: 156 MG/DL

## 2023-07-07 PROCEDURE — 80061 LIPID PANEL: CPT

## 2023-07-07 PROCEDURE — 36415 COLL VENOUS BLD VENIPUNCTURE: CPT

## 2023-07-07 PROCEDURE — G0103 PSA SCREENING: HCPCS

## 2023-07-07 PROCEDURE — 80053 COMPREHEN METABOLIC PANEL: CPT

## 2023-07-08 ENCOUNTER — APPOINTMENT (OUTPATIENT)
Dept: LAB | Facility: CLINIC | Age: 73
End: 2023-07-08
Payer: MEDICARE

## 2023-07-08 LAB
CREAT UR-MCNC: 169 MG/DL
MICROALBUMIN UR-MCNC: 14.6 MG/L (ref 0–20)
MICROALBUMIN/CREAT 24H UR: 9 MG/G CREATININE (ref 0–30)

## 2023-07-08 PROCEDURE — 82043 UR ALBUMIN QUANTITATIVE: CPT

## 2023-07-08 PROCEDURE — 82570 ASSAY OF URINE CREATININE: CPT

## 2023-07-17 ENCOUNTER — CONSULT (OUTPATIENT)
Dept: ENDOCRINOLOGY | Facility: CLINIC | Age: 73
End: 2023-07-17
Payer: MEDICARE

## 2023-07-17 VITALS
HEART RATE: 86 BPM | DIASTOLIC BLOOD PRESSURE: 86 MMHG | SYSTOLIC BLOOD PRESSURE: 146 MMHG | BODY MASS INDEX: 40.06 KG/M2 | WEIGHT: 255.2 LBS | HEIGHT: 67 IN

## 2023-07-17 DIAGNOSIS — E78.2 MIXED HYPERLIPIDEMIA: ICD-10-CM

## 2023-07-17 DIAGNOSIS — I10 PRIMARY HYPERTENSION: ICD-10-CM

## 2023-07-17 DIAGNOSIS — E11.42 DIABETIC POLYNEUROPATHY ASSOCIATED WITH TYPE 2 DIABETES MELLITUS (HCC): ICD-10-CM

## 2023-07-17 DIAGNOSIS — E11.65 TYPE 2 DIABETES MELLITUS WITH HYPERGLYCEMIA, WITHOUT LONG-TERM CURRENT USE OF INSULIN (HCC): Primary | ICD-10-CM

## 2023-07-17 PROCEDURE — 99204 OFFICE O/P NEW MOD 45 MIN: CPT | Performed by: INTERNAL MEDICINE

## 2023-07-17 RX ORDER — BLOOD-GLUCOSE METER
KIT MISCELLANEOUS SEE ADMIN INSTRUCTIONS
Qty: 1 KIT | Refills: 0 | Status: SHIPPED | OUTPATIENT
Start: 2023-07-17

## 2023-07-17 NOTE — PROGRESS NOTES
New Patient Consult Note      CC: Type 2 diabetes mellitus     Referring Provider  Con Dong Md  [facility],  [address] [facility]     History of Present Illness:   Nohemy Thurston is a 74 y.o. male with a history of type 2 diabetes who is referred by his PCP, Dr. Caitie Ingram, for further evaluation and management. His most recent HgbA1c is 7.5% on 6/13/23 with a urine M/C ratio of 9 on 7/8/23. His HgbA1c previously was 6.9% on 12/13/22. Recalls being diagnosed with diabetes a couple of years ago via abnormal labs. Initially treated with Metformin. Additional treatments included glimepiride which he currently takes. Reports microvascular complications of diabetic neuropathy. Denies recent illness or hospitalizations. Denies recent severe hypoglycemic or severe hyperglycemic episodes. Denies any issues with his current regimen. Current regimen:     Metformin 1000 mg twice daily with meals    Glimepiride 2 mg twice daily in the morning and at bedtime     Further diabetic ROS: endorses numbness and paresthesias of hands and feet, occasional edema of feet at end of day. He checks his home blood glucose occasionally using a OneTouch glucometer that is a few years old. He does check his fasting and dinnertime blood glucose on occasions, but he does not recall these values. His blood glucose before lunch tends to run in the 190s-220s. Hypoglycemic episodes: No  H/o of hypoglycemia causing hospitalization or Intervention such as glucagon injection or ambulance call: No   Hypoglycemia symptoms: No  Treatment of hypoglycemia: No    Diabetes education: No   Diet: 2-3 meals per day, 1-2 snacks per day.     Diabetic diet compliance:     Breakfast: Frosted Mini Wheats cereal, Italian bread toast, cookie, coffee   Lunch: sandwiches with white bread, fruit (nectarines, grapes)   Dinner: burgers, usually eats out/fast food ([employer] - sandwiches with fries), canned soup   Snacks: cookie, cauliflower Activity: walks around his house and at [address], limited by exertional dyspnea and low back pain     Opthamology: saw 2 months ago Dr. Ellen Ness, sees every 6 months; does not recall if he was told about retinopathy or macular edema  Podiatry: None  Dental: None, has dentures  Infuenza vaccine: 2022    Has hypertension: followed by PCP; on losartan 100 mg daily   Has hyperlipidemia: followed by PCP; on atorvastatin 20 mg daily - tolerating well, no myalgias.     Thyroid disorders: Low TSH in 2019 which has since resolved  History of pancreatitis: None  Family history: unsure of family history of DM or other endocrine disorders   Social history: denies tobacco, alcohol, or drug use; lives with his wife, used to work as a        Patient Active Problem List   Diagnosis   • Abnormal EKG   • Primary hypertension   • PVC (premature ventricular contraction)   • Elevated liver enzymes   • Exertional shortness of breath   • Fatty liver   • Glaucoma   • Mixed hyperlipidemia   • Morbid obesity due to excess calories ([address])   • Sinus tachycardia   • Type 2 diabetes mellitus with hyperglycemia, without long-term current use of insulin ([address])   • Vitamin D deficiency   • Hypoxia   • Medicare annual wellness visit, subsequent   • Diabetic neuropathy associated with type 2 diabetes mellitus ([address])   • Dizziness/Vertigo   • Low TSH level   • Chronic cough   • Hyperbilirubinemia   • Stage 3a chronic kidney disease ([address])   • Acute pain of left shoulder      Past Medical History:   Diagnosis Date   • Cataract, left     last assessed [id number]   • Epistaxis     last assessed [id number]   • Exertional shortness of breath     last assessed [id number]   • IFG (impaired fasting glucose)     last assessed 01Apr2016      Past Surgical History:   Procedure Laterality Date   • CATARACT EXTRACTION Left 07/01/2016    L eye cataract surgery - 7/16      Family History   Problem Relation Age of Onset   • Stomach cancer Father    • No Known Problems Mother      Social History     Tobacco Use   • Smoking status: Never   • Smokeless tobacco: Never   Substance Use Topics   • Alcohol use: No     No Known Allergies      Current Outpatient Medications:   •  aspirin 325 mg tablet, Take 1 tablet by mouth daily , Disp: , Rfl:   •  atorvastatin (LIPITOR) 20 mg tablet, Take 1 tablet (20 mg total) by mouth daily, Disp: 90 tablet, Rfl: 3  •  cholecalciferol (VITAMIN D3) 1,000 units tablet, Take 1 tablet by mouth daily, Disp: , Rfl:   •  glimepiride (AMARYL) 2 mg tablet, Take 1 tab. twice daily with meals, Disp: 60 tablet, Rfl: 5  •  glucose blood (ONE TOUCH ULTRA TEST) test strip, Test sugar 2-3 times daily, Disp: 300 each, Rfl: 3  •  glucose blood test strip, Use 1 each daily, Disp: 100 each, Rfl: 3  •  hydrochlorothiazide (HYDRODIURIL) 25 mg tablet, Take 1 tablet (25 mg total) by mouth daily, Disp: 90 tablet, Rfl: 3  •  latanoprost (XALATAN) 0.005 % ophthalmic solution, Apply to eye, Disp: , Rfl:   •  MicroarraysCAN UNISTIK II LANCETS MISC, by Does not apply route 3 (three) times a day for 90 days, Disp: 300 each, Rfl: 3  •  losartan (COZAAR) 100 MG tablet, Take 1/2 tab. daily, Disp: 45 tablet, Rfl: 3  •  meclizine (ANTIVERT) 12.5 MG tablet, TAKE 1 TABLET BY MOUTH EVERY 6 TO 8 HOURS AS NEEDED FOR DIZZINESS, Disp: 90 tablet, Rfl: 3  •  metFORMIN (GLUCOPHAGE) 1000 MG tablet, Take 1 tablet (1,000 mg total) by mouth 2 (two) times a day, Disp: 180 tablet, Rfl: 3  •  metoprolol tartrate (LOPRESSOR) 25 mg tablet, Take 1 tab. daily, Disp: 90 tablet, Rfl: 3  •  Multiple Vitamins-Minerals (MULTIVITAL-M) TABS, Take 1 tablet by mouth daily, Disp: , Rfl:   •  Omega-3 Fatty Acids (FISH OIL) 1,000 mg, Take by mouth 2 (two) times a day, Disp: , Rfl:   •  timolol (TIMOPTIC) 0.5 % ophthalmic solution, INSTILL 1 DROP INTO EACH EYE ONCE DAILY IN THE MORNING, Disp: , Rfl:   •  vitamin E, tocopherol, 400 units capsule, Take by mouth daily, Disp: , Rfl:      Review of Systems Constitutional: Negative for chills, fever and unexpected weight change. HENT: Negative for ear pain and sore throat. Eyes: Negative for pain and visual disturbance. Respiratory: Positive for shortness of breath (exertional dyspnea). Negative for cough. Cardiovascular: Negative for chest pain and palpitations. Gastrointestinal: Negative for abdominal pain and vomiting. Endocrine: Negative for polydipsia, polyphagia and polyuria. Genitourinary: Negative for dysuria and hematuria. Musculoskeletal: Negative for arthralgias and back pain. Occasional ankle edema   Skin: Negative for color change and rash. Neurological: Positive for numbness (of hands and feet). Negative for seizures and syncope. Paresthesias of hands and feet   Psychiatric/Behavioral: Negative for dysphoric mood. The patient is not nervous/anxious. All other systems reviewed and are negative. Physical Exam:  There is no height or weight on file to calculate BMI. There were no vitals taken for this visit. Wt Readings from Last 3 Encounters:   06/13/23 115 kg (253 lb)   12/13/22 117 kg (257 lb 6.4 oz)   08/10/22 115 kg (252 lb 12.8 oz)       Physical Exam  Vitals reviewed. Constitutional:       Appearance: Normal appearance. HENT:      Head: Normocephalic and atraumatic. Mouth/Throat:      Mouth: Mucous membranes are moist.      Pharynx: Oropharynx is clear. Eyes:      Extraocular Movements: Extraocular movements intact. Pupils: Pupils are equal, round, and reactive to light. Cardiovascular:      Rate and Rhythm: Normal rate and regular rhythm. Pulses: Normal pulses. no weak pulses          Dorsalis pedis pulses are 2+ on the right side and 2+ on the left side. Posterior tibial pulses are 2+ on the right side and 2+ on the left side. Pulmonary:      Effort: No respiratory distress. Breath sounds: Normal breath sounds.    Abdominal:      General: Bowel sounds are normal. There is no distension. Palpations: Abdomen is soft. Tenderness: There is no abdominal tenderness. Musculoskeletal:         General: No swelling. Cervical back: Normal range of motion and neck supple. No tenderness. Right lower leg: Edema (trace) present. Left lower leg: Edema (trace) present. Feet:      Right foot:      Skin integrity: Dry skin present. No ulcer, skin breakdown, erythema, warmth or callus. Toenail Condition: Right toenails are abnormally thick and long. Fungal disease present. Left foot:      Skin integrity: Dry skin present. No ulcer, skin breakdown, erythema, warmth or callus. Toenail Condition: Left toenails are abnormally thick and long. Fungal disease present. Lymphadenopathy:      Cervical: No cervical adenopathy. Skin:     General: Skin is warm and dry. Neurological:      General: No focal deficit present. Mental Status: He is alert and oriented to person, place, and time. Diabetic Foot Exam    Patient's shoes and socks removed. Right Foot/Ankle   Right Foot Inspection  Skin Exam: skin normal, skin intact and dry skin. No warmth, no callus, no erythema, no maceration, no abnormal color, no pre-ulcer, no ulcer and no callus. Toe Exam: ROM and strength within normal limits. Sensory   Vibration: intact  Proprioception: intact  Monofilament testing: intact    Vascular  Capillary refills: < 3 seconds  The right DP pulse is 2+. The right PT pulse is 2+. Left Foot/Ankle  Left Foot Inspection  Skin Exam: skin normal, skin intact and dry skin. No warmth, no erythema, no maceration, normal color, no pre-ulcer, no ulcer and no callus. Toe Exam: ROM and strength within normal limits. Sensory   Vibration: diminished  Proprioception: intact  Monofilament testing: intact    Vascular  Capillary refills: < 3 seconds  The left DP pulse is 2+. The left PT pulse is 2+.      Assign Risk Category  No deformity present  No loss of protective sensation  No weak pulses  Risk: 0    Labs:   Lab Results   Component Value Date    HGBA1C 7.5 (A) 06/13/2023    HGBA1C 6.9 (A) 12/13/2022    HGBA1C 6.7 (H) 08/05/2022     Lab Results   Component Value Date    GLUC 192 (H) 10/17/2019       Lab Results   Component Value Date    CREATININE 1.41 (H) 07/07/2023    CREATININE 1.17 12/08/2022    CREATININE 1.32 (H) 08/05/2022    BUN 15 07/07/2023    K 4.2 07/07/2023     07/07/2023    CO2 26 07/07/2023     eGFR   Date Value Ref Range Status   07/07/2023 48 ml/min/1.73sq m Final     Albumin Creat Ratio   Date Value Ref Range Status   07/08/2023 9 0 - 30 mg/g creatinine Final       Lab Results   Component Value Date    HDL 53 07/07/2023    TRIG 156 (H) 07/07/2023       Lab Results   Component Value Date    ALT 46 07/07/2023    AST 37 07/07/2023    ALKPHOS 72 07/07/2023       Lab Results   Component Value Date    FREET4 1.03 10/17/2019       Impression:  1. Type 2 diabetes mellitus with hyperglycemia, without long-term current use of insulin ([address])    2. Diabetic polyneuropathy associated with type 2 diabetes mellitus ([address])    3. Mixed hyperlipidemia    4.  Primary hypertension           Plan:  Diagnoses and all orders for this visit:    Type 2 diabetes mellitus with hyperglycemia, without long-term current use of insulin ([address])  - Currently not at goal  - Patient was counseled on diet control with instructions on introducing more vegetables, drinking more zero-calorie drinks such as water, limiting excess sugar intake, and replacing simple with complex carbohydrates  - Patient was also counseled on increasing his physical activity as tolerated with walking more for at least 30 minutes per day for at least 5 days per week  - Patient will continue to take his Metformin 1000 mg twice daily with meals and will continue taking glimepiride at its current dose of 2 mg twice daily at least 10 minutes before breakfast and dinner   - Will defer adding additional medications at this time and trial lifestyle modifications; if lifestyle modifications fail then will consider adding additional therapy  - Babita Parrish was not covered by his health insurance in the past, but he is not sure if other SGLT-2i are covered by his insurance   - Patient does not wish to take injectable therapy at this time   - He was prescribed a new OneTouch Ultra glucometer which he can use to check his blood glucose once daily in a staggered manner as described (before breakfast, before dinner, and before bedtime once daily) for the next two weeks; he will mail the office the log sheet for review   - Refer to diabetes educator for further education regarding lifestyle modifications to improve glycemic control   - He has been referred to Dr. Pilar Fish for podiatry evaluation by his PCP   Order:  -     Ambulatory Referral to Endocrinology    Diabetic polyneuropathy associated with type 2 diabetes mellitus ([address])  - Will try to optimize glycemic control with lifestyle modifications at this time and consider additional therapy if this step is ineffective  -     Ambulatory Referral to Endocrinology    Mixed hyperlipidemia  - Most recent lipid panel shows a LDL cholesterol of 61 on 7/7/23 which is at goal for him  - Continue atorvastatin 20 mg daily  - Continue regular follow-up with his PCP     Primary hypertension  - Continue ARB therapy with lisinopril 100 mg daily  - He is also taking HCTZ 25 mg daily which is prescribed by his PCP   - His urine M/C ratio was 9 on 7/8/23 and will be due again in 7/2024    Discussed with the patient and all questioned fully answered. He will call me if any problems arise. Counseled patient on diagnostic results, prognosis, risk and benefit of treatment options, instructions for management, importance of treatment compliance, and risk factor reduction and impressions.

## 2023-07-17 NOTE — PATIENT INSTRUCTIONS
- Please take glimepiride 2 mg 1 tablet twice a day around 10 minutes before breakfast and dinner  - Please take Metformin 1000 mg 1 tablet twice a day with breakfast and dinner   - A new OneTouch Ultra Mini blood glucose meter has been sent to your pharmacy; please pick this up and check your blood glucose one time daily before breakfast, before dinner, and before bedtime as directed on the blood glucose log sheet for the next two weeks   - Please e-mail, fax, or mail this sheet to the office so we can review this sheet   - Please see a diabetes educator to learn more about how you can improve your diet to lower your A1c and control your diabetes better  - In the meantime, cut back on white bread, potatoes, cookies, and sugary snacks and try to eat more vegetables and whole wheat/whole grain bread   - Please see a foot doctor for your foot care and toenail trimming

## 2023-08-01 DIAGNOSIS — I10 BENIGN ESSENTIAL HYPERTENSION: ICD-10-CM

## 2023-08-01 RX ORDER — LOSARTAN POTASSIUM 100 MG/1
TABLET ORAL
Qty: 45 TABLET | Refills: 3 | Status: SHIPPED | OUTPATIENT
Start: 2023-08-01

## 2023-09-05 DIAGNOSIS — E11.65 TYPE 2 DIABETES MELLITUS WITH HYPERGLYCEMIA, WITHOUT LONG-TERM CURRENT USE OF INSULIN (HCC): ICD-10-CM

## 2023-09-05 DIAGNOSIS — I10 PRIMARY HYPERTENSION: Primary | ICD-10-CM

## 2023-09-05 DIAGNOSIS — R79.89 ELEVATED SERUM CREATININE: ICD-10-CM

## 2023-09-05 RX ORDER — GLIMEPIRIDE 2 MG/1
TABLET ORAL
Qty: 60 TABLET | Refills: 5 | Status: SHIPPED | OUTPATIENT
Start: 2023-09-05

## 2023-09-21 DIAGNOSIS — I10 BENIGN ESSENTIAL HYPERTENSION: ICD-10-CM

## 2023-09-22 RX ORDER — HYDROCHLOROTHIAZIDE 25 MG/1
25 TABLET ORAL DAILY
Qty: 90 TABLET | Refills: 3 | Status: SHIPPED | OUTPATIENT
Start: 2023-09-22 | End: 2023-12-21

## 2023-09-28 ENCOUNTER — IOP CHECK (OUTPATIENT)
Dept: URBAN - METROPOLITAN AREA CLINIC 6 | Facility: CLINIC | Age: 73
End: 2023-09-28

## 2023-09-28 DIAGNOSIS — H40.1111: ICD-10-CM

## 2023-09-28 DIAGNOSIS — H40.1123: ICD-10-CM

## 2023-09-28 PROCEDURE — 92012 INTRM OPH EXAM EST PATIENT: CPT

## 2023-09-28 PROCEDURE — 92083 EXTENDED VISUAL FIELD XM: CPT

## 2023-09-28 ASSESSMENT — TONOMETRY
OD_IOP_MMHG: 26
OS_IOP_MMHG: 21

## 2023-09-28 ASSESSMENT — VISUAL ACUITY
OD_CC: 20/40
OS_CC: CF 1FT

## 2023-10-06 ENCOUNTER — APPOINTMENT (OUTPATIENT)
Dept: LAB | Facility: CLINIC | Age: 73
End: 2023-10-06
Payer: MEDICARE

## 2023-10-06 DIAGNOSIS — E11.65 TYPE 2 DIABETES MELLITUS WITH HYPERGLYCEMIA, WITHOUT LONG-TERM CURRENT USE OF INSULIN (HCC): ICD-10-CM

## 2023-10-06 DIAGNOSIS — I10 PRIMARY HYPERTENSION: ICD-10-CM

## 2023-10-06 DIAGNOSIS — R79.89 ELEVATED SERUM CREATININE: ICD-10-CM

## 2023-10-06 LAB
ANION GAP SERPL CALCULATED.3IONS-SCNC: 14 MMOL/L
BUN SERPL-MCNC: 18 MG/DL (ref 5–25)
CALCIUM SERPL-MCNC: 9.3 MG/DL (ref 8.4–10.2)
CHLORIDE SERPL-SCNC: 100 MMOL/L (ref 96–108)
CO2 SERPL-SCNC: 28 MMOL/L (ref 21–32)
CREAT SERPL-MCNC: 1.27 MG/DL (ref 0.6–1.3)
EST. AVERAGE GLUCOSE BLD GHB EST-MCNC: 160 MG/DL
GFR SERPL CREATININE-BSD FRML MDRD: 54 ML/MIN/1.73SQ M
GLUCOSE P FAST SERPL-MCNC: 162 MG/DL (ref 65–99)
HBA1C MFR BLD: 7.2 %
POTASSIUM SERPL-SCNC: 3.9 MMOL/L (ref 3.5–5.3)
SODIUM SERPL-SCNC: 142 MMOL/L (ref 135–147)

## 2023-10-06 PROCEDURE — 83036 HEMOGLOBIN GLYCOSYLATED A1C: CPT

## 2023-10-06 PROCEDURE — 80048 BASIC METABOLIC PNL TOTAL CA: CPT

## 2023-10-06 PROCEDURE — 36415 COLL VENOUS BLD VENIPUNCTURE: CPT

## 2023-10-09 ENCOUNTER — TELEPHONE (OUTPATIENT)
Dept: FAMILY MEDICINE CLINIC | Facility: CLINIC | Age: 73
End: 2023-10-09

## 2023-10-09 NOTE — TELEPHONE ENCOUNTER
Pt aware----- Message from Alexus Pruett MD sent at 10/9/2023  8:08 AM EDT -----  Please call patient. Kidney function test improved since July. Sodium, potassium - within normal range. Blood sugar 162. Recommend to continue current medications for diabetes, hypertension. Follow a low carb diet. Advised to stay well-hydrated,avoid NSAIDs.

## 2023-11-01 ENCOUNTER — IOP CHECK (OUTPATIENT)
Dept: URBAN - METROPOLITAN AREA CLINIC 6 | Facility: CLINIC | Age: 73
End: 2023-11-01

## 2023-11-01 DIAGNOSIS — H40.1111: ICD-10-CM

## 2023-11-01 DIAGNOSIS — H40.1123: ICD-10-CM

## 2023-11-01 PROCEDURE — 92012 INTRM OPH EXAM EST PATIENT: CPT

## 2023-11-01 PROCEDURE — 92020 GONIOSCOPY: CPT

## 2023-11-01 ASSESSMENT — TONOMETRY
OD_IOP_MMHG: 21
OS_IOP_MMHG: 19

## 2023-11-01 ASSESSMENT — VISUAL ACUITY
OS_CC: CF 1FT
OD_CC: 20/40

## 2023-12-04 DIAGNOSIS — E11.65 TYPE 2 DIABETES MELLITUS WITH HYPERGLYCEMIA, WITHOUT LONG-TERM CURRENT USE OF INSULIN (HCC): ICD-10-CM

## 2023-12-13 ENCOUNTER — RA CDI HCC (OUTPATIENT)
Dept: OTHER | Facility: HOSPITAL | Age: 73
End: 2023-12-13

## 2023-12-13 NOTE — PROGRESS NOTES
[address] [facility] coding opportunities          Chart Reviewed number of suggestions sent to Provider: 1     Patients Insurance   [id number]  Medicare Insurance: [employer]

## 2023-12-16 PROBLEM — N18.31 STAGE 3A CHRONIC KIDNEY DISEASE (HCC): Status: RESOLVED | Noted: 2022-08-10 | Resolved: 2023-12-16

## 2023-12-16 PROBLEM — N18.30 STAGE 3 CHRONIC KIDNEY DISEASE (HCC): Status: ACTIVE | Noted: 2022-08-10

## 2023-12-19 ENCOUNTER — OFFICE VISIT (OUTPATIENT)
Dept: FAMILY MEDICINE CLINIC | Facility: CLINIC | Age: 73
End: 2023-12-19
Payer: MEDICARE

## 2023-12-19 VITALS
WEIGHT: 251 LBS | SYSTOLIC BLOOD PRESSURE: 140 MMHG | BODY MASS INDEX: 39.39 KG/M2 | TEMPERATURE: 98.7 F | HEART RATE: 89 BPM | DIASTOLIC BLOOD PRESSURE: 78 MMHG | RESPIRATION RATE: 18 BRPM | OXYGEN SATURATION: 98 % | HEIGHT: 67 IN

## 2023-12-19 DIAGNOSIS — E66.01 MORBID OBESITY DUE TO EXCESS CALORIES (HCC): ICD-10-CM

## 2023-12-19 DIAGNOSIS — E78.2 MIXED HYPERLIPIDEMIA: ICD-10-CM

## 2023-12-19 DIAGNOSIS — E11.42 DIABETIC POLYNEUROPATHY ASSOCIATED WITH TYPE 2 DIABETES MELLITUS (HCC): ICD-10-CM

## 2023-12-19 DIAGNOSIS — M79.605 LEFT LEG PAIN: ICD-10-CM

## 2023-12-19 DIAGNOSIS — I10 PRIMARY HYPERTENSION: Primary | ICD-10-CM

## 2023-12-19 DIAGNOSIS — K76.0 FATTY LIVER: ICD-10-CM

## 2023-12-19 DIAGNOSIS — Z12.11 SCREENING FOR COLON CANCER: ICD-10-CM

## 2023-12-19 DIAGNOSIS — N18.31 STAGE 3A CHRONIC KIDNEY DISEASE (HCC): ICD-10-CM

## 2023-12-19 DIAGNOSIS — Z23 ENCOUNTER FOR IMMUNIZATION: ICD-10-CM

## 2023-12-19 DIAGNOSIS — E11.65 TYPE 2 DIABETES MELLITUS WITH HYPERGLYCEMIA, WITHOUT LONG-TERM CURRENT USE OF INSULIN (HCC): ICD-10-CM

## 2023-12-19 PROBLEM — M25.512 ACUTE PAIN OF LEFT SHOULDER: Status: RESOLVED | Noted: 2023-06-13 | Resolved: 2023-12-19

## 2023-12-19 PROCEDURE — 99214 OFFICE O/P EST MOD 30 MIN: CPT | Performed by: FAMILY MEDICINE

## 2023-12-19 PROCEDURE — G0008 ADMIN INFLUENZA VIRUS VAC: HCPCS

## 2023-12-19 PROCEDURE — 90662 IIV NO PRSV INCREASED AG IM: CPT

## 2023-12-19 RX ORDER — DORZOLAMIDE HCL 20 MG/ML
SOLUTION/ DROPS OPHTHALMIC
COMMUNITY
Start: 2023-11-01

## 2023-12-19 NOTE — PROGRESS NOTES
Name: Kale Clay      : 1950      MRN: [id number]  Encounter Provider: Kathy Orozco MD  Encounter Date: 2023   Encounter department: [facility]    Chief Complaint   Patient presents with    Follow-up     6 month     Pain     Leg and feet      Health Maintenance   Topic Date Due    Colorectal Cancer Screening  Never done    COVID-19 Vaccine (3 - 2023-24 season) 2023    Depression Screening  2024    BMI: Followup Plan  2024    HEMOGLOBIN A1C  2024    Fall Risk  2024    Medicare Annual Wellness Visit (AWV)  2024    Kidney Health Evaluation: Albumin/Creatinine Ratio  2024    BMI: Adult  2024    Diabetic Foot Exam  2024    Kidney Health Evaluation: GFR  10/06/2024    DM Eye Exam  2025    Hepatitis C Screening  Completed    Pneumococcal Vaccine: 65+ Years  Completed    Influenza Vaccine  Completed    HIB Vaccine  Aged Out    IPV Vaccine  Aged Out    Hepatitis A Vaccine  Aged Out    Meningococcal ACWY Vaccine  Aged Out    HPV Vaccine  Aged Out       Assessment & Plan     1. Primary hypertension  Assessment & Plan:  BP on retake 140/78.  Continue Losartan 100 mg -1/2 tablet daily, Metoprolol 25 mg 1 tablet daily, HCTZ 25 mg daily.  Recommended to follow a low-sodium diet, work on weight loss.    Patient continues with exertional SOB, dizziness.  Recommended to schedule echocardiogram.    Orders:  -     Comprehensive metabolic panel; Future; Expected date: 2024  -     Echo complete w/ contrast if indicated; Future; Expected date: 2023    2. Type 2 diabetes mellitus with hyperglycemia, without long-term current use of insulin ([facility])  Assessment & Plan:    Lab Results   Component Value Date    HGBA1C 7.2 (H) 10/06/2023     Hb A1C goal < 7.0.  Currently taking Metformin 1000 mg 1 tablet twice daily with meals, Glimepiride 2 mg twice daily.    Patient's medical insurance did not cover Jardiance and Januvia.   Reports elevated blood sugar readings at home around 180 - 200.  Follow-up with endocrinology on December 21, 2023 to discuss medical therapy, obtaining a new glucometer.    Check exam by ophthalmology Dr. Jamil.  Recommended to schedule appointment with podiatrist.    Orders:  -     Comprehensive metabolic panel; Future; Expected date: 04/14/2024  -     Albumin / creatinine urine ratio; Future; Expected date: 04/14/2024  -     Hemoglobin A1C; Future    3. Diabetic polyneuropathy associated with type 2 diabetes mellitus (HCC)  Assessment & Plan:    Lab Results   Component Value Date    HGBA1C 7.2 (H) 10/06/2023     Discussed importance of keeping blood sugar under better control to prevent worsening neurological complications.    Schedule appointment with podiatry.      4. Stage 3a chronic kidney disease (HCC)  Assessment & Plan:  Lab Results   Component Value Date    EGFR 54 10/06/2023    EGFR 48 07/07/2023    EGFR 61 12/08/2022    CREATININE 1.27 10/06/2023    CREATININE 1.41 (H) 07/07/2023    CREATININE 1.17 12/08/2022     Advised patient to stop taking Ibuprofen.    Take Tylenol if needed for joint pain.    Stay well-hydrated.    Will continue to monitor labs.    Orders:  -     Comprehensive metabolic panel; Future; Expected date: 04/14/2024    5. Morbid obesity due to excess calories (HCC)  Assessment & Plan:  Weight remains stable.    Recommended to work on dietary and lifestyle modifications, losing weight.      6. Mixed hyperlipidemia  Assessment & Plan:  Continue Atorvastatin 20 mg daily.  Check lipid panel.        Orders:  -     Comprehensive metabolic panel; Future; Expected date: 04/14/2024  -     Lipid Panel with Direct LDL reflex; Future; Expected date: 04/14/2024    7. Fatty liver  Assessment & Plan:  Recommended to follow a low-fat diet, work on weight reduction.    Continue Vit E 400 IU daily.    Orders:  -     Comprehensive metabolic panel; Future; Expected date: 04/14/2024    8. Encounter for  immunization  -     influenza vaccine, high-dose, PF 0.7 mL (FLUZONE HIGH-DOSE)    9. Left leg pain  Assessment & Plan:  Patient reports pain in left lower leg at night.    Denies pain in left leg / left calf with walking.  No left calf tenderness, no left leg edema.  Recommended stretching exercises, take Tylenol 650 mg at bedtime.  Call office if symptoms persist or worsen.          10. Screening for colon cancer  -     Occult Blood, Fecal Immunochemical; Future           Schedule follow-up visit, medicare AWV in 6 months.      Subjective      HPI    Patient presents for 6 month follow-up visit.    PMHx: HTN, Type 2 DM with diabetic neuropathy, Hyperlipidemia, Morbid obesity, Fatty liver, CKD stage 3.     Reviewed current medications, blood test results from October 2023.    Fasting blood sugar 162, Hb A1C 7.2.    Creatinine 1.27 ( improved from 7/23), GFR 54.      HTN - patient denies chest pain.  Continues with stable exertional shortness of breath.    Currently taking Losartan 100 mg -1/2 tablet daily, Metoprolol 25 mg daily, HCTZ 25 mg daily.    Patient takes Meclizine 12.5 mg daily in the morning for dizziness.      Type 2 DM - followed by [facility] endocrinology, was seen by Dr. Montalvo in July 2023.    Currently taking Metformin 1000 mg twice daily, Glimepiride  2 mg 1 tablet twice daily.    Patient's medical insurance did not cover Jardiance and Januvia.    Reports elevated blood sugar at home around 180 -200.  He is not sure if his glucometer works properly.    Follow-up visit scheduled with endocrinology on December 21, 2023, will discuss medical therapy and getting a new glucometer.    Hyperlipidemia - on Atorvastatin 20 mg daily.  .  L LEC/o numbness in feet, left lower leg pain at night.  Pain in L LE improves in the morning with walking.  He takes Ibuprofen at bedtime with relief in symptoms.    Reports no left leg injury.    Declined scheduling colonoscopy, Cologard testing.  Reports no family  history of colon cancer.    Review of Systems   Constitutional:  Positive for fatigue. Negative for activity change, appetite change, chills and fever.   HENT:  Negative for congestion, ear pain, hearing loss, sore throat and trouble swallowing.    Eyes:  Positive for visual disturbance. Negative for pain, discharge, redness (decreased/ blurred vision in left eye) and itching.   Respiratory:  Positive for shortness of breath. Negative for cough (stable exertional SOB), chest tightness and wheezing.    Cardiovascular:  Negative for chest pain, palpitations and leg swelling.   Gastrointestinal:  Negative for abdominal pain, blood in stool, constipation, diarrhea, nausea and vomiting.   Genitourinary:  Negative for difficulty urinating, dysuria and hematuria.        Nocturia x 1-2   Musculoskeletal:  Positive for arthralgias and gait problem (balance problem). Negative for back pain and joint swelling.        Pain in left lower leg   Skin:  Negative for rash.   Neurological:  Positive for dizziness and numbness (in feet). Negative for syncope and headaches.   Hematological: Negative.    Psychiatric/Behavioral:  Negative for dysphoric mood and sleep disturbance. The patient is not nervous/anxious.        Current Outpatient Medications on File Prior to Visit   Medication Sig    aspirin 325 mg tablet Take 1 tablet by mouth daily     Blood Glucose Monitoring Suppl (ONE TOUCH ULTRA MINI) w/Device KIT Use see administration instructions Check your blood glucose 1 time per day as directed    cholecalciferol (VITAMIN D3) 1,000 units tablet Take 1 tablet by mouth daily    dorzolamide (TRUSOPT) 2 % ophthalmic solution INSTILL 1 DROPS INTO EACH EYE TWICE DAILY    glimepiride (AMARYL) 2 mg tablet Take 1 tab. twice daily with meals    glucose blood (ONE TOUCH ULTRA TEST) test strip Test sugar 2-3 times daily    glucose blood test strip Use 1 each daily    hydrochlorothiazide (HYDRODIURIL) 25 mg tablet Take 1 tablet (25 mg total) by  "mouth daily    latanoprost (XALATAN) 0.005 % ophthalmic solution Apply to eye    losartan (COZAAR) 100 MG tablet Take 1/2 tab. daily    meclizine (ANTIVERT) 12.5 MG tablet TAKE 1 TABLET BY MOUTH EVERY 6 TO 8 HOURS AS NEEDED FOR DIZZINESS    metFORMIN (GLUCOPHAGE) 1000 MG tablet Take 1 tablet by mouth twice daily    metoprolol tartrate (LOPRESSOR) 25 mg tablet Take 1 tab. daily    Multiple Vitamins-Minerals (MULTIVITAL-M) TABS Take 1 tablet by mouth daily    Omega-3 Fatty Acids (FISH OIL) 1,000 mg Take by mouth 2 (two) times a day    timolol (TIMOPTIC) 0.5 % ophthalmic solution INSTILL 1 DROP INTO EACH EYE ONCE DAILY IN THE MORNING    vitamin E, tocopherol, 400 units capsule Take by mouth daily    atorvastatin (LIPITOR) 20 mg tablet Take 1 tablet (20 mg total) by mouth daily    TagArray UNISTIK II LANCETS MISC by Does not apply route 3 (three) times a day for 90 days       Objective     /78 (BP Location: Left arm, Patient Position: Sitting, Cuff Size: Standard)   Pulse 89   Temp 98.7 °F (37.1 °C) (Tympanic)   Resp 18   Ht 5' 6.5\" (1.689 m)   Wt 114 kg (251 lb)   SpO2 98%   BMI 39.91 kg/m²     Physical Exam  Vitals and nursing note reviewed.   Constitutional:       Appearance: Normal appearance.      Comments: Morbidly obese   HENT:      Head: Normocephalic and atraumatic.   Eyes:      Conjunctiva/sclera: Conjunctivae normal.      Pupils: Pupils are equal, round, and reactive to light.   Neck:      Vascular: No carotid bruit.   Cardiovascular:      Rate and Rhythm: Regular rhythm. Tachycardia present.      Heart sounds: Murmur heard.   Pulmonary:      Effort: Pulmonary effort is normal.      Breath sounds: Normal breath sounds.   Abdominal:      General: Bowel sounds are normal. There is no distension.      Palpations: Abdomen is soft.      Tenderness: There is no abdominal tenderness.   Musculoskeletal:      Cervical back: Normal range of motion and neck supple.      Right lower leg: No edema.      Left " lower leg: No edema.      Comments: L LE : no swelling.  No calf tenderness   Skin:     General: Skin is warm and dry.      Findings: No rash.   Neurological:      General: No focal deficit present.      Mental Status: He is alert. Mental status is at baseline.   Psychiatric:         Mood and Affect: Mood normal.       Kathy Orozco MD

## 2023-12-20 ENCOUNTER — TELEPHONE (OUTPATIENT)
Dept: ADMINISTRATIVE | Facility: OTHER | Age: 73
End: 2023-12-20

## 2023-12-20 NOTE — ASSESSMENT & PLAN NOTE
Lab Results   Component Value Date    HGBA1C 7.2 (H) 10/06/2023     Discussed importance of keeping blood sugar under better control to prevent worsening neurological complications.    Schedule appointment with podiatry.

## 2023-12-20 NOTE — ASSESSMENT & PLAN NOTE
BP on retake 140/78.  Continue Losartan 100 mg -1/2 tablet daily, Metoprolol 25 mg 1 tablet daily, HCTZ 25 mg daily.  Recommended to follow a low-sodium diet, work on weight loss.    Patient continues with exertional SOB, dizziness.  Recommended to schedule echocardiogram.

## 2023-12-20 NOTE — ASSESSMENT & PLAN NOTE
Weight remains stable.    Recommended to work on dietary and lifestyle modifications, losing weight.

## 2023-12-20 NOTE — TELEPHONE ENCOUNTER
Upon review of the In Basket request we  found the HM already up to date with the requested item    Any additional questions or concerns should be emailed to the Practice Liaisons via the appropriate education email address, please do not reply via In Basket.    Thank you  Carlos Bynum MA

## 2023-12-20 NOTE — ASSESSMENT & PLAN NOTE
Lab Results   Component Value Date    HGBA1C 7.2 (H) 10/06/2023     Hb A1C goal < 7.0.  Currently taking Metformin 1000 mg 1 tablet twice daily with meals, Glimepiride 2 mg twice daily.    Patient's medical insurance did not cover Jardiance and Kitsy Laneuvia.  Reports elevated blood sugar readings at home around 180 - 200.  Follow-up with endocrinology on December 21, 2023 to discuss medical therapy, obtaining a new glucometer.    Check exam by ophthalmology Dr. Jamil.  Recommended to schedule appointment with podiatrist.

## 2023-12-20 NOTE — ASSESSMENT & PLAN NOTE
Lab Results   Component Value Date    EGFR 54 10/06/2023    EGFR 48 07/07/2023    EGFR 61 12/08/2022    CREATININE 1.27 10/06/2023    CREATININE 1.41 (H) 07/07/2023    CREATININE 1.17 12/08/2022     Advised patient to stop taking Ibuprofen.    Take Tylenol if needed for joint pain.    Stay well-hydrated.    Will continue to monitor labs.

## 2023-12-20 NOTE — ASSESSMENT & PLAN NOTE
Patient reports pain in left lower leg at night.    Denies pain in left leg / left calf with walking.  No left calf tenderness, no left leg edema.  Recommended stretching exercises, take Tylenol 650 mg at bedtime.  Call office if symptoms persist or worsen.

## 2023-12-20 NOTE — TELEPHONE ENCOUNTER
----- Message from Nay Ivy sent at 12/20/2023  9:17 AM EST -----  Regarding: care gap request  12/20/23 9:17 AM    Hello, our patient attached above has had Diabetic Eye Exam completed/performed. Please assist in updating the patient chart by making an External outreach to Dr. Jamil facility located in [place]. The date of service is in 2023.    Thank you,  Nay Ivy  [facility]

## 2023-12-21 ENCOUNTER — OFFICE VISIT (OUTPATIENT)
Dept: ENDOCRINOLOGY | Facility: CLINIC | Age: 73
End: 2023-12-21
Payer: MEDICARE

## 2023-12-21 VITALS
WEIGHT: 251.8 LBS | SYSTOLIC BLOOD PRESSURE: 136 MMHG | OXYGEN SATURATION: 98 % | HEIGHT: 67 IN | BODY MASS INDEX: 39.52 KG/M2 | DIASTOLIC BLOOD PRESSURE: 82 MMHG | HEART RATE: 85 BPM

## 2023-12-21 DIAGNOSIS — E11.65 TYPE 2 DIABETES MELLITUS WITH HYPERGLYCEMIA, WITHOUT LONG-TERM CURRENT USE OF INSULIN (HCC): Primary | ICD-10-CM

## 2023-12-21 DIAGNOSIS — E78.2 MIXED HYPERLIPIDEMIA: ICD-10-CM

## 2023-12-21 DIAGNOSIS — I10 PRIMARY HYPERTENSION: ICD-10-CM

## 2023-12-21 PROCEDURE — 99214 OFFICE O/P EST MOD 30 MIN: CPT

## 2023-12-21 NOTE — ASSESSMENT & PLAN NOTE
Stable for age. Continue current regimen. Update lab work prior to next visit.     Lab Results   Component Value Date    HGBA1C 7.2 (H) 10/06/2023

## 2023-12-21 NOTE — PROGRESS NOTES
"Established Patient Progress Note    CC: Follow up for type 2 diabetes mellitus    Impression & Plan:    Problem List Items Addressed This Visit          Endocrine    Type 2 diabetes mellitus with hyperglycemia, without long-term current use of insulin (HCC) - Primary     Stable for age. Continue current regimen. Update lab work prior to next visit.     Lab Results   Component Value Date    HGBA1C 7.2 (H) 10/06/2023               Cardiovascular and Mediastinum    Primary hypertension     Stable in office. Continue to monitor.            Other    Mixed hyperlipidemia     Update lab work prior to next visit. Continue with statin.            No orders of the defined types were placed in this encounter.      History of Present Illness:     Kale Clay is a 73 y.o. male with a history of type 2 diabetes, hypertension, hyperlipidemia, CKD. Complications: neuropathy. Last A1C was 7.2. Home glucose monitoring: home glucose monitor, checks sporadically     Hypoglycemia: denies  Recent hospitalizations or illnesses: no  Problems with current regimen: no     Home blood glucose readings: checks every couple days, reports 170-200, sporadic, reports usually in afternoon 2 hours after breakfast      Current regimen:   Glimepiride 2 mg BID  Metformin 1,000 mg BID    Last Eye Exam: UTD, 4/20/23  Last Foot Exam: UTD, 7/17/23    ACE/ARB: losartan  Has hyperlipidemia: Taking atorvastatin      Reports he is having ECHO next week. Has had murmur \"for a long time\". He does report SOB on exertion. Does not see cardiology. Managed by PCP for now. On HCTZ. No lower extremity swelling on exam.    Patient Active Problem List   Diagnosis    Abnormal EKG    Primary hypertension    PVC (premature ventricular contraction)    Elevated liver enzymes    Exertional shortness of breath    Fatty liver    Glaucoma    Mixed hyperlipidemia    Morbid obesity due to excess calories (HCC)    Sinus tachycardia    Type 2 diabetes mellitus with hyperglycemia, " without long-term current use of insulin (HCC)    Vitamin D deficiency    Hypoxia    Medicare annual wellness visit, subsequent    Diabetic neuropathy associated with type 2 diabetes mellitus (HCC)    Dizziness/Vertigo    Low TSH level    Chronic cough    Hyperbilirubinemia    Stage 3 chronic kidney disease (HCC)    Left leg pain      Past Medical History:   Diagnosis Date    Cataract, left     last assessed [id number]    Epistaxis     last assessed 16Jan2017    Exertional shortness of breath     last assessed 16jan2018    IFG (impaired fasting glucose)     last assessed 01Apr2016      Past Surgical History:   Procedure Laterality Date    CATARACT EXTRACTION Left 07/01/2016    L eye cataract surgery - 7/16      Family History   Problem Relation Age of Onset    Stomach cancer Father     No Known Problems Mother      Social History     Tobacco Use    Smoking status: Never     Passive exposure: Never    Smokeless tobacco: Never   Substance Use Topics    Alcohol use: No     No Known Allergies      Current Outpatient Medications:     aspirin 325 mg tablet, Take 1 tablet by mouth daily , Disp: , Rfl:     Blood Glucose Monitoring Suppl (ONE TOUCH ULTRA MINI) w/Device KIT, Use see administration instructions Check your blood glucose 1 time per day as directed, Disp: 1 kit, Rfl: 0    cholecalciferol (VITAMIN D3) 1,000 units tablet, Take 1 tablet by mouth daily, Disp: , Rfl:     dorzolamide (TRUSOPT) 2 % ophthalmic solution, INSTILL 1 DROPS INTO EACH EYE TWICE DAILY, Disp: , Rfl:     glimepiride (AMARYL) 2 mg tablet, Take 1 tab. twice daily with meals, Disp: 60 tablet, Rfl: 5    glucose blood (ONE TOUCH ULTRA TEST) test strip, Test sugar 2-3 times daily, Disp: 300 each, Rfl: 3    glucose blood test strip, Use 1 each daily, Disp: 100 each, Rfl: 3    hydrochlorothiazide (HYDRODIURIL) 25 mg tablet, Take 1 tablet (25 mg total) by mouth daily, Disp: 90 tablet, Rfl: 3    latanoprost (XALATAN) 0.005 % ophthalmic solution, Apply to  "eye, Disp: , Rfl:     losartan (COZAAR) 100 MG tablet, Take 1/2 tab. daily, Disp: 45 tablet, Rfl: 3    meclizine (ANTIVERT) 12.5 MG tablet, TAKE 1 TABLET BY MOUTH EVERY 6 TO 8 HOURS AS NEEDED FOR DIZZINESS, Disp: 90 tablet, Rfl: 3    metFORMIN (GLUCOPHAGE) 1000 MG tablet, Take 1 tablet by mouth twice daily, Disp: 180 tablet, Rfl: 3    metoprolol tartrate (LOPRESSOR) 25 mg tablet, Take 1 tab. daily, Disp: 90 tablet, Rfl: 3    Multiple Vitamins-Minerals (MULTIVITAL-M) TABS, Take 1 tablet by mouth daily, Disp: , Rfl:     Omega-3 Fatty Acids (FISH OIL) 1,000 mg, Take by mouth 2 (two) times a day, Disp: , Rfl:     timolol (TIMOPTIC) 0.5 % ophthalmic solution, INSTILL 1 DROP INTO EACH EYE ONCE DAILY IN THE MORNING, Disp: , Rfl:     vitamin E, tocopherol, 400 units capsule, Take by mouth daily, Disp: , Rfl:     atorvastatin (LIPITOR) 20 mg tablet, Take 1 tablet (20 mg total) by mouth daily, Disp: 90 tablet, Rfl: 3    StupeflixCAN UNISTIK II LANCETS MISC, by Does not apply route 3 (three) times a day for 90 days, Disp: 300 each, Rfl: 3    Review of Systems   Constitutional:  Negative for chills and fever.   HENT:  Negative for ear pain and sore throat.    Eyes:  Negative for pain and visual disturbance.   Respiratory:  Positive for shortness of breath. Negative for cough.         With exertion   Cardiovascular:  Negative for chest pain and palpitations.   Gastrointestinal:  Negative for abdominal pain and vomiting.   Genitourinary:  Negative for dysuria and hematuria.   Musculoskeletal:  Negative for arthralgias and back pain.   Skin:  Negative for color change and rash.   Neurological:  Negative for seizures and syncope.   All other systems reviewed and are negative.      Physical Exam:  Body mass index is 40.03 kg/m².  /82 (BP Location: Left arm, Patient Position: Sitting, Cuff Size: Adult)   Pulse 85   Ht 5' 6.5\" (1.689 m)   Wt 114 kg (251 lb 12.8 oz)   SpO2 98%   BMI 40.03 kg/m²    Wt Readings from Last 3 " Encounters:   12/21/23 114 kg (251 lb 12.8 oz)   12/19/23 114 kg (251 lb)   07/17/23 116 kg (255 lb 3.2 oz)       Physical Exam  Vitals reviewed.   Constitutional:       Appearance: Normal appearance.   HENT:      Head: Normocephalic and atraumatic.   Cardiovascular:      Rate and Rhythm: Normal rate.      Heart sounds: Normal heart sounds.   Pulmonary:      Effort: Pulmonary effort is normal.      Breath sounds: Rales present.      Comments: Bilateral bases  Neurological:      Mental Status: He is alert and oriented to person, place, and time.   Psychiatric:         Mood and Affect: Mood normal.         Behavior: Behavior normal.       Diabetic Foot Exam    Labs:   Lab Results   Component Value Date    HGBA1C 7.2 (H) 10/06/2023    HGBA1C 7.5 (A) 06/13/2023    HGBA1C 6.9 (A) 12/13/2022     Lab Results   Component Value Date    CREATININE 1.27 10/06/2023    CREATININE 1.41 (H) 07/07/2023    CREATININE 1.17 12/08/2022    BUN 18 10/06/2023    K 3.9 10/06/2023     10/06/2023    CO2 28 10/06/2023     eGFR   Date Value Ref Range Status   10/06/2023 54 ml/min/1.73sq m Final     Lab Results   Component Value Date    HDL 53 07/07/2023    TRIG 156 (H) 07/07/2023     Lab Results   Component Value Date    ALT 46 07/07/2023    AST 37 07/07/2023    ALKPHOS 72 07/07/2023     Lab Results   Component Value Date    EKH6COTICOVA 1.140 11/04/2020    QSN4ULZVVPGS 1.320 11/12/2019    APD4KDSPNYVU 0.340 (L) 10/17/2019     Lab Results   Component Value Date    FREET4 1.03 10/17/2019         There are no Patient Instructions on file for this visit.      Discussed with the patient and all questioned fully answered. He will call me if any problems arise.

## 2024-01-04 ENCOUNTER — HOSPITAL ENCOUNTER (OUTPATIENT)
Dept: NON INVASIVE DIAGNOSTICS | Facility: CLINIC | Age: 74
Discharge: HOME | End: 2024-01-04
Payer: MEDICARE

## 2024-01-04 VITALS
WEIGHT: 251 LBS | BODY MASS INDEX: 40.34 KG/M2 | SYSTOLIC BLOOD PRESSURE: 136 MMHG | HEART RATE: 85 BPM | HEIGHT: 66 IN | DIASTOLIC BLOOD PRESSURE: 82 MMHG

## 2024-01-04 DIAGNOSIS — I10 PRIMARY HYPERTENSION: ICD-10-CM

## 2024-01-04 LAB
AORTIC ROOT: 4 CM
AORTIC VALVE MEAN VELOCITY: 15.5 M/S
APICAL FOUR CHAMBER EJECTION FRACTION: 73 %
ASCENDING AORTA: 3.9 CM
AV AREA BY CONTINUOUS VTI: 1.4 CM2
AV AREA PEAK VELOCITY: 1.5 CM2
AV LVOT MEAN GRADIENT: 1 MMHG
AV LVOT PEAK GRADIENT: 3 MMHG
AV MEAN GRADIENT: 10 MMHG
AV PEAK GRADIENT: 14 MMHG
AV VALVE AREA: 1.41 CM2
AV VELOCITY RATIO: 0.47
DOP CALC AO PEAK VEL: 1.87 M/S
DOP CALC AO VTI: 37.2 CM
DOP CALC LVOT AREA: 3.14 CM2
DOP CALC LVOT CARDIAC INDEX: 2.05 L/MIN/M2
DOP CALC LVOT CARDIAC OUTPUT: 4.54 L/MIN
DOP CALC LVOT DIAMETER: 2 CM
DOP CALC LVOT PEAK VEL VTI: 16.72 CM
DOP CALC LVOT PEAK VEL: 0.87 M/S
DOP CALC LVOT STROKE INDEX: 23 ML/M2
DOP CALC LVOT STROKE VOLUME: 52.5
E WAVE DECELERATION TIME: 209 MS
E/A RATIO: 0.48
FRACTIONAL SHORTENING: 40 (ref 28–44)
INTERVENTRICULAR SEPTUM IN DIASTOLE (PARASTERNAL SHORT AXIS VIEW): 1.4 CM
INTERVENTRICULAR SEPTUM: 1.4 CM (ref 0.6–1.1)
LAAS-AP2: 20.4 CM2
LAAS-AP4: 22.3 CM2
LEFT ATRIUM SIZE: 4.4 CM
LEFT ATRIUM VOLUME (MOD BIPLANE): 71 ML
LEFT ATRIUM VOLUME INDEX (MOD BIPLANE): 32 ML/M2
LEFT INTERNAL DIMENSION IN SYSTOLE: 2.8 CM (ref 2.1–4)
LEFT VENTRICLE DIASTOLIC VOLUME (MOD BIPLANE): 72 ML
LEFT VENTRICLE SYSTOLIC VOLUME (MOD BIPLANE): 21 ML
LEFT VENTRICULAR INTERNAL DIMENSION IN DIASTOLE: 4.7 CM (ref 3.5–6)
LEFT VENTRICULAR POSTERIOR WALL IN END DIASTOLE: 1.4 CM
LEFT VENTRICULAR STROKE VOLUME: 72 ML
LV EF: 71 %
LVSV (TEICH): 72 ML
MV E'TISSUE VEL-SEP: 4 CM/S
MV PEAK A VEL: 0.99 M/S
MV PEAK E VEL: 48 CM/S
MV STENOSIS PRESSURE HALF TIME: 61 MS
MV VALVE AREA P 1/2 METHOD: 3.61
RIGHT ATRIUM AREA SYSTOLE A4C: 17.1 CM2
RIGHT VENTRICLE ID DIMENSION: 3.4 CM
SL CV LEFT ATRIUM LENGTH A2C: 5.5 CM
SL CV LV EF: 60
SL CV PED ECHO LEFT VENTRICLE DIASTOLIC VOLUME (MOD BIPLANE) 2D: 102 ML
SL CV PED ECHO LEFT VENTRICLE SYSTOLIC VOLUME (MOD BIPLANE) 2D: 30 ML
TRICUSPID ANNULAR PLANE SYSTOLIC EXCURSION: 1.9 CM

## 2024-01-04 PROCEDURE — 93306 TTE W/DOPPLER COMPLETE: CPT

## 2024-01-04 PROCEDURE — 93306 TTE W/DOPPLER COMPLETE: CPT | Performed by: INTERNAL MEDICINE

## 2024-01-05 DIAGNOSIS — I10 PRIMARY HYPERTENSION: ICD-10-CM

## 2024-01-05 DIAGNOSIS — I35.0 MODERATE AORTIC VALVE STENOSIS: Primary | ICD-10-CM

## 2024-01-29 DIAGNOSIS — I10 BENIGN ESSENTIAL HYPERTENSION: ICD-10-CM

## 2024-02-09 DIAGNOSIS — R42 DIZZINESS: ICD-10-CM

## 2024-02-09 RX ORDER — MECLIZINE HCL 12.5 MG/1
TABLET ORAL
Qty: 90 TABLET | Refills: 3 | Status: SHIPPED | OUTPATIENT
Start: 2024-02-09

## 2024-02-21 PROBLEM — Z00.00 MEDICARE ANNUAL WELLNESS VISIT, SUBSEQUENT: Status: RESOLVED | Noted: 2018-07-17 | Resolved: 2024-02-21

## 2024-02-27 ENCOUNTER — OFFICE VISIT (OUTPATIENT)
Dept: CARDIOLOGY CLINIC | Facility: CLINIC | Age: 74
End: 2024-02-27
Payer: MEDICARE

## 2024-02-27 VITALS
DIASTOLIC BLOOD PRESSURE: 84 MMHG | WEIGHT: 250 LBS | BODY MASS INDEX: 40.35 KG/M2 | OXYGEN SATURATION: 98 % | HEART RATE: 89 BPM | SYSTOLIC BLOOD PRESSURE: 152 MMHG

## 2024-02-27 DIAGNOSIS — R06.02 EXERTIONAL SHORTNESS OF BREATH: ICD-10-CM

## 2024-02-27 DIAGNOSIS — I10 PRIMARY HYPERTENSION: Primary | ICD-10-CM

## 2024-02-27 DIAGNOSIS — I35.0 MODERATE AORTIC VALVE STENOSIS: ICD-10-CM

## 2024-02-27 DIAGNOSIS — E11.65 TYPE 2 DIABETES MELLITUS WITH HYPERGLYCEMIA, WITHOUT LONG-TERM CURRENT USE OF INSULIN (HCC): ICD-10-CM

## 2024-02-27 DIAGNOSIS — E78.2 MIXED HYPERLIPIDEMIA: ICD-10-CM

## 2024-02-27 DIAGNOSIS — R94.31 ABNORMAL EKG: ICD-10-CM

## 2024-02-27 DIAGNOSIS — E11.42 DIABETIC POLYNEUROPATHY ASSOCIATED WITH TYPE 2 DIABETES MELLITUS (HCC): ICD-10-CM

## 2024-02-27 DIAGNOSIS — N18.31 STAGE 3A CHRONIC KIDNEY DISEASE (HCC): ICD-10-CM

## 2024-02-27 DIAGNOSIS — I49.3 PVC (PREMATURE VENTRICULAR CONTRACTION): ICD-10-CM

## 2024-02-27 DIAGNOSIS — R00.0 SINUS TACHYCARDIA: ICD-10-CM

## 2024-02-27 DIAGNOSIS — I35.0 NONRHEUMATIC AORTIC (VALVE) STENOSIS: ICD-10-CM

## 2024-02-27 DIAGNOSIS — E66.01 MORBID OBESITY DUE TO EXCESS CALORIES (HCC): ICD-10-CM

## 2024-02-27 PROCEDURE — 93000 ELECTROCARDIOGRAM COMPLETE: CPT | Performed by: INTERNAL MEDICINE

## 2024-02-27 PROCEDURE — 99203 OFFICE O/P NEW LOW 30 MIN: CPT | Performed by: INTERNAL MEDICINE

## 2024-02-27 NOTE — PROGRESS NOTES
Cardiology Follow Up    Kale Clay  1950  [id number]  [facility] CARDIAC CATH LAB  [address] [facility] [address]  [phone]    1. Primary hypertension  Ambulatory Referral to Cardiology      2. PVC (premature ventricular contraction)        3. Abnormal EKG  POCT ECG      4. Exertional shortness of breath        5. Mixed hyperlipidemia        6. Sinus tachycardia        7. Nonrheumatic aortic (valve) stenosis        8. Moderate aortic valve stenosis  Ambulatory Referral to Cardiology      9. Type 2 diabetes mellitus with hyperglycemia, without long-term current use of insulin (HCC)        10. Morbid obesity due to excess calories ([facility])        11. Diabetic polyneuropathy associated with type 2 diabetes mellitus ([facility])        12. Stage 3a chronic kidney disease ([facility])            Interval History: Cardiology consultation.  Patient recently underwent an echocardiogram, personally reviewed.  Left ventricular function was normal with a stage I diastolic function, there was mild left atrial enlargement, aortic valve is sclerotic and restricted, stenosis, described as moderate, mean gradient 10 mmHg, I believe is only mild.  There was mild tricuspid insufficiency, pulmonary artery pressures were not described.  Patient was seen by myself back in 2018.  The patient is currently experiencing no significant cardiac symptoms, he does have mild to moderate chronic dyspnea, he is to be very sedentary.  No regular exercise.  Denies any chest pain.  No orthopnea no PND.  Denies any syncope or presyncope.  He tries to follow a low-sodium diet, although he is not very strict about it.  No ambulatory blood pressures.  Today's is elevated at 152/84.  States compliant with a low cholesterol diet.  Lipids last year total cholesterol 145, HDL 53 LDL 61, he is on high intense statin therapy.  He is also on chronic aspirin therapy.  Primary prevention.   He does have morbid obesity BMI of 40.  As well as liver steatosis and chronic kidney disease last creatinine 1.2 GFR in the 50s.  Diabetes mellitus type 2, last hemoglobin A1c of 7.2.    Patient Active Problem List   Diagnosis    Abnormal EKG    Primary hypertension    PVC (premature ventricular contraction)    Elevated liver enzymes    Exertional shortness of breath    Fatty liver    Glaucoma    Mixed hyperlipidemia    Morbid obesity due to excess calories (HCC)    Sinus tachycardia    Type 2 diabetes mellitus with hyperglycemia, without long-term current use of insulin (HCC)    Vitamin D deficiency    Hypoxia    Diabetic neuropathy associated with type 2 diabetes mellitus (HCC)    Dizziness/Vertigo    Low TSH level    Chronic cough    Hyperbilirubinemia    Stage 3 chronic kidney disease (HCC)    Left leg pain    Nonrheumatic aortic (valve) stenosis     Past Medical History:   Diagnosis Date    Cataract, left     last assessed [id number]    Epistaxis     last assessed 16Jan2017    Exertional shortness of breath     last assessed 16jan2018    IFG (impaired fasting glucose)     last assessed 01Apr2016     Social History     Socioeconomic History    Marital status: /Civil Union     Spouse name: Not on file    Number of children: Not on file    Years of education: Not on file    Highest education level: Not on file   Occupational History    Not on file   Tobacco Use    Smoking status: Never     Passive exposure: Never    Smokeless tobacco: Never   Vaping Use    Vaping status: Never Used   Substance and Sexual Activity    Alcohol use: No    Drug use: Never    Sexual activity: Not Currently   Other Topics Concern    Not on file   Social History Narrative    Not on file     Social Determinants of Health     Financial Resource Strain: Not on file   Food Insecurity: Not on file   Transportation Needs: Not on file   Physical Activity: Not on file   Stress: Not on file   Social Connections: Not on file   Intimate  Partner Violence: Not on file   Housing Stability: Not on file      Family History   Problem Relation Age of Onset    Stomach cancer Father     No Known Problems Mother      Past Surgical History:   Procedure Laterality Date    CATARACT EXTRACTION Left 07/01/2016    L eye cataract surgery - 7/16       Current Outpatient Medications:     aspirin 325 mg tablet, Take 1 tablet by mouth daily , Disp: , Rfl:     atorvastatin (LIPITOR) 20 mg tablet, Take 1 tablet (20 mg total) by mouth daily, Disp: 90 tablet, Rfl: 3    cholecalciferol (VITAMIN D3) 1,000 units tablet, Take 1 tablet by mouth daily, Disp: , Rfl:     dorzolamide (TRUSOPT) 2 % ophthalmic solution, INSTILL 1 DROPS INTO EACH EYE TWICE DAILY, Disp: , Rfl:     glimepiride (AMARYL) 2 mg tablet, Take 1 tab. twice daily with meals, Disp: 60 tablet, Rfl: 5    hydrochlorothiazide (HYDRODIURIL) 25 mg tablet, Take 1 tablet (25 mg total) by mouth daily, Disp: 90 tablet, Rfl: 3    latanoprost (XALATAN) 0.005 % ophthalmic solution, Apply to eye, Disp: , Rfl:     losartan (COZAAR) 100 MG tablet, Take 1/2 tab. daily, Disp: 45 tablet, Rfl: 3    meclizine (ANTIVERT) 12.5 MG tablet, TAKE 1 TABLET BY MOUTH EVERY 6 TO 8 HOURS AS NEEDED FOR DIZZINESS, Disp: 90 tablet, Rfl: 3    metFORMIN (GLUCOPHAGE) 1000 MG tablet, Take 1 tablet by mouth twice daily, Disp: 180 tablet, Rfl: 3    metoprolol tartrate (LOPRESSOR) 25 mg tablet, Take 1 tablet by mouth once daily, Disp: 90 tablet, Rfl: 0    Multiple Vitamins-Minerals (MULTIVITAL-M) TABS, Take 1 tablet by mouth daily, Disp: , Rfl:     Omega-3 Fatty Acids (FISH OIL) 1,000 mg, Take by mouth 2 (two) times a day, Disp: , Rfl:     timolol (TIMOPTIC) 0.5 % ophthalmic solution, INSTILL 1 DROP INTO EACH EYE ONCE DAILY IN THE MORNING, Disp: , Rfl:     vitamin E, tocopherol, 400 units capsule, Take by mouth daily, Disp: , Rfl:     Blood Glucose Monitoring Suppl (ONE TOUCH ULTRA MINI) w/Device KIT, Use see administration instructions Check your blood  glucose 1 time per day as directed, Disp: 1 kit, Rfl: 0    glucose blood (ONE TOUCH ULTRA TEST) test strip, Test sugar 2-3 times daily (Patient not taking: Reported on 2/27/2024), Disp: 300 each, Rfl: 3    glucose blood test strip, Use 1 each daily (Patient not taking: Reported on 2/27/2024), Disp: 100 each, Rfl: 3    LIFESCAN UNISTIK II LANCETS MISC, by Does not apply route 3 (three) times a day for 90 days, Disp: 300 each, Rfl: 3  No Known Allergies    Labs:  Hospital Outpatient Visit on 01/04/2024   Component Date Value    A4C EF 01/04/2024 73     LVOT stroke volume 01/04/2024 52.50     LVOT stroke volume index 01/04/2024 23.00     LV Diastolic Volume (BP) 01/04/2024 72     LV Systolic Volume (BP) 01/04/2024 21     EF 01/04/2024 71     LVOT Cardiac Index 01/04/2024 2.05     LVOT Cardiac Output 01/04/2024 4.54     LVIDd 01/04/2024 4.70     LVIDS 01/04/2024 2.80     IVSd 01/04/2024 1.40     LVPWd 01/04/2024 1.40     FS 01/04/2024 40     MV E' Tissue Velocity Se* 01/04/2024 4     LA Volume Index (BP) 01/04/2024 32.0     E/A ratio 01/04/2024 0.48     E wave deceleration time 01/04/2024 209     MV Peak E Joo 01/04/2024 48     MV Peak A Joo 01/04/2024 0.99     AV LVOT peak gradient 01/04/2024 3     LVOT peak VTI 01/04/2024 16.72     LVOT peak joo 01/04/2024 0.87     RVID d 01/04/2024 3.4     Tricuspid annular plane * 01/04/2024 1.90     LA size 01/04/2024 4.4     LA length (A2C) 01/04/2024 5.50     LA volume (BP) 01/04/2024 71     RAA A4C 01/04/2024 17.1     LVOT diameter 01/04/2024 2.0     Aortic valve peak veloci* 01/04/2024 1.87     Ao VTI 01/04/2024 37.2     AV mean gradient 01/04/2024 10     LVOT mn grad 01/04/2024 1.0     AV peak gradient 01/04/2024 14     AV area by cont VTI 01/04/2024 1.4     AV area peak joo 01/04/2024 1.5     MV stenosis pressure 1/2* 01/04/2024 61     MV valve area p 1/2 meth* 01/04/2024 3.61     Ao root 01/04/2024 4.00     Asc Ao 01/04/2024 3.9     Aortic valve mean veloci* 01/04/2024 15.50      Left ventricular stroke * 01/04/2024 72.00     IVS 01/04/2024 1.4     LEFT VENTRICLE SYSTOLIC * 01/04/2024 30     LV DIASTOLIC VOLUME (MOD* 01/04/2024 102     Left Atrium Area-systoli* 01/04/2024 22.3     Left Atrium Area-systoli* 01/04/2024 20.4     LVSV, 2D 01/04/2024 72     LVOT area 01/04/2024 3.14     DVI 01/04/2024 0.47     AV valve area 01/04/2024 1.41     LV EF 01/04/2024 60    Appointment on 10/06/2023   Component Date Value    Hemoglobin A1C 10/06/2023 7.2 (H)     EAG 10/06/2023 160     Sodium 10/06/2023 142     Potassium 10/06/2023 3.9     Chloride 10/06/2023 100     CO2 10/06/2023 28     ANION GAP 10/06/2023 14     BUN 10/06/2023 18     Creatinine 10/06/2023 1.27     Glucose, Fasting 10/06/2023 162 (H)     Calcium 10/06/2023 9.3     eGFR 10/06/2023 54      Imaging: No results found.    Review of Systems:  Review of Systems   Constitutional:  Negative for activity change, diaphoresis, fatigue and fever.   HENT:  Negative for hearing loss, nosebleeds and trouble swallowing.    Eyes:  Negative for visual disturbance.   Respiratory:  Positive for shortness of breath. Negative for apnea, wheezing and stridor.    Cardiovascular:  Negative for chest pain, palpitations and leg swelling.   Gastrointestinal:  Negative for abdominal pain, anal bleeding and blood in stool.   Endocrine: Negative for cold intolerance.   Genitourinary:  Negative for hematuria.   Musculoskeletal:  Negative for arthralgias and gait problem.   Skin:  Negative for pallor and rash.   Allergic/Immunologic: Negative for immunocompromised state.   Neurological:  Negative for dizziness, syncope and weakness.   Hematological:  Does not bruise/bleed easily.   Psychiatric/Behavioral:  Negative for sleep disturbance. The patient is not nervous/anxious.        Physical Exam:  Physical Exam  Vitals reviewed.   Constitutional:       General: He is not in acute distress.     Appearance: Normal appearance. He is obese. He is not ill-appearing,  toxic-appearing or diaphoretic.   HENT:      Head: Normocephalic.   Eyes:      General: No scleral icterus.  Neck:      Vascular: No carotid bruit.   Cardiovascular:      Rate and Rhythm: Normal rate and regular rhythm.      Heart sounds: Murmur (2/4 systolic ejection murmur, mid peaking, A2 is normal.  Audible throughout, loudest at the base, no diastolic murmurs) heard.      No friction rub. No gallop.   Pulmonary:      Effort: Pulmonary effort is normal. No respiratory distress.      Breath sounds: Normal breath sounds. No stridor. No wheezing, rhonchi or rales.   Abdominal:      General: Bowel sounds are normal.      Palpations: Abdomen is soft.      Tenderness: There is no abdominal tenderness.   Musculoskeletal:      Right lower leg: No edema.      Left lower leg: No edema.   Skin:     General: Skin is warm and dry.      Capillary Refill: Capillary refill takes less than 2 seconds.      Coloration: Skin is not jaundiced or pale.      Findings: No bruising or erythema.   Neurological:      Mental Status: He is alert and oriented to person, place, and time.   Psychiatric:         Mood and Affect: Mood normal.         Discussion/Summary: Valvular heart disease, mild aortic stenosis.  At the present time no interventions are needed.  This is multifactorial, likely deconditioning.  He did have a stress test, pharmacological in 2018 that suggested no ischemia ejection fraction 60%.  I did suggest decreasing his aspirin dose to  81 mg daily, possibly discontinued, he prefers to continue, no bleeding issues on that regimen.  Ambulatory blood pressures been requested as well as a urinalysis to low-sodium diet and perhaps increase in exercise Program as well as weight management.     This note was completed in part utilizing [employer] direct voice recognition software.   Grammatical errors, random word insertion, spelling mistakes, and incomplete sentences may be an occasional consequence of the system secondary  to software limitations, ambient noise and hardware issues. At the time of dictation, efforts were made to edit, clarify and /or correct errors.  Please read the chart carefully and recognize, using context, where substitutions have occurred.  If you have any questions or concerns about the context, text or information contained within the body of this dictation, please contact myself, the provider, for further clarification.

## 2024-03-04 DIAGNOSIS — E11.65 TYPE 2 DIABETES MELLITUS WITH HYPERGLYCEMIA, WITHOUT LONG-TERM CURRENT USE OF INSULIN (HCC): ICD-10-CM

## 2024-03-04 RX ORDER — GLIMEPIRIDE 2 MG/1
TABLET ORAL
Qty: 60 TABLET | Refills: 5 | Status: SHIPPED | OUTPATIENT
Start: 2024-03-04

## 2024-04-08 DIAGNOSIS — E78.2 MIXED HYPERLIPIDEMIA: ICD-10-CM

## 2024-04-08 RX ORDER — ATORVASTATIN CALCIUM 20 MG/1
20 TABLET, FILM COATED ORAL DAILY
Qty: 90 TABLET | Refills: 3 | Status: SHIPPED | OUTPATIENT
Start: 2024-04-08 | End: 2024-07-07

## 2024-04-30 ENCOUNTER — OFFICE VISIT (OUTPATIENT)
Dept: ENDOCRINOLOGY | Facility: CLINIC | Age: 74
End: 2024-04-30
Payer: MEDICARE

## 2024-04-30 VITALS
WEIGHT: 248.8 LBS | HEIGHT: 66 IN | BODY MASS INDEX: 39.98 KG/M2 | SYSTOLIC BLOOD PRESSURE: 124 MMHG | OXYGEN SATURATION: 98 % | DIASTOLIC BLOOD PRESSURE: 80 MMHG | HEART RATE: 60 BPM

## 2024-04-30 DIAGNOSIS — E11.42 DIABETIC POLYNEUROPATHY ASSOCIATED WITH TYPE 2 DIABETES MELLITUS (HCC): ICD-10-CM

## 2024-04-30 DIAGNOSIS — E11.65 TYPE 2 DIABETES MELLITUS WITH HYPERGLYCEMIA, WITHOUT LONG-TERM CURRENT USE OF INSULIN (HCC): Primary | ICD-10-CM

## 2024-04-30 DIAGNOSIS — I10 BENIGN ESSENTIAL HYPERTENSION: ICD-10-CM

## 2024-04-30 DIAGNOSIS — R26.89 BALANCE PROBLEM: ICD-10-CM

## 2024-04-30 DIAGNOSIS — I10 PRIMARY HYPERTENSION: ICD-10-CM

## 2024-04-30 DIAGNOSIS — E78.2 MIXED HYPERLIPIDEMIA: ICD-10-CM

## 2024-04-30 LAB — SL AMB POCT HEMOGLOBIN AIC: 6.4 (ref ?–6.5)

## 2024-04-30 PROCEDURE — 99214 OFFICE O/P EST MOD 30 MIN: CPT

## 2024-04-30 PROCEDURE — 83036 HEMOGLOBIN GLYCOSYLATED A1C: CPT

## 2024-04-30 RX ORDER — GLIMEPIRIDE 2 MG/1
2 TABLET ORAL
Qty: 60 TABLET | Refills: 5 | Status: SHIPPED | OUTPATIENT
Start: 2024-04-30

## 2024-04-30 NOTE — PROGRESS NOTES
"Established Patient Progress Note    CC: Follow up for type 2 diabetes mellitus    Impression & Plan:    Problem List Items Addressed This Visit       Primary hypertension     Stable in office. Continue current regimen         Mixed hyperlipidemia     Due for updated lab work which has been ordered. Encouraged patient to complete. Continue current regimen for now         Type 2 diabetes mellitus with hyperglycemia, without long-term current use of insulin (HCC) - Primary     Stable for age- concern for hypoglycemia given reports of feeling clumsy and off balance. He does not eat regular meals, will often eat sporadically or skip them but will eat breakfast regularly    - decrease glimepiride to 2 mg with breakfast  - continue metformin at current dose  - advise patient to check blood sugars more regularly especially during times he feels \"off\" balance.    Lab Results   Component Value Date    HGBA1C 6.4 04/30/2024            Relevant Medications    glimepiride (AMARYL) 2 mg tablet    Other Relevant Orders    POCT hemoglobin A1c (Completed)    Diabetic neuropathy associated with type 2 diabetes mellitus (HCC)    Relevant Medications    glimepiride (AMARYL) 2 mg tablet    Balance problem    Relevant Orders    Ambulatory Referral to Neurology       Orders Placed This Encounter   Procedures    Ambulatory Referral to Neurology     Standing Status:   Future     Standing Expiration Date:   4/30/2025     Referral Priority:   Routine     Referral Type:   Consult - AMB     Referral Reason:   Specialty Services Required     Requested Specialty:   Neurology     Number of Visits Requested:   1     Expiration Date:   4/30/2025    POCT hemoglobin A1c       History of Present Illness:  Kale Clay is a 73 y.o. male with a history of type 2 diabetes, hypertension, hyperlipidemia, CKD. Complications: neuropathy. Last A1C was 6.4. Home glucose monitoring: home glucose monitor, checks sporadically. No blood sugar log provided for " review.     Hypoglycemia: denies- does not check blood sugars. Feels clumsy and off balance. Skips meals. Does not eat regularly.   Recent hospitalizations or illnesses: no  Problems with current regimen: no      Current regimen:   Glimepiride 2 mg BID  Metformin 1,000 mg BID     Last Eye Exam: UTD, 4/20/23  Last Foot Exam: UTD, 7/17/23     ACE/ARB: losartan  Has hyperlipidemia: Taking atorvastatin             Patient Active Problem List   Diagnosis    Abnormal EKG    Primary hypertension    PVC (premature ventricular contraction)    Elevated liver enzymes    Exertional shortness of breath    Fatty liver    Glaucoma    Mixed hyperlipidemia    Morbid obesity due to excess calories (HCC)    Sinus tachycardia    Type 2 diabetes mellitus with hyperglycemia, without long-term current use of insulin (HCC)    Vitamin D deficiency    Hypoxia    Diabetic neuropathy associated with type 2 diabetes mellitus (HCC)    Dizziness/Vertigo    Low TSH level    Chronic cough    Hyperbilirubinemia    Stage 3 chronic kidney disease (HCC)    Left leg pain    Nonrheumatic aortic (valve) stenosis    Balance problem      Past Medical History:   Diagnosis Date    Cataract, left     last assessed [id number]    Epistaxis     last assessed 16Jan2017    Exertional shortness of breath     last assessed 16jan2018    IFG (impaired fasting glucose)     last assessed 01Apr2016      Past Surgical History:   Procedure Laterality Date    CATARACT EXTRACTION Left 07/01/2016    L eye cataract surgery - 7/16      Family History   Problem Relation Age of Onset    Stomach cancer Father     No Known Problems Mother      Social History     Tobacco Use    Smoking status: Never     Passive exposure: Never    Smokeless tobacco: Never   Substance Use Topics    Alcohol use: No     No Known Allergies      Current Outpatient Medications:     aspirin 325 mg tablet, Take 1 tablet by mouth daily PCP told patient to take only half of a 325 MG tablet, Disp: , Rfl:      atorvastatin (LIPITOR) 20 mg tablet, Take 1 tablet (20 mg total) by mouth daily, Disp: 90 tablet, Rfl: 3    Blood Glucose Monitoring Suppl (ONE TOUCH ULTRA MINI) w/Device KIT, Use see administration instructions Check your blood glucose 1 time per day as directed, Disp: 1 kit, Rfl: 0    cholecalciferol (VITAMIN D3) 1,000 units tablet, Take 1 tablet by mouth daily, Disp: , Rfl:     dorzolamide (TRUSOPT) 2 % ophthalmic solution, INSTILL 1 DROPS INTO EACH EYE TWICE DAILY, Disp: , Rfl:     glimepiride (AMARYL) 2 mg tablet, Take 1 tablet (2 mg total) by mouth daily with breakfast Take 1 tab. twice daily with meals, Disp: 60 tablet, Rfl: 5    glucose blood (ONE TOUCH ULTRA TEST) test strip, Test sugar 2-3 times daily, Disp: 300 each, Rfl: 3    glucose blood test strip, Use 1 each daily, Disp: 100 each, Rfl: 3    hydrochlorothiazide (HYDRODIURIL) 25 mg tablet, Take 1 tablet (25 mg total) by mouth daily, Disp: 90 tablet, Rfl: 3    latanoprost (XALATAN) 0.005 % ophthalmic solution, Apply to eye, Disp: , Rfl:     "Shahab P[employer] Tabatabai, Broker"CAN UNISTIK II LANCETS MISC, by Does not apply route 3 (three) times a day for 90 days, Disp: 300 each, Rfl: 3    losartan (COZAAR) 100 MG tablet, Take 1/2 tab. daily, Disp: 45 tablet, Rfl: 3    meclizine (ANTIVERT) 12.5 MG tablet, TAKE 1 TABLET BY MOUTH EVERY 6 TO 8 HOURS AS NEEDED FOR DIZZINESS, Disp: 90 tablet, Rfl: 3    metFORMIN (GLUCOPHAGE) 1000 MG tablet, Take 1 tablet by mouth twice daily, Disp: 180 tablet, Rfl: 3    Multiple Vitamins-Minerals (MULTIVITAL-M) TABS, Take 1 tablet by mouth daily, Disp: , Rfl:     Omega-3 Fatty Acids (FISH OIL) 1,000 mg, Take by mouth 2 (two) times a day, Disp: , Rfl:     timolol (TIMOPTIC) 0.5 % ophthalmic solution, INSTILL 1 DROP INTO EACH EYE ONCE DAILY IN THE MORNING, Disp: , Rfl:     vitamin E, tocopherol, 400 units capsule, Take by mouth daily, Disp: , Rfl:     metoprolol tartrate (LOPRESSOR) 25 mg tablet, Take 1 tablet by mouth once daily, Disp: 90 tablet, Rfl:  "1    Review of Systems   Constitutional:  Negative for chills and fever.   HENT:  Negative for ear pain and sore throat.    Eyes:  Negative for pain and visual disturbance.   Respiratory:  Positive for shortness of breath. Negative for cough.         SOB with exertion. He is resting comfortably in the office.   Cardiovascular:  Negative for chest pain and palpitations.   Gastrointestinal:  Negative for abdominal pain and vomiting.   Genitourinary:  Negative for dysuria and hematuria.   Musculoskeletal:  Negative for arthralgias and back pain.   Skin:  Negative for color change and rash.   Neurological:  Positive for dizziness. Negative for seizures and syncope.   All other systems reviewed and are negative.      Physical Exam:  Body mass index is 40.16 kg/m².  /80   Pulse 60   Ht 5' 6\" (1.676 m)   Wt 113 kg (248 lb 12.8 oz)   SpO2 98%   BMI 40.16 kg/m²    Wt Readings from Last 3 Encounters:   04/30/24 113 kg (248 lb 12.8 oz)   02/27/24 113 kg (250 lb)   01/04/24 114 kg (251 lb)       Physical Exam  Vitals reviewed.   Constitutional:       Appearance: Normal appearance.   HENT:      Head: Normocephalic and atraumatic.   Cardiovascular:      Rate and Rhythm: Normal rate.   Pulmonary:      Effort: Pulmonary effort is normal.      Breath sounds: Rales present.      Comments: Right lower base. Lung sounds are diminished throughout; prescribed HCTZ. Sees cardiology  Musculoskeletal:      Right lower leg: Edema present.      Left lower leg: Edema present.      Comments: +1 bilaterally   Neurological:      Mental Status: He is alert and oriented to person, place, and time.   Psychiatric:         Mood and Affect: Mood normal.         Behavior: Behavior normal.       Diabetic Foot Exam    Labs:   Lab Results   Component Value Date    HGBA1C 6.4 04/30/2024    HGBA1C 7.2 (H) 10/06/2023    HGBA1C 7.5 (A) 06/13/2023     Lab Results   Component Value Date    CREATININE 1.27 10/06/2023    CREATININE 1.41 (H) 07/07/2023 "    CREATININE 1.17 12/08/2022    BUN 18 10/06/2023    K 3.9 10/06/2023     10/06/2023    CO2 28 10/06/2023     eGFR   Date Value Ref Range Status   10/06/2023 54 ml/min/1.73sq m Final     Lab Results   Component Value Date    HDL 53 07/07/2023    TRIG 156 (H) 07/07/2023     Lab Results   Component Value Date    ALT 46 07/07/2023    AST 37 07/07/2023    ALKPHOS 72 07/07/2023     Lab Results   Component Value Date    BNV7GQAUMHPG 1.140 11/04/2020    TRG2NKYBJQBX 1.320 11/12/2019    [id number] 0.340 (L) 10/17/2019     Lab Results   Component Value Date    FREET4 1.03 10/17/2019         Patient Instructions   Complete labs prior to next appointment      Discussed with the patient and all questioned fully answered. He will call me if any problems arise.

## 2024-04-30 NOTE — ASSESSMENT & PLAN NOTE
Due for updated lab work which has been ordered. Encouraged patient to complete. Continue current regimen for now

## 2024-04-30 NOTE — TELEPHONE ENCOUNTER
Reason for call:   [x] Refill   [] Prior Auth  [] Other:     Office:   [x] PCP/Provider - [facility] / [facility]  [] Specialty/Provider -     Medication: metoprolol tartrate     Dose/Frequency: 25mg qd    Quantity: 90    Pharmacy: [address] [place] - [address] [facility].     Does the patient have enough for 3 days?   [] Yes   [x] No - Send as HP to POD

## 2024-04-30 NOTE — ASSESSMENT & PLAN NOTE
"Stable for age- concern for hypoglycemia given reports of feeling clumsy and off balance. He does not eat regular meals, will often eat sporadically or skip them but will eat breakfast regularly    - decrease glimepiride to 2 mg with breakfast  - continue metformin at current dose  - advise patient to check blood sugars more regularly especially during times he feels \"off\" balance.    Lab Results   Component Value Date    HGBA1C 6.4 04/30/2024     "

## 2024-05-01 ENCOUNTER — FOLLOW UP (OUTPATIENT)
Dept: URBAN - METROPOLITAN AREA CLINIC 6 | Facility: CLINIC | Age: 74
End: 2024-05-01

## 2024-05-01 DIAGNOSIS — H40.1123: ICD-10-CM

## 2024-05-01 DIAGNOSIS — H40.1111: ICD-10-CM

## 2024-05-01 PROCEDURE — 92012 INTRM OPH EXAM EST PATIENT: CPT

## 2024-05-01 PROCEDURE — 92083 EXTENDED VISUAL FIELD XM: CPT

## 2024-05-01 ASSESSMENT — VISUAL ACUITY
OD_CC: 20/50-1
OD_PH: 20/40-2
OS_CC: CF 1FT

## 2024-05-01 ASSESSMENT — TONOMETRY
OS_IOP_MMHG: 20
OD_IOP_MMHG: 23

## 2024-05-24 ENCOUNTER — RX CHECK (OUTPATIENT)
Dept: URBAN - METROPOLITAN AREA CLINIC 6 | Facility: CLINIC | Age: 74
End: 2024-05-24

## 2024-05-24 DIAGNOSIS — Z96.1: ICD-10-CM

## 2024-05-24 PROCEDURE — 92015 DETERMINE REFRACTIVE STATE: CPT

## 2024-05-24 ASSESSMENT — VISUAL ACUITY
OD_CC: 20/60+1
OS_CC: CF 1FT

## 2024-06-13 ENCOUNTER — RA CDI HCC (OUTPATIENT)
Dept: OTHER | Facility: HOSPITAL | Age: 74
End: 2024-06-13

## 2024-06-13 NOTE — PROGRESS NOTES
HCC coding opportunities          Chart Reviewed number of suggestions sent to Provider: 2     Patients Insurance   E11.36 and E11.22  Medicare Insurance: Medicare

## 2024-06-19 ENCOUNTER — OFFICE VISIT (OUTPATIENT)
Dept: FAMILY MEDICINE CLINIC | Facility: CLINIC | Age: 74
End: 2024-06-19
Payer: MEDICARE

## 2024-06-19 VITALS
WEIGHT: 248 LBS | RESPIRATION RATE: 18 BRPM | HEART RATE: 82 BPM | TEMPERATURE: 99 F | SYSTOLIC BLOOD PRESSURE: 120 MMHG | DIASTOLIC BLOOD PRESSURE: 78 MMHG | BODY MASS INDEX: 39.86 KG/M2 | OXYGEN SATURATION: 95 % | HEIGHT: 66 IN

## 2024-06-19 DIAGNOSIS — N18.31 STAGE 3A CHRONIC KIDNEY DISEASE (HCC): ICD-10-CM

## 2024-06-19 DIAGNOSIS — K76.0 FATTY LIVER: ICD-10-CM

## 2024-06-19 DIAGNOSIS — R26.81 GAIT INSTABILITY: ICD-10-CM

## 2024-06-19 DIAGNOSIS — Z00.00 MEDICARE ANNUAL WELLNESS VISIT, SUBSEQUENT: ICD-10-CM

## 2024-06-19 DIAGNOSIS — E78.2 MIXED HYPERLIPIDEMIA: ICD-10-CM

## 2024-06-19 DIAGNOSIS — I35.0 NONRHEUMATIC AORTIC (VALVE) STENOSIS: ICD-10-CM

## 2024-06-19 DIAGNOSIS — E11.42 DIABETIC POLYNEUROPATHY ASSOCIATED WITH TYPE 2 DIABETES MELLITUS (HCC): ICD-10-CM

## 2024-06-19 DIAGNOSIS — E66.01 MORBID OBESITY DUE TO EXCESS CALORIES (HCC): ICD-10-CM

## 2024-06-19 DIAGNOSIS — E11.65 TYPE 2 DIABETES MELLITUS WITH HYPERGLYCEMIA, WITHOUT LONG-TERM CURRENT USE OF INSULIN (HCC): ICD-10-CM

## 2024-06-19 DIAGNOSIS — I10 PRIMARY HYPERTENSION: Primary | ICD-10-CM

## 2024-06-19 PROCEDURE — G0439 PPPS, SUBSEQ VISIT: HCPCS | Performed by: FAMILY MEDICINE

## 2024-06-19 PROCEDURE — 99214 OFFICE O/P EST MOD 30 MIN: CPT | Performed by: FAMILY MEDICINE

## 2024-06-19 NOTE — PATIENT INSTRUCTIONS
Medicare Preventive Visit Patient Instructions  Thank you for completing your Welcome to Medicare Visit or Medicare Annual Wellness Visit today. Your next wellness visit will be due in one year (6/20/2025).  The screening/preventive services that you may require over the next 5-10 years are detailed below. Some tests may not apply to you based off risk factors and/or age. Screening tests ordered at today's visit but not completed yet may show as past due. Also, please note that scanned in results may not display below.  Preventive Screenings:  Service Recommendations Previous Testing/Comments   Colorectal Cancer Screening  Colonoscopy    Fecal Occult Blood Test (FOBT)/Fecal Immunochemical Test (FIT)  Fecal DNA/Cologuard Test  Flexible Sigmoidoscopy Age: 45-75 years old   Colonoscopy: every 10 years (May be performed more frequently if at higher risk)  OR  FOBT/FIT: every 1 year  OR  Cologuard: every 3 years  OR  Sigmoidoscopy: every 5 years  Screening may be recommended earlier than age 45 if at higher risk for colorectal cancer. Also, an individualized decision between you and your healthcare provider will decide whether screening between the ages of 76-85 would be appropriate. Colonoscopy: Not on file  FOBT/FIT: Not on file  Cologuard: Not on file  Sigmoidoscopy: Not on file          Prostate Cancer Screening Individualized decision between patient and health care provider in men between ages of 55-69   Medicare will cover every 12 months beginning on the day after your 50th birthday PSA: 0.97 ng/mL           Hepatitis C Screening Once for adults born between 1945 and 1965  More frequently in patients at high risk for Hepatitis C Hep C Antibody: 12/14/2016        Diabetes Screening 1-2 times per year if you're at risk for diabetes or have pre-diabetes Fasting glucose: 162 mg/dL (10/6/2023)  A1C: 6.4 (4/30/2024)      Cholesterol Screening Once every 5 years if you don't have a lipid disorder. May order more often  based on risk factors. Lipid panel: 07/07/2023         Other Preventive Screenings Covered by Medicare:  Abdominal Aortic Aneurysm (AAA) Screening: covered once if your at risk. You're considered to be at risk if you have a family history of AAA or a male between the age of 65-75 who smoking at least 100 cigarettes in your lifetime.  Lung Cancer Screening: covers low dose CT scan once per year if you meet all of the following conditions: (1) Age 55-77; (2) No signs or symptoms of lung cancer; (3) Current smoker or have quit smoking within the last 15 years; (4) You have a tobacco smoking history of at least 20 pack years (packs per day x number of years you smoked); (5) You get a written order from a healthcare provider.  Glaucoma Screening: covered annually if you're considered high risk: (1) You have diabetes OR (2) Family history of glaucoma OR (3)  aged 50 and older OR (4)  American aged 65 and older  Osteoporosis Screening: covered every 2 years if you meet one of the following conditions: (1) Have a vertebral abnormality; (2) On glucocorticoid therapy for more than 3 months; (3) Have primary hyperparathyroidism; (4) On osteoporosis medications and need to assess response to drug therapy.  HIV Screening: covered annually if you're between the age of 15-65. Also covered annually if you are younger than 15 and older than 65 with risk factors for HIV infection. For pregnant patients, it is covered up to 3 times per pregnancy.    Immunizations:  Immunization Recommendations   Influenza Vaccine Annual influenza vaccination during flu season is recommended for all persons aged >= 6 months who do not have contraindications   Pneumococcal Vaccine   * Pneumococcal conjugate vaccine = PCV13 (Prevnar 13), PCV15 (Vaxneuvance), PCV20 (Prevnar 20)  * Pneumococcal polysaccharide vaccine = PPSV23 (Pneumovax) Adults 19-65 yo with certain risk factors or if 65+ yo  If never received any pneumonia vaccine:  recommend Prevnar 20 (PCV20)  Give PCV20 if previously received 1 dose of PCV13 or PPSV23   Hepatitis B Vaccine 3 dose series if at intermediate or high risk (ex: diabetes, end stage renal disease, liver disease)   Respiratory syncytial virus (RSV) Vaccine - COVERED BY MEDICARE PART D  * RSVPreF3 (Arexvy) CDC recommends that adults 60 years of age and older may receive a single dose of RSV vaccine using shared clinical decision-making (SCDM)   Tetanus (Td) Vaccine - COST NOT COVERED BY MEDICARE PART B Following completion of primary series, a booster dose should be given every 10 years to maintain immunity against tetanus. Td may also be given as tetanus wound prophylaxis.   Tdap Vaccine - COST NOT COVERED BY MEDICARE PART B Recommended at least once for all adults. For pregnant patients, recommended with each pregnancy.   Shingles Vaccine (Shingrix) - COST NOT COVERED BY MEDICARE PART B  2 shot series recommended in those 19 years and older who have or will have weakened immune systems or those 50 years and older     Health Maintenance Due:      Topic Date Due   • Hepatitis C Screening  Completed     Immunizations Due:      Topic Date Due   • COVID-19 Vaccine (3 - 2023-24 season) 09/01/2023     Advance Directives   What are advance directives?  Advance directives are legal documents that state your wishes and plans for medical care. These plans are made ahead of time in case you lose your ability to make decisions for yourself. Advance directives can apply to any medical decision, such as the treatments you want, and if you want to donate organs.   What are the types of advance directives?  There are many types of advance directives, and each state has rules about how to use them. You may choose a combination of any of the following:  Living will:  This is a written record of the treatment you want. You can also choose which treatments you do not want, which to limit, and which to stop at a certain time. This  includes surgery, medicine, IV fluid, and tube feedings.   Durable power of  for healthcare (DPAHC):  This is a written record that states who you want to make healthcare choices for you when you are unable to make them for yourself. This person, called a proxy, is usually a family member or a friend. You may choose more than 1 proxy.  Do not resuscitate (DNR) order:  A DNR order is used in case your heart stops beating or you stop breathing. It is a request not to have certain forms of treatment, such as CPR. A DNR order may be included in other types of advance directives.  Medical directive:  This covers the care that you want if you are in a coma, near death, or unable to make decisions for yourself. You can list the treatments you want for each condition. Treatment may include pain medicine, surgery, blood transfusions, dialysis, IV or tube feedings, and a ventilator (breathing machine).  Values history:  This document has questions about your views, beliefs, and how you feel and think about life. This information can help others choose the care that you would choose.  Why are advance directives important?  An advance directive helps you control your care. Although spoken wishes may be used, it is better to have your wishes written down. Spoken wishes can be misunderstood, or not followed. Treatments may be given even if you do not want them. An advance directive may make it easier for your family to make difficult choices about your care.   Weight Management   Why it is important to manage your weight:  Being overweight increases your risk of health conditions such as heart disease, high blood pressure, type 2 diabetes, and certain types of cancer. It can also increase your risk for osteoarthritis, sleep apnea, and other respiratory problems. Aim for a slow, steady weight loss. Even a small amount of weight loss can lower your risk of health problems.  How to lose weight safely:  A safe and healthy way  to lose weight is to eat fewer calories and get regular exercise. You can lose up about 1 pound a week by decreasing the number of calories you eat by 500 calories each day.   Healthy meal plan for weight management:  A healthy meal plan includes a variety of foods, contains fewer calories, and helps you stay healthy. A healthy meal plan includes the following:  Eat whole-grain foods more often.  A healthy meal plan should contain fiber. Fiber is the part of grains, fruits, and vegetables that is not broken down by your body. Whole-grain foods are healthy and provide extra fiber in your diet. Some examples of whole-grain foods are whole-wheat breads and pastas, oatmeal, brown rice, and bulgur.  Eat a variety of vegetables every day.  Include dark, leafy greens such as spinach, kale, ayaz greens, and mustard greens. Eat yellow and orange vegetables such as carrots, sweet potatoes, and winter squash.   Eat a variety of fruits every day.  Choose fresh or canned fruit (canned in its own juice or light syrup) instead of juice. Fruit juice has very little or no fiber.  Eat low-fat dairy foods.  Drink fat-free (skim) milk or 1% milk. Eat fat-free yogurt and low-fat cottage cheese. Try low-fat cheeses such as mozzarella and other reduced-fat cheeses.  Choose meat and other protein foods that are low in fat.  Choose beans or other legumes such as split peas or lentils. Choose fish, skinless poultry (chicken or turkey), or lean cuts of red meat (beef or pork). Before you cook meat or poultry, cut off any visible fat.   Use less fat and oil.  Try baking foods instead of frying them. Add less fat, such as margarine, sour cream, regular salad dressing and mayonnaise to foods. Eat fewer high-fat foods. Some examples of high-fat foods include french fries, doughnuts, ice cream, and cakes.  Eat fewer sweets.  Limit foods and drinks that are high in sugar. This includes candy, cookies, regular soda, and sweetened  drinks.  Exercise:  Exercise at least 30 minutes per day on most days of the week. Some examples of exercise include walking, biking, dancing, and swimming. You can also fit in more physical activity by taking the stairs instead of the elevator or parking farther away from stores. Ask your healthcare provider about the best exercise plan for you.      © Copyright [employer] 2018 Information is for End User's use only and may not be sold, redistributed or otherwise used for commercial purposes. All illustrations and images included in CareNotes® are the copyrighted property of [employer] Inc[employer] or [employer]

## 2024-06-19 NOTE — PROGRESS NOTES
Ambulatory Visit  Name: Kale Clay      : 1950      MRN: [id number]  Encounter Provider: Kathy Orozco MD  Encounter Date: 2024   Encounter department: [facility]    Chief Complaint   Patient presents with    Medicare Wellness Visit     Subsequent visit      Health Maintenance   Topic Date Due    Zoster Vaccine (1 of 2) Never done    RSV Vaccine Age 60+ Years (1 - 1-dose 60+ series) Never done    COVID-19 Vaccine (3 - 2023- season) 2023    Medicare Annual Wellness Visit (AWV)  2024    Kidney Health Evaluation: Albumin/Creatinine Ratio  2024    Kidney Health Evaluation: GFR  10/06/2024    HEMOGLOBIN A1C  10/30/2024    DM Eye Exam  2025    Fall Risk  2025    Depression Screening  2025    Diabetic Foot Exam  2025    Hepatitis C Screening  Completed    Pneumococcal Vaccine: 65+ Years  Completed    Influenza Vaccine  Completed    RSV Vaccine age 0-20 Months  Aged Out    HIB Vaccine  Aged Out    IPV Vaccine  Aged Out    Hepatitis A Vaccine  Aged Out    Meningococcal ACWY Vaccine  Aged Out    HPV Vaccine  Aged Out       Assessment & Plan   1. Primary hypertension  Assessment & Plan:  Blood pressure 120/78- at goal.  Continue Losartan 100 mg -1/2 tablet daily, HCTZ 25 mg daily,Metoprolol tartrate 25 mg daily.    2. Nonrheumatic aortic (valve) stenosis  Assessment & Plan:  Echocardiogram done in 2024 showed EF 60%, grade 1 diastolic dysfunction, moderate AS.    Followed by cardiology Dr. Castillo annually.  3. Type 2 diabetes mellitus with hyperglycemia, without long-term current use of insulin ([facility])  Assessment & Plan:    Lab Results   Component Value Date    HGBA1C 6.4 2024     Blood sugar control improved.  Continue current medical regimen including Metformin 1000 mg twice daily, Glimepiride 2 mg with breakfast as per endocrinology.     Follow-up with ophthalmology Dr. Jamil.      4. Diabetic polyneuropathy associated  with type 2 diabetes mellitus (HCC)  Assessment & Plan:    Lab Results   Component Value Date    HGBA1C 6.4 04/30/2024     Stressed the importance of keeping blood sugar under control to prevent worsening neurological complications.  5. Stage 3a chronic kidney disease (HCC)  Assessment & Plan:  Lab Results   Component Value Date    EGFR 54 10/06/2023    EGFR 48 07/07/2023    EGFR 61 12/08/2022    CREATININE 1.27 10/06/2023    CREATININE 1.41 (H) 07/07/2023    CREATININE 1.17 12/08/2022     Recommended to stay well-hydrated.  Avoid NSAIDs, nephrotoxins.  Continue to monitor labs.  6. Morbid obesity due to excess calories (HCC)  Assessment & Plan:  Commended to work on weight reduction.  7. Mixed hyperlipidemia  Assessment & Plan:  Continue atorvastatin 20 mg daily.  Check CMP, lipid panel.  8. Fatty liver  Assessment & Plan:  Continue to monitor LFTs.    Recommended to follow a low-fat diet, lose weight.    Continue Vit E 400 IU daily.  9. Medicare annual wellness visit, subsequent  10. Gait instability  Assessment & Plan:  Reviewed fall precautions.  Referred to physical therapy.  Orders:  -     Ambulatory Referral to Physical Therapy; Future      Depression Screening and Follow-up Plan: Patient was screened for depression during today's encounter. They screened negative with a PHQ-2 score of 0.      Preventive health issues were discussed with patient, and age appropriate screening tests were ordered as noted in patient's After Visit Summary. Personalized health advice and appropriate referrals for health education or preventive services given if needed, as noted in patient's After Visit Summary.    Check labs as ordered in 12/2023.  Reprinted script for blood work for patient.  Schedule follow-up office visit in 6 months.          History of Present Illness     HPI     Patient presents for 6 month follow-up visit, Medicare AWV.      PMHx: HTN, AS, Type 2 DM with diabetic neuropathy, Hyperlipidemia, Morbid obesity,  Fatty liver, CKD stage 3.      Reviewed current medications.    HTN - blood pressure remains stable.    Patient continues with stable exertional shortness of breath.    Denies chest pain.    Echocardiogram done in January 2024 showed EF 60%, grade 1 diastolic dysfunction, moderate AS.    Patient followed by cardiology Dr. Castillo, was seen in February 2024.    C/o gait instability, feeling off balance, dizzy at times.    Type 2 DM - followed by [facility] endocrinology, was seen by endocrinology [facility] in April 2024.    Hb A1C 6.4.  Dose of Glimepiride was decreased to 2 mg daily with breakfast service.  Continues on Metformin 1000 mg twice daily.    C/o blurry vision in left eye.  Eye exam done by ophthalmology Dr. Jamil 1 month ago.    Refusing colonoscopy, Cologuard testing.  Denies family history of colon cancer.    Patient Care Team:  Kathy Orozco MD as PCP - General  Zeenat Montalvo MD as PCP - Endocrinology (Endocrinology)  CLAYTON Hester as Nurse Practitioner (Endocrinology)    Review of Systems   Constitutional:  Positive for fatigue. Negative for activity change, appetite change, chills and fever.   HENT:  Negative for congestion, ear pain, sore throat and trouble swallowing.    Eyes:  Positive for visual disturbance (blurry vision in left eye). Negative for pain, discharge, redness and itching.   Respiratory:  Positive for shortness of breath. Negative for cough (stable exertional SOB).    Cardiovascular:  Negative for chest pain, palpitations and leg swelling.   Gastrointestinal:  Negative for abdominal pain, blood in stool, constipation, diarrhea, nausea and vomiting.   Genitourinary:  Negative for difficulty urinating, dysuria and hematuria.   Musculoskeletal:  Positive for arthralgias and gait problem. Negative for back pain (balance problem) and joint swelling.   Skin:  Negative for rash.   Neurological:  Positive for dizziness and numbness. Negative for syncope and headaches.  Light-headedness: in feet.  Hematological: Negative.    Psychiatric/Behavioral:  Negative for dysphoric mood and sleep disturbance. The patient is not nervous/anxious.      Medical History Reviewed by provider this encounter:  Tobacco  Allergies  Meds  Problems  Med Hx  Surg Hx  Fam Hx       Annual Wellness Visit Questionnaire   Kale is here for his Subsequent Wellness visit.     Health Risk Assessment:   Patient rates overall health as good. Patient feels that their physical health rating is same. Patient is satisfied with their life. Eyesight was rated as slightly worse. Hearing was rated as same. Patient feels that their emotional and mental health rating is same. Patients states they are never, rarely angry. Patient states they are often unusually tired/fatigued. Pain experienced in the last 7 days has been some. Patient's pain rating has been 4/10. Patient states that he has experienced no weight loss or gain in last 6 months.     Depression Screening:   PHQ-2 Score: 0      Fall Risk Screening:   In the past year, patient has experienced: no history of falling in past year      Home Safety:  Patient does not have trouble with stairs inside or outside of their home. Patient has working smoke alarms and has no working carbon monoxide detector. Home safety hazards include: none.     Nutrition:   Current diet is Regular.     Medications:   Patient is currently taking over-the-counter supplements. OTC medications include: see medication list. Patient is able to manage medications.     Activities of Daily Living (ADLs)/Instrumental Activities of Daily Living (IADLs):   Walk and transfer into and out of bed and chair?: Yes  Dress and groom yourself?: Yes    Bathe or shower yourself?: Yes    Feed yourself? Yes  Do your laundry/housekeeping?: Yes  Manage your money, pay your bills and track your expenses?: Yes  Make your own meals?: Yes    Do your own shopping?: Yes    Previous Hospitalizations:   Any  hospitalizations or ED visits within the last 12 months?: No      Advance Care Planning:   Living will: No    Durable POA for healthcare: No    Advanced directive: Yes    Advanced directive counseling given: Yes    ACP document given: Yes    End of Life Decisions reviewed with patient: Yes      Cognitive Screening:   Provider or family/friend/caregiver concerned regarding cognition?: No    PREVENTIVE SCREENINGS      Cardiovascular Screening:    General: Screening Not Indicated and History Lipid Disorder      Diabetes Screening:     General: Screening Not Indicated and History Diabetes      Colorectal Cancer Screening:     General: Risks and Benefits Discussed    Due for: Cologuard      Prostate Cancer Screening:    General: Screening Not Indicated      Osteoporosis Screening:    General: Risks and Benefits Discussed      Abdominal Aortic Aneurysm (AAA) Screening:    Risk factors include: age between 65-74 yo        General: Screening Not Indicated      Lung Cancer Screening:     General: Screening Not Indicated      Hepatitis C Screening:    General: Screening Current    Screening, Brief Intervention, and Referral to Treatment (SBIRT)    Screening  Typical number of drinks in a day: 0  Typical number of drinks in a week: 0  Interpretation: Low risk drinking behavior.    Single Item Drug Screening:  How often have you used an illegal drug (including marijuana) or a prescription medication for non-medical reasons in the past year? never    Single Item Drug Screen Score: 0  Interpretation: Negative screen for possible drug use disorder    Other Counseling Topics:   Car/seat belt/driving safety, skin self-exam and calcium and vitamin D intake and regular weightbearing exercise.     Social Determinants of Health     Food Insecurity: No Food Insecurity (6/19/2024)    Hunger Vital Sign     Worried About Running Out of Food in the Last Year: Never true     Ran Out of Food in the Last Year: Never true   Transportation Needs:  "No Transportation Needs (6/19/2024)    PRAPARE - Transportation     Lack of Transportation (Medical): No     Lack of Transportation (Non-Medical): No   Housing Stability: Unknown (6/19/2024)    Housing Stability Vital Sign     Unable to Pay for Housing in the Last Year: No   Utilities: Not At Risk (6/19/2024)    [facility] Utilities     Threatened with loss of utilities: No     No results found.    Objective     /78   Pulse 82   Temp 99 °F (37.2 °C) (Tympanic)   Resp 18   Ht 5' 6\" (1.676 m)   Wt 112 kg (248 lb)   SpO2 95%   BMI 40.03 kg/m²     Physical Exam  Vitals and nursing note reviewed.   Constitutional:       Appearance: Normal appearance.      Comments: Morbidly obese   HENT:      Head: Normocephalic and atraumatic.   Eyes:      Conjunctiva/sclera: Conjunctivae normal.      Pupils: Pupils are equal, round, and reactive to light.   Neck:      Vascular: No carotid bruit.   Cardiovascular:      Rate and Rhythm: Normal rate and regular rhythm.      Pulses: Pulses are weak.           Dorsalis pedis pulses are 1+ on the right side and 1+ on the left side.      Heart sounds: Murmur heard.   Pulmonary:      Effort: Pulmonary effort is normal.      Breath sounds: Normal breath sounds.   Abdominal:      Palpations: Abdomen is soft.      Tenderness: There is no abdominal tenderness.   Musculoskeletal:         General: No swelling.      Cervical back: Normal range of motion and neck supple.      Right lower leg: No edema.      Left lower leg: No edema.   Feet:      Right foot:      Skin integrity: No ulcer, skin breakdown, erythema, warmth, callus or dry skin.      Left foot:      Skin integrity: No ulcer, skin breakdown, erythema, warmth, callus or dry skin.   Skin:     General: Skin is warm and dry.      Findings: No rash.   Neurological:      General: No focal deficit present.      Mental Status: He is alert.      Cranial Nerves: Cranial nerve deficit: Unsteady.      Gait: Gait abnormal.   Psychiatric:         " Mood and Affect: Mood normal.       Patient's shoes and socks removed.    Right Foot/Ankle   Right Foot Inspection  Skin Exam: skin normal and skin intact. No dry skin, no warmth, no callus, no erythema, no maceration, no abnormal color, no pre-ulcer, no ulcer and no callus.     Toe Exam: No swelling, no tenderness, erythema and  no right toe deformity    Sensory   Monofilament testing: intact    Vascular  The right DP pulse is 1+.     Left Foot/Ankle  Left Foot Inspection  Skin Exam: skin normal and skin intact. No dry skin, no warmth, no erythema, no maceration, normal color, no pre-ulcer, no ulcer and no callus.     Toe Exam: No swelling, no tenderness, no erythema and no left toe deformity.     Sensory   Monofilament testing: intact    Vascular  The left DP pulse is 1+.     Assign Risk Category  No deformity present  No loss of protective sensation  Weak pulses  Risk: 0     Administrative Statements

## 2024-06-20 NOTE — ASSESSMENT & PLAN NOTE
Blood pressure 120/78- at goal.  Continue Losartan 100 mg -1/2 tablet daily, HCTZ 25 mg daily,Metoprolol tartrate 25 mg daily.

## 2024-06-20 NOTE — ASSESSMENT & PLAN NOTE
Lab Results   Component Value Date    HGBA1C 6.4 04/30/2024     Stressed the importance of keeping blood sugar under control to prevent worsening neurological complications.

## 2024-06-20 NOTE — ASSESSMENT & PLAN NOTE
Lab Results   Component Value Date    HGBA1C 6.4 04/30/2024     Blood sugar control improved.  Continue current medical regimen including Metformin 1000 mg twice daily, Glimepiride 2 mg with breakfast as per endocrinology.     Follow-up with ophthalmology Dr. Jamil.

## 2024-06-20 NOTE — ASSESSMENT & PLAN NOTE
Continue to monitor LFTs.    Recommended to follow a low-fat diet, lose weight.    Continue Vit E 400 IU daily.

## 2024-06-20 NOTE — ASSESSMENT & PLAN NOTE
Lab Results   Component Value Date    EGFR 54 10/06/2023    EGFR 48 07/07/2023    EGFR 61 12/08/2022    CREATININE 1.27 10/06/2023    CREATININE 1.41 (H) 07/07/2023    CREATININE 1.17 12/08/2022     Recommended to stay well-hydrated.  Avoid NSAIDs, nephrotoxins.  Continue to monitor labs.

## 2024-06-20 NOTE — ASSESSMENT & PLAN NOTE
Echocardiogram done in January 2024 showed EF 60%, grade 1 diastolic dysfunction, moderate AS.    Followed by cardiology Dr. Castillo annually.

## 2024-07-11 ENCOUNTER — EVALUATION (OUTPATIENT)
Dept: PHYSICAL THERAPY | Facility: REHABILITATION | Age: 74
End: 2024-07-11
Payer: MEDICARE

## 2024-07-11 DIAGNOSIS — R26.81 GAIT INSTABILITY: ICD-10-CM

## 2024-07-11 PROCEDURE — 97162 PT EVAL MOD COMPLEX 30 MIN: CPT | Performed by: PHYSICAL THERAPIST

## 2024-07-11 NOTE — PROGRESS NOTES
PT Evaluation     Today's date: 2024  Patient name: Kale Clay  : 1950  MRN: [id number]  Referring provider: Kathy Orozco MD  Dx:   Encounter Diagnosis     ICD-10-CM    1. Gait instability  R26.81 Ambulatory Referral to Physical Therapy          Start Time: 1335  Stop Time: 1415  Total time in clinic (min): 40 minutes    Assessment  Impairments: abnormal gait, abnormal muscle firing, abnormal muscle tone, abnormal or restricted ROM, abnormal movement, activity intolerance, impaired physical strength and pain with function    Assessment details: Kale Clay is a 74 y.o. male presenting to outpatient physical therapy on 24 with referral from MD for gait and balance dysfunction and c/o dizziness with walking at times, generalized fatigue. Upon evaluation, Kale demonstrates impaired endurance, LE functional strength . The listed impairments and functional limitation are effecting Kale ability to function at prior level. They can continue to benefit from physical therapy services at this times in order to address the above discussed impairments and functional limitation in order to allow for a return to premorbid status.    HEP: 2x/day walking for 2 min, 5x STS 2x    Recommend supplemental oxygen if patient continues to de SAT and become symptomatic    Understanding of Dx/Px/POC: good     Prognosis: good    Plan  Patient would benefit from: skilled PT  Planned modality interventions: thermotherapy: hydrocollator packs    Planned therapy interventions: joint mobilization, manual therapy, ADL training, balance, balance/weight bearing training, neuromuscular re-education, home exercise program, therapeutic exercise, therapeutic activities, strengthening, patient education, functional ROM exercises and gait training    Frequency: 2x week  Duration in weeks: 12  Treatment plan discussed with: patient        Subjective Evaluation    History of Present Illness  Mechanism of injury: Kale  "is a 74 y.o. male presenting to physical therapy on 07/11/24 with referral from MD and CC of dizziness, weakness in LE and balance deficits in LE. He states that over the last year he has noticed a worsening of balance.     Describes symptoms of vertigo with getting OOB and lightheadedness getting out of chair if sitting too long.                   Recurrent probem    Patient Goals  Patient goals for therapy: decreased pain    Pain  No pain reported      Diagnostic Tests  No diagnostic tests performed    Short Term Goals:   1. Patient will be Independent with hep  2. Patient will report GROC 50% or greater  3. Patient will improve 2 MWT to >300 ft with Sp02 90% or greater    Long Term Goals:   1. Patient will improve FOTO to greater then goal  2  3. Patient will continue with HEP independence to allow for decreased future reoccurrence of pain and loss in function  4. Patient will report GROC 75% or greater  5. Patient will improve 5x STS to 15\" or less  6. Patient will improve TUG to 13\" or less  7. Patient will improve 2 MWT to 350' or greater      Objective    BP: 155/90  5x STS: standard chair - 22\"  TUG: standard chair - 16\"  2 MWT: 280 ft (Sp02 82% asymptomatic)    DGI:12               Precautions: HTN, Spo2 drop, fall risk      Manuals                                                                 Neuro Re-Ed             NBOS - foam             Walking over objects             Walking with HT/HN                                                                 Ther Ex             VG             Standing HR             Standing Marches             LAQ                                                                 Ther Activity             Repeated STS 3x5            Walking for time (2+ min NV) -              Gait Training                                       Modalities                                            "

## 2024-07-16 PROBLEM — Z00.00 MEDICARE ANNUAL WELLNESS VISIT, SUBSEQUENT: Status: RESOLVED | Noted: 2018-07-17 | Resolved: 2024-07-16

## 2024-07-20 ENCOUNTER — APPOINTMENT (EMERGENCY)
Dept: RADIOLOGY | Facility: HOSPITAL | Age: 74
End: 2024-07-20
Payer: MEDICARE

## 2024-07-20 ENCOUNTER — HOSPITAL ENCOUNTER (EMERGENCY)
Facility: HOSPITAL | Age: 74
Discharge: HOME | End: 2024-07-20
Attending: EMERGENCY MEDICINE
Payer: MEDICARE

## 2024-07-20 VITALS
RESPIRATION RATE: 22 BRPM | TEMPERATURE: 98.9 F | HEART RATE: 87 BPM | DIASTOLIC BLOOD PRESSURE: 86 MMHG | OXYGEN SATURATION: 92 % | SYSTOLIC BLOOD PRESSURE: 171 MMHG

## 2024-07-20 DIAGNOSIS — R05.9 COUGH: Primary | ICD-10-CM

## 2024-07-20 LAB
ALBUMIN SERPL BCG-MCNC: 3.9 G/DL (ref 3.5–5)
ALP SERPL-CCNC: 71 U/L (ref 34–104)
ALT SERPL W P-5'-P-CCNC: 25 U/L (ref 7–52)
ANION GAP SERPL CALCULATED.3IONS-SCNC: 9 MMOL/L (ref 4–13)
AST SERPL W P-5'-P-CCNC: 25 U/L (ref 13–39)
ATRIAL RATE: 85 BPM
BASOPHILS # BLD AUTO: 0.05 THOUSANDS/ÂΜL (ref 0–0.1)
BASOPHILS NFR BLD AUTO: 0 % (ref 0–1)
BILIRUB SERPL-MCNC: 1.25 MG/DL (ref 0.2–1)
BNP SERPL-MCNC: 59 PG/ML (ref 0–100)
BUN SERPL-MCNC: 16 MG/DL (ref 5–25)
CALCIUM SERPL-MCNC: 9 MG/DL (ref 8.4–10.2)
CARDIAC TROPONIN I PNL SERPL HS: 6 NG/L
CHLORIDE SERPL-SCNC: 100 MMOL/L (ref 96–108)
CO2 SERPL-SCNC: 30 MMOL/L (ref 21–32)
CREAT SERPL-MCNC: 1.37 MG/DL (ref 0.6–1.3)
EOSINOPHIL # BLD AUTO: 0.21 THOUSAND/ÂΜL (ref 0–0.61)
EOSINOPHIL NFR BLD AUTO: 2 % (ref 0–6)
ERYTHROCYTE [DISTWIDTH] IN BLOOD BY AUTOMATED COUNT: 13.9 % (ref 11.6–15.1)
FLUAV RNA RESP QL NAA+PROBE: NEGATIVE
FLUBV RNA RESP QL NAA+PROBE: NEGATIVE
GFR SERPL CREATININE-BSD FRML MDRD: 49 ML/MIN/1.73SQ M
GLUCOSE SERPL-MCNC: 76 MG/DL (ref 65–140)
HCT VFR BLD AUTO: 39.1 % (ref 36.5–49.3)
HGB BLD-MCNC: 12.7 G/DL (ref 12–17)
IMM GRANULOCYTES # BLD AUTO: 0.08 THOUSAND/UL (ref 0–0.2)
IMM GRANULOCYTES NFR BLD AUTO: 1 % (ref 0–2)
LYMPHOCYTES # BLD AUTO: 1.42 THOUSANDS/ÂΜL (ref 0.6–4.47)
LYMPHOCYTES NFR BLD AUTO: 11 % (ref 14–44)
MCH RBC QN AUTO: 29.5 PG (ref 26.8–34.3)
MCHC RBC AUTO-ENTMCNC: 32.5 G/DL (ref 31.4–37.4)
MCV RBC AUTO: 91 FL (ref 82–98)
MONOCYTES # BLD AUTO: 1.19 THOUSAND/ÂΜL (ref 0.17–1.22)
MONOCYTES NFR BLD AUTO: 9 % (ref 4–12)
NEUTROPHILS # BLD AUTO: 9.72 THOUSANDS/ÂΜL (ref 1.85–7.62)
NEUTS SEG NFR BLD AUTO: 77 % (ref 43–75)
NRBC BLD AUTO-RTO: 0 /100 WBCS
P AXIS: 20 DEGREES
PLATELET # BLD AUTO: 256 THOUSANDS/UL (ref 149–390)
PMV BLD AUTO: 9.6 FL (ref 8.9–12.7)
POTASSIUM SERPL-SCNC: 3.2 MMOL/L (ref 3.5–5.3)
PR INTERVAL: 214 MS
PROT SERPL-MCNC: 7 G/DL (ref 6.4–8.4)
QRS AXIS: -58 DEGREES
QRSD INTERVAL: 102 MS
QT INTERVAL: 404 MS
QTC INTERVAL: 480 MS
RBC # BLD AUTO: 4.3 MILLION/UL (ref 3.88–5.62)
RSV RNA RESP QL NAA+PROBE: NEGATIVE
SARS-COV-2 RNA RESP QL NAA+PROBE: NEGATIVE
SODIUM SERPL-SCNC: 139 MMOL/L (ref 135–147)
T WAVE AXIS: 17 DEGREES
VENTRICULAR RATE: 85 BPM
WBC # BLD AUTO: 12.67 THOUSAND/UL (ref 4.31–10.16)

## 2024-07-20 PROCEDURE — 71046 X-RAY EXAM CHEST 2 VIEWS: CPT

## 2024-07-20 PROCEDURE — 93005 ELECTROCARDIOGRAM TRACING: CPT

## 2024-07-20 PROCEDURE — 83880 ASSAY OF NATRIURETIC PEPTIDE: CPT

## 2024-07-20 PROCEDURE — 80053 COMPREHEN METABOLIC PANEL: CPT

## 2024-07-20 PROCEDURE — 36415 COLL VENOUS BLD VENIPUNCTURE: CPT

## 2024-07-20 PROCEDURE — 99285 EMERGENCY DEPT VISIT HI MDM: CPT | Performed by: EMERGENCY MEDICINE

## 2024-07-20 PROCEDURE — 0241U HB NFCT DS VIR RESP RNA 4 TRGT: CPT

## 2024-07-20 PROCEDURE — 84484 ASSAY OF TROPONIN QUANT: CPT

## 2024-07-20 PROCEDURE — 99283 EMERGENCY DEPT VISIT LOW MDM: CPT

## 2024-07-20 PROCEDURE — 85025 COMPLETE CBC W/AUTO DIFF WBC: CPT

## 2024-07-20 RX ORDER — ACETAMINOPHEN 325 MG/1
650 TABLET ORAL ONCE
Status: COMPLETED | OUTPATIENT
Start: 2024-07-20 | End: 2024-07-20

## 2024-07-20 RX ORDER — POTASSIUM CHLORIDE 20MEQ/15ML
40 LIQUID (ML) ORAL ONCE
Status: COMPLETED | OUTPATIENT
Start: 2024-07-20 | End: 2024-07-20

## 2024-07-20 RX ORDER — GUAIFENESIN/DEXTROMETHORPHAN 100-10MG/5
10 SYRUP ORAL ONCE
Status: COMPLETED | OUTPATIENT
Start: 2024-07-20 | End: 2024-07-20

## 2024-07-20 RX ORDER — AZITHROMYCIN 250 MG/1
TABLET, FILM COATED ORAL
Qty: 6 TABLET | Refills: 0 | Status: SHIPPED | OUTPATIENT
Start: 2024-07-20 | End: 2024-07-24

## 2024-07-20 RX ADMIN — POTASSIUM CHLORIDE 40 MEQ: 1.5 SOLUTION ORAL at 18:34

## 2024-07-20 RX ADMIN — GUAIFENESIN AND DEXTROMETHORPHAN 10 ML: 100; 10 SYRUP ORAL at 17:18

## 2024-07-20 RX ADMIN — ACETAMINOPHEN 650 MG: 325 TABLET, FILM COATED ORAL at 17:18

## 2024-07-20 NOTE — ED PROVIDER NOTES
History  Chief Complaint   Patient presents with    URI     HA and cough x1 week      Patient is a 74-year-old male, past medical history of hypertension, T2DM, presenting today with 10-day history of cough.  Patient says he feels like he has had a cold that he cannot seem to get rid of, he has had a cough, runny nose, mild headache.  He has been taking DayQuil and Tylenol which has led to some temporary relief of his symptoms.  He says he has been recently starting to have abdominal muscular pain while coughing.  Cough is worse in the morning, he says he has been having trouble sleeping and is up every couple hours because of the cough.  Decreased appetite over the past few days.    Patient does complain of shortness of breath with exertion, says this is not new.    Denies fever, nausea, vomiting.      URI      Prior to Admission Medications   Prescriptions Last Dose Informant Patient Reported? Taking?   Blood Glucose Monitoring Suppl (ONE TOUCH ULTRA MINI) w/Device KIT   No No   Sig: Use see administration instructions Check your blood glucose 1 time per day as directed   [employer] UNISTIK II LANCETS MISC  Self No No   Sig: by Does not apply route 3 (three) times a day for 90 days   Multiple Vitamins-Minerals (MULTIVITAL-M) TABS  Self Yes No   Sig: Take 1 tablet by mouth daily   Omega-3 Fatty Acids (FISH OIL) 1,000 mg  Self Yes No   Sig: Take by mouth 2 (two) times a day   aspirin 325 mg tablet  Self Yes No   Sig: Take 1 tablet by mouth daily PCP told patient to take only half of a 325 MG tablet   atorvastatin (LIPITOR) 20 mg tablet   No No   Sig: Take 1 tablet (20 mg total) by mouth daily   cholecalciferol (VITAMIN D3) 1,000 units tablet  Self Yes No   Sig: Take 1 tablet by mouth daily   dorzolamide (TRUSOPT) 2 % ophthalmic solution   Yes No   Sig: INSTILL 1 DROPS INTO EACH EYE TWICE DAILY   glimepiride (AMARYL) 2 mg tablet   No No   Sig: Take 1 tablet (2 mg total) by mouth daily with breakfast Take 1 tab. twice  daily with meals   glucose blood (ONE TOUCH ULTRA TEST) test strip  Self No No   Sig: Test sugar 2-3 times daily   glucose blood test strip  Self No No   Sig: Use 1 each daily   hydrochlorothiazide (HYDRODIURIL) 25 mg tablet   No No   Sig: Take 1 tablet (25 mg total) by mouth daily   latanoprost (XALATAN) 0.005 % ophthalmic solution  Self Yes No   Sig: Apply to eye   losartan (COZAAR) 100 MG tablet   No No   Sig: Take 1/2 tab. daily   meclizine (ANTIVERT) 12.5 MG tablet   No No   Sig: TAKE 1 TABLET BY MOUTH EVERY 6 TO 8 HOURS AS NEEDED FOR DIZZINESS   metFORMIN (GLUCOPHAGE) 1000 MG tablet   No No   Sig: Take 1 tablet by mouth twice daily   metoprolol tartrate (LOPRESSOR) 25 mg tablet   No No   Sig: Take 1 tablet by mouth once daily   timolol (TIMOPTIC) 0.5 % ophthalmic solution  Self Yes No   Sig: INSTILL 1 DROP INTO EACH EYE ONCE DAILY IN THE MORNING   vitamin E, tocopherol, 400 units capsule  Self Yes No   Sig: Take by mouth daily      Facility-Administered Medications: None       Past Medical History:   Diagnosis Date    Cataract, left     last assessed [id number]    Epistaxis     last assessed 16Jan2017    Exertional shortness of breath     last assessed 16jan2018    IFG (impaired fasting glucose)     last assessed 01Apr2016       Past Surgical History:   Procedure Laterality Date    CATARACT EXTRACTION Left 07/01/2016    L eye cataract surgery - 7/16       Family History   Problem Relation Age of Onset    Stomach cancer Father     No Known Problems Mother      I have reviewed and agree with the history as documented.    E-Cigarette/Vaping    E-Cigarette Use Never User      E-Cigarette/Vaping Substances    Nicotine No     THC No     CBD No     Flavoring No     Other No     Unknown No      Social History     Tobacco Use    Smoking status: Never     Passive exposure: Never    Smokeless tobacco: Never   Vaping Use    Vaping status: Never Used   Substance Use Topics    Alcohol use: No    Drug use: Never        Review  of Systems    Physical Exam  ED Triage Vitals   Temperature Pulse Respirations Blood Pressure SpO2   07/20/24 1545 07/20/24 1543 07/20/24 1543 07/20/24 1543 07/20/24 1543   98.9 °F (37.2 °C) 98 20 (!) 188/119 94 %      Temp Source Heart Rate Source Patient Position - Orthostatic VS BP Location FiO2 (%)   07/20/24 1545 07/20/24 1543 07/20/24 1543 07/20/24 1543 --   Temporal Monitor Sitting Left arm       Pain Score       07/20/24 1718       5             Orthostatic Vital Signs  Vitals:    07/20/24 1543 07/20/24 1749 07/20/24 1800   BP: (!) 188/119 164/81 (!) 171/86   Pulse: 98 86 87   Patient Position - Orthostatic VS: Sitting         Physical Exam  Constitutional:       General: He is not in acute distress.     Appearance: He is not toxic-appearing or diaphoretic.   HENT:      Head: Normocephalic and atraumatic.      Nose: No rhinorrhea.      Mouth/Throat:      Mouth: Mucous membranes are moist.      Pharynx: Oropharynx is clear.   Eyes:      General:         Right eye: No discharge.         Left eye: No discharge.      Conjunctiva/sclera: Conjunctivae normal.   Cardiovascular:      Rate and Rhythm: Normal rate and regular rhythm.      Heart sounds: Murmur heard.      Comments: Aortic stenosis murmur.  Pulmonary:      Effort: Pulmonary effort is normal. No respiratory distress.      Breath sounds: No wheezing.      Comments: Crackles at bilateral bases.  Frequent coughing.  Abdominal:      General: There is no distension.      Palpations: Abdomen is soft.      Tenderness: There is no abdominal tenderness.   Musculoskeletal:      Cervical back: Neck supple.   Skin:     General: Skin is warm and dry.      Coloration: Skin is not pale.      Findings: No erythema or rash.      Comments: Large amount of lesions covering back.   Neurological:      General: No focal deficit present.      Mental Status: He is alert and oriented to person, place, and time.   Psychiatric:         Mood and Affect: Mood normal.         ED  Medications  Medications   dextromethorphan-guaiFENesin (ROBITUSSIN DM) oral syrup 10 mL (10 mL Oral Given 7/20/24 1718)   acetaminophen (TYLENOL) tablet 650 mg (650 mg Oral Given 7/20/24 1718)   potassium chloride oral solution 40 mEq (40 mEq Oral Given 7/20/24 1834)       Diagnostic Studies  Results Reviewed       Procedure Component Value Units Date/Time    COVID/FLU/RSV [[id number]]  (Normal) Collected: 07/20/24 1657    Lab Status: Final result Specimen: Nares from Nose Updated: 07/20/24 1757     SARS-CoV-2 Negative     INFLUENZA A PCR Negative     INFLUENZA B PCR Negative     RSV PCR Negative    Narrative:      FOR PEDIATRIC PATIENTS - copy/paste COVID Guidelines URL to browser: [email or web address]    SARS-CoV-2 assay is a Nucleic Acid Amplification assay intended for the  qualitative detection of nucleic acid from SARS-CoV-2 in nasopharyngeal  swabs. Results are for the presumptive identification of SARS-CoV-2 RNA.    Positive results are indicative of infection with SARS-CoV-2, the virus  causing COVID-19, but do not rule out bacterial infection or co-infection  with other viruses. Laboratories within the [place] and its  territories are required to report all positive results to the appropriate  public health authorities. Negative results do not preclude SARS-CoV-2  infection and should not be used as the sole basis for treatment or other  patient management decisions. Negative results must be combined with  clinical observations, patient history, and epidemiological information.  This test has not been FDA cleared or approved.    This test has been authorized by FDA under an Emergency Use Authorization  (EUA). This test is only authorized for the duration of time the  declaration that circumstances exist justifying the authorization of the  emergency use of an in vitro diagnostic tests for detection of SARS-CoV-2  virus and/or diagnosis of COVID-19  infection under section 564(b)(1) of  the Act, 21 U.S.C. 360bbb-3(b)(1), unless the authorization is terminated  or revoked sooner. The test has been validated but independent review by FDA  and CLIA is pending.    Test performed using [email or web address]: This RT-PCR assay targets N2,  a region unique to SARS-CoV-2. A conserved region in the E-gene was chosen  for pan-Sarbecovirus detection which includes SARS-CoV-2.    According to CMS-2020-01-R, this platform meets the definition of high-throughput technology.    B-Type Natriuretic Peptide(BNP) [[id number]]  (Normal) Collected: 07/20/24 1657    Lab Status: Final result Specimen: Blood from Arm, Left Updated: 07/20/24 1757     BNP 59 pg/mL     Comprehensive metabolic panel [[id number]]  (Abnormal) Collected: 07/20/24 1657    Lab Status: Final result Specimen: Blood from Arm, Left Updated: 07/20/24 1747     Sodium 139 mmol/L      Potassium 3.2 mmol/L      Chloride 100 mmol/L      CO2 30 mmol/L      ANION GAP 9 mmol/L      BUN 16 mg/dL      Creatinine 1.37 mg/dL      Glucose 76 mg/dL      Calcium 9.0 mg/dL      AST 25 U/L      ALT 25 U/L      Alkaline Phosphatase 71 U/L      Total Protein 7.0 g/dL      Albumin 3.9 g/dL      Total Bilirubin 1.25 mg/dL      eGFR 49 ml/min/1.73sq m     Narrative:      National Kidney Disease Foundation guidelines for Chronic Kidney Disease (CKD):     Stage 1 with normal or high GFR (GFR > 90 mL/min/1.73 square meters)    Stage 2 Mild CKD (GFR = 60-89 mL/min/1.73 square meters)    Stage 3A Moderate CKD (GFR = 45-59 mL/min/1.73 square meters)    Stage 3B Moderate CKD (GFR = 30-44 mL/min/1.73 square meters)    Stage 4 Severe CKD (GFR = 15-29 mL/min/1.73 square meters)    Stage 5 End Stage CKD (GFR <15 mL/min/1.73 square meters)  Note: GFR calculation is accurate only with a steady state creatinine    HS Troponin 0hr (reflex protocol) [[id number]]  (Normal) Collected: 07/20/24 1657    Lab Status: Final result Specimen: Blood from Arm, Left  Updated: 07/20/24 1743     hs TnI 0hr 6 ng/L     CBC and differential [[id number]]  (Abnormal) Collected: 07/20/24 1657    Lab Status: Final result Specimen: Blood from Arm, Left Updated: 07/20/24 1721     WBC 12.67 Thousand/uL      RBC 4.30 Million/uL      Hemoglobin 12.7 g/dL      Hematocrit 39.1 %      MCV 91 fL      MCH 29.5 pg      MCHC 32.5 g/dL      RDW 13.9 %      MPV 9.6 fL      Platelets 256 Thousands/uL      nRBC 0 /100 WBCs      Segmented % 77 %      Immature Grans % 1 %      Lymphocytes % 11 %      Monocytes % 9 %      Eosinophils Relative 2 %      Basophils Relative 0 %      Absolute Neutrophils 9.72 Thousands/µL      Absolute Immature Grans 0.08 Thousand/uL      Absolute Lymphocytes 1.42 Thousands/µL      Absolute Monocytes 1.19 Thousand/µL      Eosinophils Absolute 0.21 Thousand/µL      Basophils Absolute 0.05 Thousands/µL                    XR chest 2 views    (Results Pending)         Procedures  ECG 12 Lead Documentation Only    Date/Time: 7/20/2024 5:20 PM    Performed by: Dena Culver DO  Authorized by: Dena Culver DO    ECG reviewed by me, the ED Provider: yes    Patient location:  ED  Previous ECG:     Previous ECG:  Compared to current    Similarity:  No change  Interpretation:     Interpretation comment:  First-degree block  Rate:     ECG rate:  85    ECG rate assessment: normal    Rhythm:     Rhythm: sinus rhythm    Ectopy:     Ectopy: none    QRS:     QRS axis:  Left  ST segments:     ST segments:  Normal  T waves:     T waves: normal          ED Course  ED Course as of 07/21/24 0233   Sat Jul 20, 2024   1728 WBC(!): 12.67   1751 Total Bilirubin(!): 1.25   1751 Creatinine(!): 1.37  baseline   1753 Potassium(!): 3.2   1800 Patient says the Robitussin and Tylenol improved his symptoms.   1810 BNP: 59   1816 Patient does not complain of chest pain, is on day 10 of symptoms, BNP WNL, cancel reflex troponins.                                       Medical Decision Making  Patient is a 74  y.o. male  who presents to the ED with 10 days of cough.    Vital signs hypertensive but stable; occasional O2 sats in low 90s. Exam as listed above    Differential diagnosis includes but is not limited to URI, bronchitis, pneumonia, CHF exacerbation, pleural effusion.    Plan   Viral panel to evaluate possible etiology of URI.  CBC to evaluate for leukocytosis and potential infectious etiology.  CMP to assess kidney function and check for electrolyte derangement.  BNP and troponin to evaluate for possible CHF.  Chest x-ray to assess for possible pneumonia, pleural effusion.  Symptomatic treatment with Robitussin and Tylenol.    View ED course above for further discussion on patient workup.     On review of previous records LV EF was 60% in January 2024.    All labs reviewed and utilized in the medical decision making process  All radiology studies independently viewed by me and interpreted by the radiologist.  I reviewed all testing with the patient.     Upon re-evaluation patient is improved, ready for discharge. Discussed z-pac treatment due to prolonged course of symptoms and low suspicion for them to be related to CHF.         Amount and/or Complexity of Data Reviewed  Labs: ordered. Decision-making details documented in ED Course.  Radiology: ordered.    Risk  OTC drugs.  Prescription drug management.          Disposition  Final diagnoses:   Cough     Time reflects when diagnosis was documented in both MDM as applicable and the Disposition within this note       Time User Action Codes Description Comment    7/20/2024  6:11 PM Dena Culver Add [R05.9] Cough           ED Disposition       ED Disposition   Discharge    Condition   Stable    Date/Time   Sat Jul 20, 2024  6:15 PM    Comment   Kale Clay discharge to home/self care.                   Follow-up Information       Follow up With Specialties Details Why Contact Info Additional Information    Kathy Orozco MD Family Medicine Call in 1 week   [address] [facility] [address]  [phone]       [facility] Emergency Department Emergency Medicine Go to  As needed, If symptoms worsen [address] [facility] [address] [phone] [facility] Emergency Department, [address], [place], [id number] [phone]            Discharge Medication List as of 7/20/2024  6:22 PM        START taking these medications    Details   azithromycin (ZITHROMAX) 250 mg tablet Take 2 tablets today then 1 tablet daily x 4 days, Normal           CONTINUE these medications which have NOT CHANGED    Details   aspirin 325 mg tablet Take 1 tablet by mouth daily PCP told patient to take only half of a 325 MG tablet, Starting Mon 7/17/2017, Historical Med      atorvastatin (LIPITOR) 20 mg tablet Take 1 tablet (20 mg total) by mouth daily, Starting Mon 4/8/2024, Until Sun 7/7/2024, Normal      Blood Glucose Monitoring Suppl (ONE TOUCH ULTRA MINI) w/Device KIT Use see administration instructions Check your blood glucose 1 time per day as directed, Starting Mon 7/17/2023, Normal      cholecalciferol (VITAMIN D3) 1,000 units tablet Take 1 tablet by mouth daily, Starting Tue 1/16/2018, Historical Med      dorzolamide (TRUSOPT) 2 % ophthalmic solution INSTILL 1 DROPS INTO EACH EYE TWICE DAILY, Historical Med      glimepiride (AMARYL) 2 mg tablet Take 1 tablet (2 mg total) by mouth daily with breakfast Take 1 tab. twice daily with meals, Starting Tue 4/30/2024, Fill Later      !! glucose blood (ONE TOUCH ULTRA TEST) test strip Test sugar 2-3 times daily, Normal      !! glucose blood test strip Use 1 each daily, Starting Fri 4/2/2021, Normal      hydrochlorothiazide (HYDRODIURIL) 25 mg tablet Take 1 tablet (25 mg total) by mouth daily, Starting Fri 9/22/2023, Until Tue 4/30/2024, Normal      latanoprost (XALATAN) 0.005 % ophthalmic solution Apply to eye, Starting Fri 6/10/2016, Historical Med      [employer] UNISTIK II  LANCETS MISC by Does not apply route 3 (three) times a day for 90 days, Starting Tue 6/12/2018, Until Tue 4/30/2024, Normal      losartan (COZAAR) 100 MG tablet Take 1/2 tab. daily, Normal      meclizine (ANTIVERT) 12.5 MG tablet TAKE 1 TABLET BY MOUTH EVERY 6 TO 8 HOURS AS NEEDED FOR DIZZINESS, Normal      metFORMIN (GLUCOPHAGE) 1000 MG tablet Take 1 tablet by mouth twice daily, Starting Mon 12/4/2023, Normal      metoprolol tartrate (LOPRESSOR) 25 mg tablet Take 1 tablet by mouth once daily, Normal      Multiple Vitamins-Minerals (MULTIVITAL-M) TABS Take 1 tablet by mouth daily, Starting Fri 4/1/2016, Historical Med      Omega-3 Fatty Acids (FISH OIL) 1,000 mg Take by mouth 2 (two) times a day, Starting Fri 4/1/2016, Historical Med      timolol (TIMOPTIC) 0.5 % ophthalmic solution INSTILL 1 DROP INTO EACH EYE ONCE DAILY IN THE MORNING, Historical Med      vitamin E, tocopherol, 400 units capsule Take by mouth daily, Starting Fri 6/10/2016, Historical Med       !! - Potential duplicate medications found. Please discuss with provider.        No discharge procedures on file.    PDMP Review       None             ED Provider  Attending physically available and evaluated Kale Clay. I managed the patient along with the ED Attending.    Electronically Signed by           Dena Culver DO  07/21/24 8484

## 2024-07-20 NOTE — DISCHARGE INSTRUCTIONS
You were seen in the emergency department for cough.    Your viral respiratory panel for COVID, flu, RSV was negative.  We will treat you with a 5-day Z-Willy for bacterial upper respiratory infection.    Please return to the emergency department if you have shortness of breath, chest pain, cough up blood, or experience worsening symptoms.

## 2024-07-20 NOTE — ED ATTENDING ATTESTATION
7/20/2024  I, Keny Nicole MD, saw and evaluated the patient. I have discussed the patient with the resident/non-physician practitioner and agree with the resident's/non-physician practitioner's findings, Plan of Care, and MDM as documented in the resident's/non-physician practitioner's note, except where noted. All available labs and Radiology studies were reviewed.  I was present for key portions of any procedure(s) performed by the resident/non-physician practitioner and I was immediately available to provide assistance.       At this point I agree with the current assessment done in the Emergency Department.  I have conducted an independent evaluation of this patient a history and physical is as follows:    74-year-old male presenting for evaluation of 11 days of cough.  Feels it is worse when laying supine sometimes.  Has longstanding history of exertional dyspnea but this is not new or worse.  No chest or back pain.  No leg pain or swelling.  No fever.  Patient additionally has some congestion and mild headache.  On exam patient awake and alert no acute distress.  Oropharynx clear.  Neck supple.  Heart regular rate and rhythm.  There is a systolic murmur which the patient states he is aware of already.  No rub or gallop.  Lungs clear to auscultation bilaterally.  Normal work of breathing.  Abdomen soft nontender, nondistended.  No extremity swelling or edema.  Skin warm and dry.  EKG, labs and chest x-ray done.  Given a week and a half of URI symptoms we will treat with azithromycin.  Instructed primary care follow-up    ED Course         Critical Care Time  Procedures

## 2024-07-22 LAB
ATRIAL RATE: 85 BPM
P AXIS: 20 DEGREES
PR INTERVAL: 214 MS
QRS AXIS: -58 DEGREES
QRSD INTERVAL: 102 MS
QT INTERVAL: 404 MS
QTC INTERVAL: 480 MS
T WAVE AXIS: 17 DEGREES
VENTRICULAR RATE: 85 BPM

## 2024-07-22 PROCEDURE — 93010 ELECTROCARDIOGRAM REPORT: CPT | Performed by: INTERNAL MEDICINE

## 2024-07-23 NOTE — TELEPHONE ENCOUNTER
----- Message from Lorraine Mora MD sent at 10/9/2023  8:08 AM EDT -----  Please call patient. Kidney function test improved since July. Sodium, potassium - within normal range. Blood sugar 162. Recommend to continue current medications for diabetes, hypertension. Follow a low carb diet. Advised to stay well-hydrated,avoid NSAIDs. [facility] [place]  Nurses Note -- 4 Eyes      7/23/2024       Skin assessed on: Q Shift      [x] No Pressure Injuries Present    []Prevention Measures Documented    [] Yes LDA  for Pressure Injury Previously documented     [] Yes New Pressure Injury Discovered   [] LDA for New Pressure Injury Added      Attending RN:  Sis Tineo RN     Second RN:  Barbara

## 2024-07-24 ENCOUNTER — APPOINTMENT (OUTPATIENT)
Dept: PHYSICAL THERAPY | Facility: REHABILITATION | Age: 74
End: 2024-07-24
Payer: MEDICARE

## 2024-07-25 ENCOUNTER — TELEPHONE (OUTPATIENT)
Age: 74
End: 2024-07-25

## 2024-07-25 NOTE — TELEPHONE ENCOUNTER
Patient had called in stating that he was told to make an appointment to be seen after being in the emergency room.     Scheduled patient for 07/26/2024

## 2024-07-26 ENCOUNTER — OFFICE VISIT (OUTPATIENT)
Dept: FAMILY MEDICINE CLINIC | Facility: CLINIC | Age: 74
End: 2024-07-26
Payer: MEDICARE

## 2024-07-26 VITALS
HEIGHT: 66 IN | OXYGEN SATURATION: 95 % | HEART RATE: 90 BPM | DIASTOLIC BLOOD PRESSURE: 78 MMHG | RESPIRATION RATE: 18 BRPM | SYSTOLIC BLOOD PRESSURE: 128 MMHG | TEMPERATURE: 98.4 F | WEIGHT: 246 LBS | BODY MASS INDEX: 39.53 KG/M2

## 2024-07-26 DIAGNOSIS — E11.65 TYPE 2 DIABETES MELLITUS WITH HYPERGLYCEMIA, WITHOUT LONG-TERM CURRENT USE OF INSULIN (HCC): Primary | ICD-10-CM

## 2024-07-26 DIAGNOSIS — E87.6 HYPOKALEMIA: ICD-10-CM

## 2024-07-26 DIAGNOSIS — I10 PRIMARY HYPERTENSION: ICD-10-CM

## 2024-07-26 PROCEDURE — G2211 COMPLEX E/M VISIT ADD ON: HCPCS | Performed by: NURSE PRACTITIONER

## 2024-07-26 PROCEDURE — 99214 OFFICE O/P EST MOD 30 MIN: CPT | Performed by: NURSE PRACTITIONER

## 2024-07-26 RX ORDER — POTASSIUM CHLORIDE 750 MG/1
10 TABLET, EXTENDED RELEASE ORAL DAILY
Qty: 90 TABLET | Refills: 0 | Status: SHIPPED | OUTPATIENT
Start: 2024-07-26

## 2024-07-26 NOTE — PROGRESS NOTES
Ambulatory Visit  Name: Kale Clay      : 1950      MRN: [id number]  Encounter Provider: CLAYTON Bishop  Encounter Date: 2024   Encounter department: [facility]    Assessment & Plan   1. Type 2 diabetes mellitus with hyperglycemia, without long-term current use of insulin ([facility])  Assessment & Plan:    Lab Results   Component Value Date    HGBA1C 6.4 2024   Patient's last hemoglobin A1c is 6.4.  He has not been checking his blood sugars daily.  He does note some occasional blurry vision.  He recently had a eye exam performed approximately 2 months ago.  I did recommend that if his vision continues to be blurry he make another appointment to be seen.  He is not checking his sugars at the time of blurred vision and I did recommend he do so.  Orders:  -     Albumin / creatinine urine ratio  2. Hypokalemia  Assessment & Plan:  Patient recently evaluated in the emergency room for an upper respiratory tract infection.  His potassium at that time was 3.2.  He is on hydrochlorothiazide.  Klor-Con 10 mg daily sent to the pharmacy.  Orders:  -     potassium chloride (Klor-Con M10) 10 mEq tablet; Take 1 tablet (10 mEq total) by mouth daily  3. Primary hypertension     History of Present Illness     Kale presents to the office today for follow-up from an emergency room visit.  He was evaluated in the emergency room on  and was provided a prescription for Zithromax for an upper respiratory tract infection.  Overall patient feels better.  She continues with over-the-counter cough syrup.  Blood work in the emergency room showed a potassium of 3.2.  He continues on hydrochlorothiazide.  Klor-Con 10 mg sent to the pharmacy for the patient to take daily.  He does have repeat blood work ordered in December prior to his visit in the office.            Review of Systems   Constitutional: Negative.  Negative for fatigue.   HENT: Negative.  Negative for congestion, postnasal drip,  rhinorrhea and trouble swallowing.    Eyes: Negative.  Negative for visual disturbance.   Respiratory: Negative.  Negative for choking and shortness of breath.    Cardiovascular: Negative.  Negative for chest pain.   Gastrointestinal: Negative.    Endocrine: Negative.    Genitourinary: Negative.    Musculoskeletal: Negative.  Negative for arthralgias, back pain, myalgias and neck pain.   Skin: Negative.    Neurological:  Negative for dizziness and headaches.   Psychiatric/Behavioral: Negative.       Current Outpatient Medications on File Prior to Visit   Medication Sig Dispense Refill   • aspirin 325 mg tablet Take 1 tablet by mouth daily PCP told patient to take only half of a 325 MG tablet     • atorvastatin (LIPITOR) 20 mg tablet Take 1 tablet (20 mg total) by mouth daily 90 tablet 3   • B Complex Vitamins (VITAMIN B COMPLEX 100 IJ) Inject 1 tablet as directed Daily at 2am     • Blood Glucose Monitoring Suppl (ONE TOUCH ULTRA MINI) w/Device KIT Use see administration instructions Check your blood glucose 1 time per day as directed 1 kit 0   • cholecalciferol (VITAMIN D3) 1,000 units tablet Take 1 tablet by mouth daily     • dorzolamide (TRUSOPT) 2 % ophthalmic solution INSTILL 1 DROPS INTO EACH EYE TWICE DAILY     • glimepiride (AMARYL) 2 mg tablet Take 1 tablet (2 mg total) by mouth daily with breakfast Take 1 tab. twice daily with meals 60 tablet 5   • glucose blood (ONE TOUCH ULTRA TEST) test strip Test sugar 2-3 times daily 300 each 3   • glucose blood test strip Use 1 each daily 100 each 3   • hydrochlorothiazide (HYDRODIURIL) 25 mg tablet Take 1 tablet (25 mg total) by mouth daily 90 tablet 3   • latanoprost (XALATAN) 0.005 % ophthalmic solution Apply to eye     • [employer] UNISTIK II LANCETS MISC by Does not apply route 3 (three) times a day for 90 days 300 each 3   • losartan (COZAAR) 100 MG tablet Take 1/2 tab. daily 45 tablet 3   • meclizine (ANTIVERT) 12.5 MG tablet TAKE 1 TABLET BY MOUTH EVERY 6 TO 8  "HOURS AS NEEDED FOR DIZZINESS 90 tablet 3   • metFORMIN (GLUCOPHAGE) 1000 MG tablet Take 1 tablet by mouth twice daily 180 tablet 3   • metoprolol tartrate (LOPRESSOR) 25 mg tablet Take 1 tablet by mouth once daily 90 tablet 1   • Multiple Vitamins-Minerals (MULTIVITAL-M) TABS Take 1 tablet by mouth daily     • Omega-3 Fatty Acids (FISH OIL) 1,000 mg Take by mouth 2 (two) times a day     • timolol (TIMOPTIC) 0.5 % ophthalmic solution INSTILL 1 DROP INTO EACH EYE ONCE DAILY IN THE MORNING     • vitamin E, tocopherol, 400 units capsule Take by mouth daily       No current facility-administered medications on file prior to visit.      Social History     Tobacco Use   • Smoking status: Never     Passive exposure: Never   • Smokeless tobacco: Never   Vaping Use   • Vaping status: Never Used   Substance and Sexual Activity   • Alcohol use: No   • Drug use: Never   • Sexual activity: Not Currently     Objective     /78 (BP Location: Left arm, Patient Position: Sitting, Cuff Size: Large)   Pulse 90   Temp 98.4 °F (36.9 °C) (Tympanic)   Resp 18   Ht 5' 6\" (1.676 m)   Wt 112 kg (246 lb)   SpO2 95%   BMI 39.71 kg/m²     Physical Exam  Vitals reviewed.   Constitutional:       Appearance: Normal appearance.   HENT:      Head: Normocephalic and atraumatic.      Nose: Nose normal.      Mouth/Throat:      Mouth: Mucous membranes are moist.   Eyes:      Extraocular Movements: Extraocular movements intact.      Pupils: Pupils are equal, round, and reactive to light.   Cardiovascular:      Rate and Rhythm: Normal rate and regular rhythm.      Pulses: Normal pulses.      Heart sounds: Normal heart sounds.   Pulmonary:      Effort: Pulmonary effort is normal.      Breath sounds: Normal breath sounds.   Musculoskeletal:         General: Normal range of motion.   Skin:     General: Skin is warm.   Neurological:      General: No focal deficit present.      Mental Status: He is alert and oriented to person, place, and time. " Mental status is at baseline.   Psychiatric:         Mood and Affect: Mood normal.         Behavior: Behavior normal.         Thought Content: Thought content normal.         Judgment: Judgment normal.       Administrative Statements

## 2024-07-26 NOTE — ASSESSMENT & PLAN NOTE
Patient recently evaluated in the emergency room for an upper respiratory tract infection.  His potassium at that time was 3.2.  He is on hydrochlorothiazide.  Klor-Con 10 mg daily sent to the pharmacy.

## 2024-07-26 NOTE — ASSESSMENT & PLAN NOTE
Lab Results   Component Value Date    HGBA1C 6.4 04/30/2024   Patient's last hemoglobin A1c is 6.4.  He has not been checking his blood sugars daily.  He does note some occasional blurry vision.  He recently had a eye exam performed approximately 2 months ago.  I did recommend that if his vision continues to be blurry he make another appointment to be seen.  He is not checking his sugars at the time of blurred vision and I did recommend he do so.

## 2024-07-30 ENCOUNTER — OFFICE VISIT (OUTPATIENT)
Dept: ENDOCRINOLOGY | Facility: CLINIC | Age: 74
End: 2024-07-30
Payer: MEDICARE

## 2024-07-30 VITALS
HEIGHT: 66 IN | SYSTOLIC BLOOD PRESSURE: 124 MMHG | OXYGEN SATURATION: 98 % | WEIGHT: 246 LBS | DIASTOLIC BLOOD PRESSURE: 80 MMHG | BODY MASS INDEX: 39.53 KG/M2 | HEART RATE: 85 BPM

## 2024-07-30 DIAGNOSIS — R42 DIZZINESS: ICD-10-CM

## 2024-07-30 DIAGNOSIS — E11.42 DIABETIC POLYNEUROPATHY ASSOCIATED WITH TYPE 2 DIABETES MELLITUS (HCC): ICD-10-CM

## 2024-07-30 DIAGNOSIS — E11.65 TYPE 2 DIABETES MELLITUS WITH HYPERGLYCEMIA, WITHOUT LONG-TERM CURRENT USE OF INSULIN (HCC): Primary | ICD-10-CM

## 2024-07-30 LAB — SL AMB POCT HEMOGLOBIN AIC: 6.6 (ref ?–6.5)

## 2024-07-30 PROCEDURE — 99214 OFFICE O/P EST MOD 30 MIN: CPT | Performed by: INTERNAL MEDICINE

## 2024-07-30 PROCEDURE — 83036 HEMOGLOBIN GLYCOSYLATED A1C: CPT | Performed by: INTERNAL MEDICINE

## 2024-07-30 NOTE — PROGRESS NOTES
Ambulatory Visit  Name: Kale Clay      : 1950      MRN: [id number]  Encounter Provider: Zeenat Montalvo MD  Encounter Date: 2024   Encounter department: [facility] [place]    Assessment & Plan     1. Type 2 diabetes mellitus with hyperglycemia, without long-term current use of insulin (HCC)  Stable for age. Concern for hypoglycemia given reports of feeling dizzy/lightheaded and off-balance. Patient does not eat regular meals. Eats breakfast, small lunch, and sometimes dinner. Has not checked blood sugars during dizzy spells.    Plan:  -     POCT hemoglobin A1c  - BMP  - Stop taking glimepiride  - Start taking Tradjenta 5 mg daily  - Discussed other options for medications. Patient is not interested in an injection as he does not like needles. He does not want to start an SGLT2i due to risk of increased urination  - Continue taking Metformin 1000 mg twice daily  2. Diabetic polyneuropathy associated with type 2 diabetes mellitus (HCC)   Loss of sensation in feet. Does not affect ability to walk. Long toenails and dry skin.     Plan:  - Referral to podiatry, Dr. Deras placed today  3. Dizziness/Vertigo   Concern for hypoglycemia given reports of feeling dizzy/lightheaded and off-balance. Patient does not eat regular meals. Eats breakfast, small lunch, and sometimes dinner. Has not checked blood sugars during dizzy spells.     Plan:  - Plan to stop glimepiride at this time due to risk of hypoglycemia  - Start Tradjenta 5 mg daily    Return to clinic in 3-4 months    History of Present Illness     Kale Clay is a 74 y.o. male with a past medical history of type 2 diabetes, hypertension, hyperlipidemia, and CKD who presents for a 3 month type 2 diabetes follow-up. He was seen in the emergency department 10 days ago for a URI and was prescribed azithromycin with improvement. Patient has neuropathy but reports it does not interfere with his ability to walk.  "Patient does not see a podiatrist. He checks his blood sugars every few days in the mornings. He reports his sugars have been around 180-200. At his last visit, glimepiride was decreased to 2 mg once daily instead of twice daily to help with dizziness. Patient reports taking glimepiride once in the mornings. He does not feel the decrease has helped with the dizziness. He continues to get dizzy/lightheaded spells after standing that resolve after about 1 minute. He also sometimes feels off balance. Patient has not checked his blood sugars at these times. He has not seen neurology. Patient reports taking metformin twice daily. Patient follows with an eye doctor for a \"bad left eye.\" He reports last seeing his eye doctor over the winter. Patient does not see a dentist as he has dentures. Patient reports eating breakfast everyday, a small lunch, and sometimes dinner. He denies headaches, nausea, vomiting, diarrhea (except for some occasional loose stools), infections (except for recent URI).    Review of Systems   Constitutional:  Negative for activity change, appetite change, fatigue and unexpected weight change.   HENT:  Negative for sore throat.    Eyes:  Positive for visual disturbance. Negative for pain.        \"Bad left eye\"   Respiratory:  Positive for cough. Negative for chest tightness and shortness of breath.         Recent URI   Cardiovascular:  Positive for leg swelling. Negative for chest pain.   Gastrointestinal:  Negative for abdominal pain, constipation, diarrhea, nausea and vomiting.   Endocrine: Positive for polyuria. Negative for heat intolerance.        More frequent nightly urination since URI   Genitourinary:  Negative for difficulty urinating and flank pain.   Musculoskeletal:  Negative for neck pain.   Skin:  Negative for wound.   Neurological:  Positive for dizziness. Negative for speech difficulty.   Psychiatric/Behavioral:  The patient is not nervous/anxious.    Diabetic Foot Exam    Patient's " shoes and socks removed.    Right Foot/Ankle   Right Foot Inspection  Skin Exam: skin normal, skin intact and dry skin. No warmth, no callus, no erythema, no maceration, no abnormal color, no pre-ulcer, no ulcer and no callus.     Sensory   Monofilament testing: diminished    Left Foot/Ankle  Left Foot Inspection  Skin Exam: skin normal, skin intact and dry skin. No warmth, no erythema, no maceration, normal color, no pre-ulcer, no ulcer and no callus.     Sensory   Monofilament testing: diminished        Pertinent Medical History       Current Outpatient Medications on File Prior to Visit   Medication Sig Dispense Refill    aspirin 325 mg tablet Take 1 tablet by mouth daily PCP told patient to take only half of a 325 MG tablet      atorvastatin (LIPITOR) 20 mg tablet Take 1 tablet (20 mg total) by mouth daily 90 tablet 3    B Complex Vitamins (VITAMIN B COMPLEX 100 IJ) Inject 1 tablet as directed Daily at 2am      Blood Glucose Monitoring Suppl (ONE TOUCH ULTRA MINI) w/Device KIT Use see administration instructions Check your blood glucose 1 time per day as directed 1 kit 0    cholecalciferol (VITAMIN D3) 1,000 units tablet Take 1 tablet by mouth daily      dorzolamide (TRUSOPT) 2 % ophthalmic solution INSTILL 1 DROPS INTO EACH EYE TWICE DAILY      glucose blood (ONE TOUCH ULTRA TEST) test strip Test sugar 2-3 times daily 300 each 3    glucose blood test strip Use 1 each daily 100 each 3    hydrochlorothiazide (HYDRODIURIL) 25 mg tablet Take 1 tablet (25 mg total) by mouth daily 90 tablet 3    latanoprost (XALATAN) 0.005 % ophthalmic solution Apply to eye      [employer] UNISTIK II LANCETS MISC by Does not apply route 3 (three) times a day for 90 days 300 each 3    losartan (COZAAR) 100 MG tablet Take 1/2 tab. daily 45 tablet 3    meclizine (ANTIVERT) 12.5 MG tablet TAKE 1 TABLET BY MOUTH EVERY 6 TO 8 HOURS AS NEEDED FOR DIZZINESS 90 tablet 3    metFORMIN (GLUCOPHAGE) 1000 MG tablet Take 1 tablet by mouth twice daily  "180 tablet 3    metoprolol tartrate (LOPRESSOR) 25 mg tablet Take 1 tablet by mouth once daily 90 tablet 1    Multiple Vitamins-Minerals (MULTIVITAL-M) TABS Take 1 tablet by mouth daily      Omega-3 Fatty Acids (FISH OIL) 1,000 mg Take by mouth 2 (two) times a day      potassium chloride (Klor-Con M10) 10 mEq tablet Take 1 tablet (10 mEq total) by mouth daily 90 tablet 0    timolol (TIMOPTIC) 0.5 % ophthalmic solution INSTILL 1 DROP INTO EACH EYE ONCE DAILY IN THE MORNING      vitamin E, tocopherol, 400 units capsule Take by mouth daily      [DISCONTINUED] glimepiride (AMARYL) 2 mg tablet Take 1 tablet (2 mg total) by mouth daily with breakfast Take 1 tab. twice daily with meals 60 tablet 5     No current facility-administered medications on file prior to visit.      Social History     Tobacco Use    Smoking status: Never     Passive exposure: Never    Smokeless tobacco: Never   Vaping Use    Vaping status: Never Used   Substance and Sexual Activity    Alcohol use: No    Drug use: Never    Sexual activity: Not Currently     Objective     /80   Pulse 85   Ht 5' 6\" (1.676 m)   Wt 112 kg (246 lb)   SpO2 98%   BMI 39.71 kg/m²     Physical Exam  Constitutional:       General: He is not in acute distress.     Appearance: Normal appearance. He is obese. He is not ill-appearing.   HENT:      Head: Normocephalic and atraumatic.      Right Ear: External ear normal.      Left Ear: External ear normal.      Nose: Nose normal.      Mouth/Throat:      Mouth: Mucous membranes are moist.   Eyes:      General: No scleral icterus.     Extraocular Movements: Extraocular movements intact.      Conjunctiva/sclera: Conjunctivae normal.      Pupils: Pupils are equal, round, and reactive to light.   Cardiovascular:      Rate and Rhythm: Normal rate and regular rhythm.      Pulses: Normal pulses.      Heart sounds: Normal heart sounds.      No friction rub.   Pulmonary:      Effort: Pulmonary effort is normal. No respiratory " distress.      Breath sounds: Decreased breath sounds present. No wheezing.   Abdominal:      Palpations: Abdomen is soft.      Tenderness: There is no abdominal tenderness.   Musculoskeletal:         General: Normal range of motion.      Cervical back: Normal range of motion.      Right lower leg: Edema (2+) present.      Left lower leg: Edema (1+) present.        Feet:    Feet:      Right foot:      Skin integrity: Dry skin present. No ulcer, skin breakdown, erythema, warmth or callus.      Left foot:      Skin integrity: Dry skin present. No ulcer, skin breakdown, erythema, warmth or callus.   Skin:     General: Skin is warm.      Coloration: Skin is not jaundiced or pale.   Neurological:      General: No focal deficit present.      Mental Status: He is alert.      Sensory: Sensory deficit (neuropathy to bilateral feet) present.   Psychiatric:         Mood and Affect: Mood normal.       Administrative Statements         Brionna Amador  [facility] - [facility] 4  3:13 PM

## 2024-07-31 ENCOUNTER — APPOINTMENT (OUTPATIENT)
Dept: LAB | Facility: CLINIC | Age: 74
End: 2024-07-31
Payer: MEDICARE

## 2024-07-31 DIAGNOSIS — K76.0 FATTY LIVER: ICD-10-CM

## 2024-07-31 DIAGNOSIS — I10 PRIMARY HYPERTENSION: ICD-10-CM

## 2024-07-31 DIAGNOSIS — N18.31 STAGE 3A CHRONIC KIDNEY DISEASE (HCC): ICD-10-CM

## 2024-07-31 DIAGNOSIS — Z12.11 SCREENING FOR COLON CANCER: ICD-10-CM

## 2024-07-31 DIAGNOSIS — E11.65 TYPE 2 DIABETES MELLITUS WITH HYPERGLYCEMIA, WITHOUT LONG-TERM CURRENT USE OF INSULIN (HCC): ICD-10-CM

## 2024-07-31 DIAGNOSIS — E78.2 MIXED HYPERLIPIDEMIA: ICD-10-CM

## 2024-07-31 DIAGNOSIS — I10 PRIMARY HYPERTENSION: Primary | ICD-10-CM

## 2024-07-31 LAB
ALBUMIN SERPL BCG-MCNC: 4 G/DL (ref 3.5–5)
ALP SERPL-CCNC: 80 U/L (ref 34–104)
ALT SERPL W P-5'-P-CCNC: 37 U/L (ref 7–52)
ANION GAP SERPL CALCULATED.3IONS-SCNC: 13 MMOL/L (ref 4–13)
AST SERPL W P-5'-P-CCNC: 35 U/L (ref 13–39)
BILIRUB SERPL-MCNC: 0.83 MG/DL (ref 0.2–1)
BUN SERPL-MCNC: 15 MG/DL (ref 5–25)
CALCIUM SERPL-MCNC: 9.2 MG/DL (ref 8.4–10.2)
CHLORIDE SERPL-SCNC: 99 MMOL/L (ref 96–108)
CHOLEST SERPL-MCNC: 140 MG/DL
CO2 SERPL-SCNC: 27 MMOL/L (ref 21–32)
CREAT SERPL-MCNC: 1.15 MG/DL (ref 0.6–1.3)
CREAT UR-MCNC: 97.8 MG/DL
EST. AVERAGE GLUCOSE BLD GHB EST-MCNC: 157 MG/DL
GFR SERPL CREATININE-BSD FRML MDRD: 61 ML/MIN/1.73SQ M
GLUCOSE P FAST SERPL-MCNC: 144 MG/DL (ref 65–99)
HBA1C MFR BLD: 7.1 %
HDLC SERPL-MCNC: 50 MG/DL
LDLC SERPL CALC-MCNC: 45 MG/DL (ref 0–100)
MICROALBUMIN UR-MCNC: 18.8 MG/L
MICROALBUMIN/CREAT 24H UR: 19 MG/G CREATININE (ref 0–30)
POTASSIUM SERPL-SCNC: 4.3 MMOL/L (ref 3.5–5.3)
PROT SERPL-MCNC: 7.4 G/DL (ref 6.4–8.4)
SODIUM SERPL-SCNC: 139 MMOL/L (ref 135–147)
TRIGL SERPL-MCNC: 227 MG/DL

## 2024-07-31 PROCEDURE — 83036 HEMOGLOBIN GLYCOSYLATED A1C: CPT

## 2024-07-31 PROCEDURE — 80053 COMPREHEN METABOLIC PANEL: CPT

## 2024-07-31 PROCEDURE — 82570 ASSAY OF URINE CREATININE: CPT

## 2024-07-31 PROCEDURE — 80061 LIPID PANEL: CPT

## 2024-07-31 PROCEDURE — 36415 COLL VENOUS BLD VENIPUNCTURE: CPT

## 2024-07-31 PROCEDURE — 82043 UR ALBUMIN QUANTITATIVE: CPT

## 2024-08-01 ENCOUNTER — OFFICE VISIT (OUTPATIENT)
Dept: PHYSICAL THERAPY | Facility: REHABILITATION | Age: 74
End: 2024-08-01
Payer: MEDICARE

## 2024-08-01 ENCOUNTER — TELEPHONE (OUTPATIENT)
Age: 74
End: 2024-08-01

## 2024-08-01 DIAGNOSIS — R26.81 GAIT INSTABILITY: Primary | ICD-10-CM

## 2024-08-01 DIAGNOSIS — I10 BENIGN ESSENTIAL HYPERTENSION: ICD-10-CM

## 2024-08-01 PROCEDURE — 97110 THERAPEUTIC EXERCISES: CPT | Performed by: PHYSICAL THERAPIST

## 2024-08-01 RX ORDER — LOSARTAN POTASSIUM 100 MG/1
TABLET ORAL
Qty: 50 TABLET | Refills: 1 | Status: SHIPPED | OUTPATIENT
Start: 2024-08-01

## 2024-08-01 NOTE — TELEPHONE ENCOUNTER
Patient called to report he had a call earlier today reporting that his potassium levels were normal, however, he is now not sure if this is accurate and if so should he continue to take the potassium tablets?  If we can follow up with the patient to clarify, and notate chart as we do not see a call from today to review potassium levels.   Please call patients cell as he will be visiting his wife today

## 2024-08-01 NOTE — TELEPHONE ENCOUNTER
Reason for call:   [x] Refill   [] Prior Auth  [] Other:     Office:   [x] PCP/Provider -  Kathy Orozco MD   [] Specialty/Provider -     Medication: losartan (COZAAR) 100 MG tablet / Take 1/2 tablet daily     Pharmacy: [address] [place] - [address] [facility].      Does the patient have enough for 3 days?   [] Yes   [x] No - Send as HP to POD

## 2024-08-01 NOTE — TELEPHONE ENCOUNTER
Dr. Orozco, please review and advise.     Nay Ivy  8/1/2024  8:19 AM EDT Back to Top      Called patient left voicemail with results    Kathy Orozco MD  7/31/2024  8:13 PM EDT       Please call patient.  Potassium level, liver enzymes, total cholesterol and LDL are within normal range.  Triglycerides are elevated at 227 ( normal < 150)  Continue statin therapy, follow a low-cholesterol, low-fat diet.  Kidney function test improved.  Recommend to keep sufficient fluid intake.     Recheck labs prior to next visit in 12/2024.  Order placed in chart.

## 2024-08-01 NOTE — PROGRESS NOTES
Daily Note     Today's date: 2024  Patient name: Kale Clay  : 1950  MRN: [id number]  Referring provider: Kathy Orozco MD  Dx:   Encounter Diagnosis     ICD-10-CM    1. Gait instability  R26.81           Start Time: 1545  Stop Time: 1620  Total time in clinic (min): 35 minutes    Subjective: Patient returning to PT for first session since eval 3 weeks ago. He has had a lot going on as his wife had fallen and he was organizing things for her. He also was in the hospital last week with a respiratory infection.      Objective: See treatment diary below  2 MWT: 170 ft, Sp02 85%-88%    Assessment: Tolerated treatment well. Patient requires frequnt rest breaks but was determined with PT. SOB noted with walking and noted SP02 changes above. Encouraged to continue with HEP, walking. We also did discuss benefit of supplemental 02.       Plan: Continue per plan of care.      Precautions: HTN, Spo2 drop, fall risk      Manuals                                                                 Neuro Re-Ed             NBOS - foam EO  3x    EC  5x            Walking over objects             Walking with HT/HN                                                                 Ther Ex             VG             Standing HR             Standing Marches             LAQ             UBE RPM 30+  5'            Walking for time 2'x2            5x STS 2 rds                         Ther Activity             Repeated STS 3x5            Walking for time (2+ min NV) -              Gait Training                                       Modalities                                             9

## 2024-08-07 ENCOUNTER — OFFICE VISIT (OUTPATIENT)
Dept: PHYSICAL THERAPY | Facility: REHABILITATION | Age: 74
End: 2024-08-07
Payer: MEDICARE

## 2024-08-07 DIAGNOSIS — R26.81 GAIT INSTABILITY: Primary | ICD-10-CM

## 2024-08-07 PROCEDURE — 97110 THERAPEUTIC EXERCISES: CPT | Performed by: PHYSICAL THERAPIST

## 2024-08-07 NOTE — PROGRESS NOTES
Daily Note     Today's date: 2024  Patient name: Kale Clay  : 1950  MRN: [id number]  Referring provider: Kathy Orozco MD  Dx:   Encounter Diagnosis     ICD-10-CM    1. Gait instability  R26.81           Start Time: 1245  Stop Time: 1330  Total time in clinic (min): 45 minutes    Subjective: Patient reports feeling tired today. He has been  trying to walk more at home, in driveway.       Objective: See treatment diary below  Sp02: 84-88% with walking, 91% or > a rest   2MWT: 170 ft    Assessment: Tolerated treatment fair. Patient fatigues quickly with functional tasks of walking and STS. Sp02 86%-88% when walking, SOB. Patient again strongly encouraged to consider supplemental 02.     Frequent rest breaks required      Plan: Continue per plan of care.      Precautions: HTN, Spo2 drop, fall risk      Manuals                                                                 Neuro Re-Ed            NBOS - foam EO  3x    EC  5x     EC  5x           Walking over objects             Walking with HT/HN                                                                 Ther Ex             VG             Standing HR             Standing Marches             LAQ             UBE RPM 30+  5' RPM 40+  5'  x2           Walking for time 2'x2 2'x2           5x STS 2 rds 2 rds                        Ther Activity             Repeated STS 3x5            Walking for time (2+ min NV) -              Gait Training                                       Modalities

## 2024-08-22 ENCOUNTER — TELEPHONE (OUTPATIENT)
Age: 74
End: 2024-08-22

## 2024-08-22 NOTE — TELEPHONE ENCOUNTER
Patient called- he was recently prescribed Linagliptin and ever since has been experiencing constipation. He wants to know if this is a side effect?    He said his last BM was a few days ago now. Please advise.

## 2024-08-22 NOTE — TELEPHONE ENCOUNTER
Left message for patient to stop the tradjenta to see if the symptom improves. Any questions or concerns to call us.

## 2024-10-09 ENCOUNTER — ESTABLISHED COMPREHENSIVE EXAM (OUTPATIENT)
Dept: URBAN - METROPOLITAN AREA CLINIC 6 | Facility: CLINIC | Age: 74
End: 2024-10-09

## 2024-10-09 DIAGNOSIS — H25.811: ICD-10-CM

## 2024-10-09 DIAGNOSIS — H40.1123: ICD-10-CM

## 2024-10-09 DIAGNOSIS — E11.9: ICD-10-CM

## 2024-10-09 DIAGNOSIS — H40.1111: ICD-10-CM

## 2024-10-09 PROCEDURE — 92202 OPSCPY EXTND ON/MAC DRAW: CPT

## 2024-10-09 PROCEDURE — 92014 COMPRE OPH EXAM EST PT 1/>: CPT

## 2024-10-09 PROCEDURE — 92133 CPTRZD OPH DX IMG PST SGM ON: CPT

## 2024-10-09 PROCEDURE — 92020 GONIOSCOPY: CPT

## 2024-10-09 ASSESSMENT — TONOMETRY
OS_IOP_MMHG: 20
OD_IOP_MMHG: 23

## 2024-10-09 ASSESSMENT — VISUAL ACUITY
OD_CC: 20/40
OU_CC: J2
OS_CC: 20/400

## 2024-10-10 DIAGNOSIS — I10 BENIGN ESSENTIAL HYPERTENSION: ICD-10-CM

## 2024-10-10 RX ORDER — HYDROCHLOROTHIAZIDE 25 MG/1
25 TABLET ORAL DAILY
Qty: 90 TABLET | Refills: 1 | Status: SHIPPED | OUTPATIENT
Start: 2024-10-10 | End: 2025-01-08

## 2024-10-10 NOTE — TELEPHONE ENCOUNTER
Reason for call:   [x] Refill   [] Prior Auth  [] Other:     Office:   [x] PCP/Provider - Kathy Orozco MD   [] Specialty/Provider -     Medication: hydrochlorothiazide (HYDRODIURIL) 25 mg tablet     Dose/Frequency: Take 1 tablet (25 mg total) by mouth daily     Quantity: 90    Pharmacy: [address] [place], [place] - [address]     Does the patient have enough for 3 days?   [] Yes   [x] No - Send as HP to POD

## 2024-10-22 DIAGNOSIS — I10 BENIGN ESSENTIAL HYPERTENSION: ICD-10-CM

## 2024-10-22 RX ORDER — METOPROLOL TARTRATE 25 MG/1
TABLET, FILM COATED ORAL
Qty: 90 TABLET | Refills: 1 | Status: SHIPPED | OUTPATIENT
Start: 2024-10-22

## 2024-11-04 DIAGNOSIS — R42 DIZZINESS: ICD-10-CM

## 2024-11-04 NOTE — TELEPHONE ENCOUNTER
Reason for call:   [x] Refill   [] Prior Auth  [] Other:     Office:   [x] PCP/Provider -   [] Specialty/Provider -     Medication: Meclizine 12.5 mg, take 1 tablet by mouth every 6 to 8 hours as needed       Pharmacy: [facility] [place]     Does the patient have enough for 3 days?   [x] Yes   [] No - Send as HP to POD

## 2024-11-05 RX ORDER — MECLIZINE HCL 12.5 MG 12.5 MG/1
TABLET ORAL
Qty: 90 TABLET | Refills: 1 | Status: SHIPPED | OUTPATIENT
Start: 2024-11-05

## 2024-12-31 ENCOUNTER — TELEPHONE (OUTPATIENT)
Age: 74
End: 2024-12-31

## 2025-01-02 DIAGNOSIS — E11.65 TYPE 2 DIABETES MELLITUS WITH HYPERGLYCEMIA, WITHOUT LONG-TERM CURRENT USE OF INSULIN (HCC): ICD-10-CM

## 2025-01-03 NOTE — TELEPHONE ENCOUNTER
Patient called to request a refill for their metFORMIN (GLUCOPHAGE) 1000 MG tablet advised a refill was requested on 1/3/2025 and is pending approval. Patient verbalized understanding and is in agreement.

## 2025-01-27 ENCOUNTER — OFFICE VISIT (OUTPATIENT)
Dept: FAMILY MEDICINE CLINIC | Facility: CLINIC | Age: 75
End: 2025-01-27
Payer: MEDICARE

## 2025-01-27 VITALS
DIASTOLIC BLOOD PRESSURE: 82 MMHG | WEIGHT: 234 LBS | RESPIRATION RATE: 18 BRPM | TEMPERATURE: 97.8 F | BODY MASS INDEX: 37.61 KG/M2 | HEART RATE: 93 BPM | HEIGHT: 66 IN | OXYGEN SATURATION: 96 % | SYSTOLIC BLOOD PRESSURE: 124 MMHG

## 2025-01-27 DIAGNOSIS — R26.81 GAIT INSTABILITY: ICD-10-CM

## 2025-01-27 DIAGNOSIS — I35.0 NONRHEUMATIC AORTIC (VALVE) STENOSIS: ICD-10-CM

## 2025-01-27 DIAGNOSIS — E78.2 MIXED HYPERLIPIDEMIA: ICD-10-CM

## 2025-01-27 DIAGNOSIS — I10 PRIMARY HYPERTENSION: ICD-10-CM

## 2025-01-27 DIAGNOSIS — Z23 ENCOUNTER FOR IMMUNIZATION: ICD-10-CM

## 2025-01-27 DIAGNOSIS — N18.31 STAGE 3A CHRONIC KIDNEY DISEASE (HCC): ICD-10-CM

## 2025-01-27 DIAGNOSIS — E11.42 DIABETIC POLYNEUROPATHY ASSOCIATED WITH TYPE 2 DIABETES MELLITUS (HCC): ICD-10-CM

## 2025-01-27 DIAGNOSIS — E11.65 TYPE 2 DIABETES MELLITUS WITH HYPERGLYCEMIA, WITHOUT LONG-TERM CURRENT USE OF INSULIN (HCC): Primary | ICD-10-CM

## 2025-01-27 DIAGNOSIS — K76.0 FATTY LIVER: ICD-10-CM

## 2025-01-27 PROBLEM — M79.605 LEFT LEG PAIN: Status: RESOLVED | Noted: 2023-12-19 | Resolved: 2025-01-27

## 2025-01-27 PROBLEM — R79.89 LOW TSH LEVEL: Status: RESOLVED | Noted: 2019-10-17 | Resolved: 2025-01-27

## 2025-01-27 PROBLEM — R26.89 BALANCE PROBLEM: Status: RESOLVED | Noted: 2024-04-30 | Resolved: 2025-01-27

## 2025-01-27 LAB — SL AMB POCT HEMOGLOBIN AIC: 7.5 (ref ?–6.5)

## 2025-01-27 PROCEDURE — G2211 COMPLEX E/M VISIT ADD ON: HCPCS | Performed by: NURSE PRACTITIONER

## 2025-01-27 PROCEDURE — G0008 ADMIN INFLUENZA VIRUS VAC: HCPCS

## 2025-01-27 PROCEDURE — 99214 OFFICE O/P EST MOD 30 MIN: CPT | Performed by: NURSE PRACTITIONER

## 2025-01-27 PROCEDURE — 83036 HEMOGLOBIN GLYCOSYLATED A1C: CPT | Performed by: NURSE PRACTITIONER

## 2025-01-27 PROCEDURE — 90662 IIV NO PRSV INCREASED AG IM: CPT

## 2025-01-27 NOTE — ASSESSMENT & PLAN NOTE
Up to date with foot exam. Will refer to podiatry     Orders:    Ambulatory Referral to Podiatry; Future

## 2025-01-27 NOTE — ASSESSMENT & PLAN NOTE
Blood pressure today ia 124/82.  He continues on Losartan 100 mg and metoprolol 25 mg and hydrochlorathiazide 25 mg.  He has been prescribed potassium but is not taking this.  Will check cmp   Orders:    Comprehensive metabolic panel; Future    CBC and differential; Future

## 2025-01-27 NOTE — ASSESSMENT & PLAN NOTE
Follows with cardiology. Asymptomatic      1/4/2024 echocardiogram    Left Ventricle: Left ventricular cavity size is normal. Wall thickness is mildly increased. The left ventricular ejection fraction is 60%. Systolic function is normal. Wall motion is normal. Diastolic function is mildly abnormal, consistent with grade I (abnormal) relaxation.    Right Ventricle: Systolic function is normal.    Left Atrium: The atrium is mildly dilated.    Aortic Valve: The aortic valve is trileaflet. The leaflets are moderately thickened. The leaflets are moderately calcified. There is moderately reduced mobility. There is moderate stenosis. The aortic valve mean gradient is 10 mmHg. The dimensionless velocity index is 0.47. The aortic valve area is 1.41 cm2.    Tricuspid Valve: There is mild regurgitation.

## 2025-01-27 NOTE — ASSESSMENT & PLAN NOTE
Lab Results   Component Value Date    HGBA1C 7.5 (A) 01/27/2025     Follows with endocrinology. At his last appointment with them in July of 2024, Tradjenta was prescribed. On reviewing his medications today, he states he is not taking this.  Hemoglobin A1c is 7.5.  I will send this to his pharmacy. Low carb diet recommended.  Up to date with diabetic eye exam and diabetic foot exam.       Orders:    POCT hemoglobin A1c    Albumin / creatinine urine ratio; Future    Comprehensive metabolic panel; Future    linaGLIPtin 5 MG TABS; Take 5 mg by mouth daily    Ambulatory Referral to Podiatry; Future

## 2025-01-27 NOTE — PROGRESS NOTES
Name: Kale Clay      : 1950      MRN: [id number]  Encounter Provider: CLAYTON Bishop  Encounter Date: 2025   Encounter department: [facility]  :  Assessment & Plan  Type 2 diabetes mellitus with hyperglycemia, without long-term current use of insulin (HCC)  Lab Results   Component Value Date    HGBA1C 7.5 (A) 2025     Follows with endocrinology. At his last appointment with them in 2024, Tradjenta was prescribed. On reviewing his medications today, he states he is not taking this.  Hemoglobin A1c is 7.5.  I will send this to his pharmacy. Low carb diet recommended.  Up to date with diabetic eye exam and diabetic foot exam.       Orders:    POCT hemoglobin A1c    Albumin / creatinine urine ratio; Future    Comprehensive metabolic panel; Future    linaGLIPtin 5 MG TABS; Take 5 mg by mouth daily    Ambulatory Referral to Podiatry; Future    Diabetic polyneuropathy associated with type 2 diabetes mellitus (HCC)  Up to date with foot exam. Will refer to podiatry     Orders:    Ambulatory Referral to Podiatry; Future    Stage 3a chronic kidney disease (HCC)  Lab Results   Component Value Date    EGFR 61 2024    EGFR 49 2024    EGFR 54 10/06/2023    CREATININE 1.15 2024    CREATININE 1.37 (H) 2024    CREATININE 1.27 10/06/2023     Due for surveillance blood work. Instructed patient to have this done in the next week or two       Mixed hyperlipidemia  Due for updated lipid panel.  I will contact him with results. Continue lipitor 20 mg daily. Low fat diet recommended  Orders:    Lipid panel; Future    Comprehensive metabolic panel; Future    Primary hypertension  Blood pressure today ia 124/82.  He continues on Losartan 100 mg and metoprolol 25 mg and hydrochlorathiazide 25 mg.  He has been prescribed potassium but is not taking this.  Will check cmp   Orders:    Comprehensive metabolic panel; Future    CBC and differential;  Future    Encounter for immunization    Orders:    influenza vaccine, high-dose, PF 0.5 mL (Fluzone High Dose)    Nonrheumatic aortic (valve) stenosis  Follows with cardiology. Asymptomatic      1/4/2024 echocardiogram    Left Ventricle: Left ventricular cavity size is normal. Wall thickness is mildly increased. The left ventricular ejection fraction is 60%. Systolic function is normal. Wall motion is normal. Diastolic function is mildly abnormal, consistent with grade I (abnormal) relaxation.    Right Ventricle: Systolic function is normal.    Left Atrium: The atrium is mildly dilated.    Aortic Valve: The aortic valve is trileaflet. The leaflets are moderately thickened. The leaflets are moderately calcified. There is moderately reduced mobility. There is moderate stenosis. The aortic valve mean gradient is 10 mmHg. The dimensionless velocity index is 0.47. The aortic valve area is 1.41 cm2.    Tricuspid Valve: There is mild regurgitation.         Fatty liver  Continue to monitor liver function tests.  Recommend weight loss and low fat diet         Gait instability  Referred to physical therapy last year. No recent falls                Depression Screening and Follow-up Plan: Patient was screened for depression during today's encounter. They screened negative with a PHQ-2 score of 0.      History of Present Illness   Kale presents to the office today for follow up. He continues to follow with endocrinology and cardiology. He is overdue for blood work and this will be ordered.  Flu vaccine given today.       Review of Systems   Constitutional: Negative.  Negative for fatigue.   HENT: Negative.  Negative for congestion, postnasal drip, rhinorrhea and trouble swallowing.    Eyes: Negative.  Negative for visual disturbance.   Respiratory: Negative.  Negative for choking and shortness of breath.    Cardiovascular: Negative.  Negative for chest pain.   Gastrointestinal: Negative.    Endocrine: Negative.   "  Genitourinary: Negative.    Musculoskeletal:  Positive for gait problem. Negative for arthralgias, back pain, myalgias and neck pain.   Skin: Negative.    Neurological:  Negative for dizziness and headaches.   Psychiatric/Behavioral: Negative.         Objective   /82   Pulse 93   Temp 97.8 °F (36.6 °C) (Tympanic)   Resp 18   Ht 5' 6\" (1.676 m)   Wt 106 kg (234 lb)   SpO2 96%   BMI 37.77 kg/m²      Physical Exam  Vitals reviewed.   Constitutional:       Appearance: Normal appearance. He is obese.   HENT:      Head: Normocephalic and atraumatic.      Nose: Nose normal.      Mouth/Throat:      Mouth: Mucous membranes are moist.   Eyes:      Extraocular Movements: Extraocular movements intact.      Pupils: Pupils are equal, round, and reactive to light.   Cardiovascular:      Rate and Rhythm: Normal rate and regular rhythm.      Pulses: Normal pulses.      Heart sounds: Normal heart sounds.   Pulmonary:      Effort: Pulmonary effort is normal.      Breath sounds: Normal breath sounds.   Musculoskeletal:         General: Normal range of motion.   Skin:     General: Skin is warm.   Neurological:      General: No focal deficit present.      Mental Status: He is alert and oriented to person, place, and time. Mental status is at baseline.      Gait: Gait abnormal.   Psychiatric:         Mood and Affect: Mood normal.         Behavior: Behavior normal.         Thought Content: Thought content normal.         Judgment: Judgment normal.         "

## 2025-01-27 NOTE — ASSESSMENT & PLAN NOTE
Lab Results   Component Value Date    EGFR 61 07/31/2024    EGFR 49 07/20/2024    EGFR 54 10/06/2023    CREATININE 1.15 07/31/2024    CREATININE 1.37 (H) 07/20/2024    CREATININE 1.27 10/06/2023     Due for surveillance blood work. Instructed patient to have this done in the next week or two

## 2025-01-27 NOTE — ASSESSMENT & PLAN NOTE
Due for updated lipid panel.  I will contact him with results. Continue lipitor 20 mg daily. Low fat diet recommended  Orders:    Lipid panel; Future    Comprehensive metabolic panel; Future

## 2025-01-28 ENCOUNTER — TELEPHONE (OUTPATIENT)
Dept: ADMINISTRATIVE | Facility: OTHER | Age: 75
End: 2025-01-28

## 2025-01-28 NOTE — TELEPHONE ENCOUNTER
Upon review of the In Basket request we were able to locate, review, and update the patient chart as requested for Diabetic Eye Exam.    Any additional questions or concerns should be emailed to the Practice Liaisons via the appropriate education email address, please do not reply via In Basket.    Thank you  Lori Lizama MA   [facility]

## 2025-01-28 NOTE — TELEPHONE ENCOUNTER
----- Message from Griselda KENNEY sent at 1/27/2025  3:15 PM EST -----  Regarding: Care Gap Request  01/27/25 3:15 PM    Hello, our patient Kale Clay has had Diabetic Eye Exam completed/performed. Please assist in updating the patient chart by pulling the document from the Media Tab. The date of service is 10/10/24.     Thank you,  Griselda Vora MA  [facility]

## 2025-02-26 DIAGNOSIS — I10 BENIGN ESSENTIAL HYPERTENSION: ICD-10-CM

## 2025-02-26 NOTE — TELEPHONE ENCOUNTER
Reason for call:   [x] Refill   [] Prior Auth  [] Other:     Office:   [x] PCP/Provider - new PCP Farzana Moreno   [] Specialty/Provider -     Medication: losartan (COZAAR) 100 MG     Dose/Frequency:  Take 1/2 tab. daily     Quantity: 50    Pharmacy: [facility]    Does the patient have enough for 3 days?   [x] Yes   [] No - Send as HP to POD

## 2025-02-27 RX ORDER — LOSARTAN POTASSIUM 100 MG/1
50 TABLET ORAL DAILY
Qty: 45 TABLET | Refills: 1 | Status: SHIPPED | OUTPATIENT
Start: 2025-02-27

## 2025-04-02 ENCOUNTER — OFFICE VISIT (OUTPATIENT)
Dept: ENDOCRINOLOGY | Facility: CLINIC | Age: 75
End: 2025-04-02
Payer: MEDICARE

## 2025-04-02 VITALS
OXYGEN SATURATION: 96 % | BODY MASS INDEX: 37.61 KG/M2 | HEIGHT: 66 IN | SYSTOLIC BLOOD PRESSURE: 120 MMHG | DIASTOLIC BLOOD PRESSURE: 82 MMHG | WEIGHT: 234 LBS | HEART RATE: 75 BPM

## 2025-04-02 DIAGNOSIS — E11.65 TYPE 2 DIABETES MELLITUS WITH HYPERGLYCEMIA, WITHOUT LONG-TERM CURRENT USE OF INSULIN (HCC): Primary | ICD-10-CM

## 2025-04-02 DIAGNOSIS — E78.2 MIXED HYPERLIPIDEMIA: ICD-10-CM

## 2025-04-02 DIAGNOSIS — I10 PRIMARY HYPERTENSION: ICD-10-CM

## 2025-04-02 PROCEDURE — G2211 COMPLEX E/M VISIT ADD ON: HCPCS | Performed by: PHYSICIAN ASSISTANT

## 2025-04-02 PROCEDURE — 99214 OFFICE O/P EST MOD 30 MIN: CPT | Performed by: PHYSICIAN ASSISTANT

## 2025-04-02 NOTE — PROGRESS NOTES
Name: Kale Clay      : 1950      MRN: [id number]  Encounter Provider: Re Garibay PA-C  Encounter Date: 2025   Encounter department: [facility] [place]    Chief Complaint   Patient presents with   • Diabetes Type 2     :  Assessment & Plan  Type 2 diabetes mellitus with hyperglycemia, without long-term current use of insulin (HCC)    Lab Results   Component Value Date    HGBA1C 7.5 (A) 2025   Current HbA1c represents acceptable control given patient age. Blood sugars unavailable for review.  Continue current regimen with the following adjustments:  Advised patient to monitor sugars more regularly and submit log  Advised to adhere to diabetic diet, and recommended staying active/exercising routinely.  Discussed symptoms and treatment of hypoglycemia.    Recommended routine follow-up with Ophthalmology and foot exams.   Ordered blood work to complete prior to next visit.    Orders:  •  Hemoglobin A1C; Future  •  Comprehensive metabolic panel; Future  •  Albumin / creatinine urine ratio; Future  •  Lipid panel; Future  •  Fructosamine; Future    Primary hypertension  BP at goal.   Continue current medication regimen.        Mixed hyperlipidemia  Continue statin.   Check routine lipid panel.             History of Present Illness {?Quick Links Encounters * My Last Note * Last Note in Specialty * Snapshot * Since Last Visit * History :09499}  History of Present Illness  Kale Clay is a 75-year-old male with a past medical history of hypertension, hyperlipidemia, type 2 diabetes complicated by neuropathy and chronic kidney disease (CKD), presenting for a follow-up visit.    He reports no new concerns or queries since his last visit. His blood glucose levels have remained stable, with an average reading of approximately 180 to 200. He monitors his blood glucose levels once or twice weekly. He does not experience any symptoms associated with high blood  "glucose levels, except for some memory loss. He has not required hospitalization recently, nor has he been ill or received steroid treatment. He is currently on a regimen of metformin twice daily and linagliptin.    He continues to experience unsteadiness and stiffness, which he does not believe are related to his blood glucose levels. He also reports persistent shortness of breath, which he has not sought medical attention for. This symptom has been present for several years and is exacerbated by physical activity. He has scheduled cardiology testing this summer.    He regularly consults an ophthalmologist and has a referral to a podiatrist. He reports mild foot pain and tingling sensations.              Current regimen:   Tradjenta 5mg daily  Metformin 1,000 mg BID      Last Eye Exam: 10/09/2024  Last Foot Exam: 06/19/2024  Health Maintenance   Topic Date Due   • Diabetic Foot Exam  06/19/2025   • Diabetic Eye Exam  10/09/2026         Review of Systems as per HPI         Medical History Reviewed by provider this encounter:     .    Objective {?Quick Links Trend Vitals * Enter New Vitals * Results Review * Timeline (Adult) * Labs * Imaging * Cardiology * Procedures * Lung Cancer Screening * Surgical eConsent :32025}  /82   Pulse 75   Ht 5' 6\" (1.676 m)   Wt 106 kg (234 lb)   SpO2 96%   BMI 37.77 kg/m²      Body mass index is 37.77 kg/m².  Wt Readings from Last 3 Encounters:   04/02/25 106 kg (234 lb)   01/27/25 106 kg (234 lb)   07/30/24 112 kg (246 lb)     Physical Exam    Physical Exam  Vitals and nursing note reviewed.   Constitutional:       General: He is not in acute distress.     Appearance: He is well-developed.   HENT:      Head: Normocephalic and atraumatic.   Eyes:      General: No scleral icterus.     Conjunctiva/sclera: Conjunctivae normal.   Cardiovascular:      Heart sounds: Murmur heard.   Pulmonary:      Effort: Pulmonary effort is normal.      Breath sounds: Normal breath sounds. "   Neurological:      Mental Status: He is alert.   Psychiatric:         Attention and Perception: Attention normal.       Results    Labs:   Lab Results   Component Value Date    HGBA1C 7.5 (A) 01/27/2025    HGBA1C 7.1 (H) 07/31/2024    HGBA1C 6.6 (A) 07/30/2024     Lab Results   Component Value Date    CREATININE 1.15 07/31/2024    CREATININE 1.37 (H) 07/20/2024    CREATININE 1.27 10/06/2023    BUN 15 07/31/2024    K 4.3 07/31/2024    CL 99 07/31/2024    CO2 27 07/31/2024     eGFR   Date Value Ref Range Status   07/31/2024 61 ml/min/1.73sq m Final     Lab Results   Component Value Date    HDL 50 07/31/2024    TRIG 227 (H) 07/31/2024     Lab Results   Component Value Date    ALT 37 07/31/2024    AST 35 07/31/2024    ALKPHOS 80 07/31/2024     Lab Results   Component Value Date    MYO8VYXXAJXT 1.140 11/04/2020    SPM2BVUXMQFJ 1.320 11/12/2019    KQY2NFDBPSWZ 0.340 (L) 10/17/2019       Patient Instructions   Continue your medications   Please submit sugar log  Monitor blood sugars regularly  Contact the office with any severe hypoglycemic or hyperglycemic events  Maintain appropriate diet and physical activity  Follow up in 6 months  Call with any questions, concerns, or refill requests  Obtain labs prior to your next appointment    Discussed with the patient and all questioned fully answered. He will call me if any problems arise.

## 2025-04-02 NOTE — PATIENT INSTRUCTIONS
Continue your medications   Please submit sugar log  Monitor blood sugars regularly  Contact the office with any severe hypoglycemic or hyperglycemic events  Maintain appropriate diet and physical activity  Follow up in 6 months  Call with any questions, concerns, or refill requests  Obtain labs prior to your next appointment

## 2025-04-02 NOTE — ASSESSMENT & PLAN NOTE
Lab Results   Component Value Date    HGBA1C 7.5 (A) 01/27/2025   Current HbA1c represents acceptable control given patient age. Blood sugars unavailable for review.  Continue current regimen with the following adjustments:  Advised patient to monitor sugars more regularly and submit log  Advised to adhere to diabetic diet, and recommended staying active/exercising routinely.  Discussed symptoms and treatment of hypoglycemia.    Recommended routine follow-up with Ophthalmology and foot exams.   Ordered blood work to complete prior to next visit.    Orders:  •  Hemoglobin A1C; Future  •  Comprehensive metabolic panel; Future  •  Albumin / creatinine urine ratio; Future  •  Lipid panel; Future  •  Fructosamine; Future

## 2025-04-05 NOTE — TELEPHONE ENCOUNTER
Patient called to r/s today's appt due to car trouble.  No availability with PCP for next six months.  Please contact patient to r/s.  
Patient rescheduled.  
No

## 2025-04-18 DIAGNOSIS — I10 BENIGN ESSENTIAL HYPERTENSION: ICD-10-CM

## 2025-04-18 DIAGNOSIS — E11.65 TYPE 2 DIABETES MELLITUS WITH HYPERGLYCEMIA, WITHOUT LONG-TERM CURRENT USE OF INSULIN (HCC): ICD-10-CM

## 2025-04-18 RX ORDER — HYDROCHLOROTHIAZIDE 25 MG/1
25 TABLET ORAL DAILY
Qty: 90 TABLET | Refills: 1 | Status: SHIPPED | OUTPATIENT
Start: 2025-04-18 | End: 2025-07-17

## 2025-04-18 NOTE — TELEPHONE ENCOUNTER
Reason for call:   [x] Refill   [] Prior Auth  [] Other:     Office:   [x] PCP/Provider - [facility]  Authorized By: CLAYTON Menendez    [] Specialty/Provider -     Medication: linaGLIPtin 5 MG TABS    Dose/Frequency: Take 5 mg by mouth daily,    Quantity: 30     Pharmacy: [address], [place] - [address] [facility].      [facility]   Does the patient have enough for 3 days?   [x] Yes   [] No - Send as HP to POD    [facility]   Does the patient have enough for 10 days?   [] Yes   [] No - Send as HP to POD    Reason for call:   [x] Refill   [] Prior Auth  [] Other:     Office:   [x] PCP/Provider - [facility]  Authorized By: CLAYTON Menendez    [] Specialty/Provider -     Medication: hydroCHLOROthiazide 25 mg tablet     Dose/Frequency: linh 1 tablet (25 mg total) by mouth daily    Quantity: 90 tablet    Pharmacy: [facility] [address] [place] [address] [address]    [facility]   Does the patient have enough for 3 days?   [x] Yes   [] No - Send as HP to POD    [facility]   Does the patient have enough for 10 days?   [] Yes   [] No - Send as HP to POD

## 2025-05-02 DIAGNOSIS — I10 BENIGN ESSENTIAL HYPERTENSION: ICD-10-CM

## 2025-05-02 DIAGNOSIS — E78.2 MIXED HYPERLIPIDEMIA: ICD-10-CM

## 2025-05-02 RX ORDER — ATORVASTATIN CALCIUM 20 MG/1
20 TABLET, FILM COATED ORAL DAILY
Qty: 90 TABLET | Refills: 1 | Status: SHIPPED | OUTPATIENT
Start: 2025-05-02 | End: 2025-10-29

## 2025-05-02 RX ORDER — METOPROLOL TARTRATE 25 MG/1
TABLET, FILM COATED ORAL
Qty: 90 TABLET | Refills: 1 | Status: SHIPPED | OUTPATIENT
Start: 2025-05-02

## 2025-05-02 NOTE — TELEPHONE ENCOUNTER
Reason for call:   [x] Refill   [] Prior Auth  [] Other:     Office:   [x] PCP/Provider - Farzana Moreno MD  [] Specialty/Provider -     Medication: metoprolol tartrate (LOPRESSOR) 25 mg tablet / Take 1 tablet by mouth once daily     atorvastatin (LIPITOR) 20 mg tablet / Take 1 tablet (20 mg total) by mouth daily    Pharmacy: [facility] [address] [place], [place] [address] - [address] [facility].     Local Pharmacy   Does the patient have enough for 3 days?   [x] Yes   [] No - Send as HP to POD

## 2025-05-22 DIAGNOSIS — R42 DIZZINESS: ICD-10-CM

## 2025-05-22 NOTE — TELEPHONE ENCOUNTER
Reason for call:   [x] Refill   [] Prior Auth  [] Other:     Office:   [x] PCP/Provider -   [] Specialty/Provider -     Medication: meclizine (ANTIVERT) 12.5 MG tablet     Dose/Frequency: TAKE 1 TABLET BY MOUTH EVERY 6 TO 8 HOURS AS NEEDED FOR DIZZINESS     Quantity: 90 tablet     Pharmacy: [facility] [place], [place] - [address] [facility]     Local Pharmacy   Does the patient have enough for 3 days?   [x] Yes   [] No - Send as HP to POD

## 2025-05-23 RX ORDER — MECLIZINE HCL 12.5 MG 12.5 MG/1
TABLET ORAL
Qty: 90 TABLET | Refills: 0 | Status: SHIPPED | OUTPATIENT
Start: 2025-05-23

## 2025-06-03 ENCOUNTER — OFFICE VISIT (OUTPATIENT)
Dept: CARDIOLOGY CLINIC | Facility: CLINIC | Age: 75
End: 2025-06-03
Payer: MEDICARE

## 2025-06-03 VITALS
DIASTOLIC BLOOD PRESSURE: 88 MMHG | HEIGHT: 66 IN | SYSTOLIC BLOOD PRESSURE: 114 MMHG | BODY MASS INDEX: 37.12 KG/M2 | WEIGHT: 231 LBS | HEART RATE: 87 BPM

## 2025-06-03 DIAGNOSIS — I35.0 NONRHEUMATIC AORTIC (VALVE) STENOSIS: ICD-10-CM

## 2025-06-03 DIAGNOSIS — I10 PRIMARY HYPERTENSION: Primary | ICD-10-CM

## 2025-06-03 DIAGNOSIS — E78.2 MIXED HYPERLIPIDEMIA: ICD-10-CM

## 2025-06-03 LAB
ATRIAL RATE: 87 BPM
P AXIS: 6 DEGREES
PR INTERVAL: 204 MS
QRS AXIS: -49 DEGREES
QRSD INTERVAL: 98 MS
QT INTERVAL: 382 MS
QTC INTERVAL: 459 MS
T WAVE AXIS: 16 DEGREES
VENTRICULAR RATE: 87 BPM

## 2025-06-03 PROCEDURE — 99213 OFFICE O/P EST LOW 20 MIN: CPT | Performed by: INTERNAL MEDICINE

## 2025-06-03 PROCEDURE — 93000 ELECTROCARDIOGRAM COMPLETE: CPT | Performed by: INTERNAL MEDICINE

## 2025-06-03 NOTE — PROGRESS NOTES
Cardiology Follow Up    Kale Clay  1950  [id number]  [facility] CARDIAC CATH LAB  [address] [facility] [address]  [phone]    1. Nonrheumatic aortic (valve) stenosis        2. Primary hypertension        3. Mixed hyperlipidemia            Interval History: Cardiology follow-up.  Patient continues to do well from the cardiopulmonary point of view, denies chest pain or dyspnea.  Lipids last checked alk phos 140 HDL 50, LDL 45, total use of 2027, acceptable control on omega-3 fatty acid.  No bleeding issues on aspirin therapy.  Most recent creatinine 1.1 GFR in the 60s.  Compliant with low-sodium diet, blood pressures been well-controlled today is elevated at 144/88.  Denies syncope or presyncope.    Problem List[1]  Past Medical History[2]  Social History     Socioeconomic History    Marital status: /Civil Union     Spouse name: Not on file    Number of children: Not on file    Years of education: Not on file    Highest education level: Not on file   Occupational History    Not on file   Tobacco Use    Smoking status: Never     Passive exposure: Never    Smokeless tobacco: Never   Vaping Use    Vaping status: Never Used   Substance and Sexual Activity    Alcohol use: No    Drug use: Never    Sexual activity: Not Currently   Other Topics Concern    Not on file   Social History Narrative    Not on file     Social Drivers of Health     Financial Resource Strain: Not on file   Food Insecurity: No Food Insecurity (6/19/2024)    Nursing - Inadequate Food Risk Classification     Worried About Running Out of Food in the Last Year: Never true     Ran Out of Food in the Last Year: Never true     Ran Out of Food in the Last Year: Not on file   Transportation Needs: No Transportation Needs (6/19/2024)    PRAPARE - Transportation     Lack of Transportation (Medical): No     Lack of Transportation (Non-Medical): No   Physical  Activity: Not on file   Stress: Not on file   Social Connections: Not on file   Intimate Partner Violence: Not on file   Housing Stability: Unknown (6/19/2024)    Housing Stability Vital Sign     Unable to Pay for Housing in the Last Year: No     Number of Times Moved in the Last Year: Not on file     Homeless in the Last Year: Not on file      Family History[3]  Past Surgical History[4]  Current Medications[5]  No Known Allergies    Labs:  Telephone on 01/28/2025   Component Date Value    Severity 10/09/2024 Normal     Right Eye Diabetic Retin* 10/09/2024 None     Left Eye Diabetic Retino* 10/09/2024 None    Office Visit on 01/27/2025   Component Date Value    Hemoglobin A1C 01/27/2025 7.5 (A)      Imaging: No results found.    Review of Systems:  Review of Systems   Constitutional:  Negative for activity change and fatigue.   HENT:  Negative for nosebleeds.    Respiratory:  Negative for apnea, shortness of breath, wheezing and stridor.    Cardiovascular:  Negative for chest pain, palpitations and leg swelling.   Gastrointestinal:  Negative for anal bleeding and blood in stool.   Genitourinary:  Negative for hematuria.   Neurological:  Negative for syncope.   Hematological:  Does not bruise/bleed easily.       Physical Exam:  Physical Exam  Vitals reviewed.   Constitutional:       General: He is not in acute distress.     Appearance: He is obese. He is not ill-appearing, toxic-appearing or diaphoretic.   Neck:      Vascular: No carotid bruit.     Cardiovascular:      Rate and Rhythm: Normal rate and regular rhythm.      Heart sounds: Murmur (2/4 systolic ejection murmur at the base A2 present) heard.      No friction rub. No gallop.   Pulmonary:      Effort: Pulmonary effort is normal. No respiratory distress.      Breath sounds: Normal breath sounds. No stridor. No wheezing, rhonchi or rales.     Neurological:      Mental Status: He is alert.     Psychiatric:         Mood and Affect: Mood normal.          Discussion/Summary:   Valvular heart disease, echocardiogram 2024 revealed normal left ventricular function with grade 1 diastolic dysfunction.  Trileaflet aortic valve,aortic stenosis, described as moderate with a mean gradient of 10 mmHg, I do believe is only mild..  At the present time no interventions are needed.  Chronic dyspnea is multifactorial, likely deconditioning.  He did have a stress test, pharmacological in 2018 that suggested no ischemia ejection fraction 60%.  I did suggest decreasing his aspirin dose to  81 mg daily, possibly discontinued, he prefers to continue, no bleeding issues on that regimen.  Ambulatory blood pressures been requested as well as a urinalysis to low-sodium diet and perhaps increase in exercise Program as well as weight management.        This note was completed in part utilizing [employer] direct voice recognition software.   Grammatical errors, random word insertion, spelling mistakes, and incomplete sentences may be an occasional consequence of the system secondary to software limitations, ambient noise and hardware issues. At the time of dictation, efforts were made to edit, clarify and /or correct errors.  Please read the chart carefully and recognize, using context, where substitutions have occurred.  If you have any questions or concerns about the context, text or information contained within the body of this dictation, please contact myself, the provider, for further clarification.        [1]   Patient Active Problem List  Diagnosis    Primary hypertension    Fatty liver    Mixed hyperlipidemia    Type 2 diabetes mellitus with hyperglycemia, without long-term current use of insulin (HCC)    Vitamin D deficiency    Diabetic neuropathy associated with type 2 diabetes mellitus (HCC)    Stage 3 chronic kidney disease (HCC)    Nonrheumatic aortic (valve) stenosis    Gait instability   [2]   Past Medical History:  Diagnosis Date    Balance problem 04/30/2024     Cataract, left     last assessed [id number]    Epistaxis     last assessed 16Jan2017    Exertional shortness of breath     last assessed 16jan2018    Glaucoma 07/03/2012    IFG (impaired fasting glucose)     last assessed 01Apr2016    Left leg pain 12/19/2023   [3]   Family History  Problem Relation Name Age of Onset    Stomach cancer Father      No Known Problems Mother     [4]   Past Surgical History:  Procedure Laterality Date    CATARACT EXTRACTION Left 07/01/2016    L eye cataract surgery - 7/16   [5]   Current Outpatient Medications:     aspirin 325 mg tablet, Take 1 tablet by mouth daily PCP told patient to take only half of a 325 MG tablet, Disp: , Rfl:     atorvastatin (LIPITOR) 20 mg tablet, Take 1 tablet (20 mg total) by mouth daily, Disp: 90 tablet, Rfl: 1    B Complex Vitamins (VITAMIN B COMPLEX 100 IJ), Inject 1 tablet as directed Daily at 2am, Disp: , Rfl:     Blood Glucose Monitoring Suppl (ONE TOUCH ULTRA MINI) w/Device KIT, Use see administration instructions Check your blood glucose 1 time per day as directed, Disp: 1 kit, Rfl: 0    cholecalciferol (VITAMIN D3) 1,000 units tablet, Take 1 tablet by mouth daily, Disp: , Rfl:     dorzolamide (TRUSOPT) 2 % ophthalmic solution, INSTILL 1 DROPS INTO EACH EYE TWICE DAILY, Disp: , Rfl:     glucose blood (ONE TOUCH ULTRA TEST) test strip, Test sugar 2-3 times daily, Disp: 300 each, Rfl: 3    glucose blood test strip, Use 1 each daily, Disp: 100 each, Rfl: 3    hydroCHLOROthiazide 25 mg tablet, Take 1 tablet (25 mg total) by mouth daily, Disp: 90 tablet, Rfl: 1    latanoprost (XALATAN) 0.005 % ophthalmic solution, Apply to eye, Disp: , Rfl:     ArtilleryCAN UNISTIK II LANCETS MISC, by Does not apply route 3 (three) times a day for 90 days, Disp: 300 each, Rfl: 3    linaGLIPtin 5 MG TABS, Take 5 mg by mouth daily, Disp: 30 tablet, Rfl: 5    losartan (COZAAR) 100 MG tablet, Take 0.5 tablets (50 mg total) by mouth daily, Disp: 45 tablet, Rfl: 1    meclizine  (ANTIVERT) 12.5 MG tablet, TAKE 1 TABLET BY MOUTH EVERY 6 TO 8 HOURS AS NEEDED FOR DIZZINESS, Disp: 90 tablet, Rfl: 0    metFORMIN (GLUCOPHAGE) 1000 MG tablet, Take 1 tablet by mouth twice daily, Disp: 180 tablet, Rfl: 1    metoprolol tartrate (LOPRESSOR) 25 mg tablet, Take 1 tablet by mouth once daily, Disp: 90 tablet, Rfl: 1    Multiple Vitamins-Minerals (MULTIVITAL-M) TABS, Take 1 tablet by mouth daily, Disp: , Rfl:     Omega-3 Fatty Acids (FISH OIL) 1,000 mg, Take by mouth 2 (two) times a day, Disp: , Rfl:     potassium chloride (Klor-Con M10) 10 mEq tablet, Take 1 tablet (10 mEq total) by mouth daily (Patient not taking: Reported on 4/2/2025), Disp: 90 tablet, Rfl: 0    timolol (TIMOPTIC) 0.5 % ophthalmic solution, INSTILL 1 DROP INTO EACH EYE ONCE DAILY IN THE MORNING, Disp: , Rfl:     vitamin E, tocopherol, 400 units capsule, Take by mouth daily, Disp: , Rfl:

## 2025-06-20 DIAGNOSIS — E87.6 HYPOKALEMIA: ICD-10-CM

## 2025-06-20 NOTE — TELEPHONE ENCOUNTER
Reason for call:   [x] Refill   [] Prior Auth  [] Other:     Office:   [x] PCP/Provider - CLAYTON Menendez   [] Specialty/Provider -     Medication: potassium chloride (Klor-Con M10) 10 mEq tablet     Dose/Frequency:     Take 1 tablet (10 mEq total) by mouth daily       Quantity: 90    Pharmacy: [address] [place] - [facility] [address] [facility]. [phone]     Local Pharmacy   Does the patient have enough for 3 days?   [x] Yes   [] No - Send as HP to POD    Mail Away Pharmacy   Does the patient have enough for 10 days?   [] Yes   [] No - Send as HP to POD

## 2025-06-22 RX ORDER — POTASSIUM CHLORIDE 750 MG/1
10 TABLET, EXTENDED RELEASE ORAL DAILY
Qty: 90 TABLET | Refills: 1 | Status: SHIPPED | OUTPATIENT
Start: 2025-06-22

## 2025-07-11 ENCOUNTER — HOSPITAL ENCOUNTER (OUTPATIENT)
Dept: NON INVASIVE DIAGNOSTICS | Facility: CLINIC | Age: 75
Discharge: HOME | End: 2025-07-11
Attending: INTERNAL MEDICINE
Payer: MEDICARE

## 2025-07-11 VITALS
HEART RATE: 87 BPM | HEIGHT: 66 IN | DIASTOLIC BLOOD PRESSURE: 88 MMHG | SYSTOLIC BLOOD PRESSURE: 114 MMHG | WEIGHT: 231 LBS | BODY MASS INDEX: 37.12 KG/M2

## 2025-07-11 DIAGNOSIS — I35.0 NONRHEUMATIC AORTIC (VALVE) STENOSIS: ICD-10-CM

## 2025-07-11 LAB
AORTIC ROOT: 4.2 CM
AORTIC VALVE MEAN VELOCITY: 18.9 M/S
ASCENDING AORTA: 4.2 CM
AV AREA BY CONTINUOUS VTI: 1.1 CM2
AV AREA PEAK VELOCITY: 1.1 CM2
AV LVOT MEAN GRADIENT: 1 MMHG
AV LVOT PEAK GRADIENT: 2 MMHG
AV MEAN PRESS GRAD SYS DOP V1V2: 15 MMHG
AV ORIFICE AREA US: 1.07 CM2
AV PEAK GRADIENT: 24 MMHG
AV VELOCITY RATIO: 0.31
AV VMAX SYS DOP: 2.45 M/S
BSA FOR ECHO PROCEDURE: 2.13 M2
DOP CALC AO VTI: 53.31 CM
DOP CALC LVOT AREA: 3.46 CM2
DOP CALC LVOT CARDIAC INDEX: 2.06 L/MIN/M2
DOP CALC LVOT CARDIAC OUTPUT: 4.4 L/MIN
DOP CALC LVOT DIAMETER: 2.1 CM
DOP CALC LVOT PEAK VEL VTI: 16.52 CM
DOP CALC LVOT PEAK VEL: 0.79 M/S
DOP CALC LVOT STROKE INDEX: 27.7 ML/M2
DOP CALC LVOT STROKE VOLUME: 59
E WAVE DECELERATION TIME: 248 MS
E/A RATIO: 0.66
FRACTIONAL SHORTENING: 32 (ref 28–44)
INTERVENTRICULAR SEPTUM IN DIASTOLE (PARASTERNAL SHORT AXIS VIEW): 1.7 CM
INTERVENTRICULAR SEPTUM: 1.7 CM (ref 0.6–1.1)
LAAS-AP2: 21.8 CM2
LAAS-AP4: 16.8 CM2
LEFT ATRIUM SIZE: 4.4 CM
LEFT ATRIUM VOLUME (MOD BIPLANE): 62 ML
LEFT ATRIUM VOLUME INDEX (MOD BIPLANE): 29.1 ML/M2
LEFT INTERNAL DIMENSION IN SYSTOLE: 3.4 CM (ref 2.1–4)
LEFT VENTRICULAR INTERNAL DIMENSION IN DIASTOLE: 5 CM (ref 3.5–6)
LEFT VENTRICULAR POSTERIOR WALL IN END DIASTOLE: 1.1 CM
LEFT VENTRICULAR STROKE VOLUME: 70 ML
LV EF US.2D.A4C+ESTIMATED: 55 %
LVSV (TEICH): 70 ML
MV E'TISSUE VEL-LAT: 6 CM/S
MV E'TISSUE VEL-SEP: 4 CM/S
MV PEAK A VEL: 0.92 M/S
MV PEAK E VEL: 61 CM/S
MV STENOSIS PRESSURE HALF TIME: 72 MS
MV VALVE AREA P 1/2 METHOD: 3.1
RIGHT ATRIUM AREA SYSTOLE A4C: 18.8 CM2
RIGHT VENTRICLE ID DIMENSION: 3.8 CM
SL CV LEFT ATRIUM LENGTH A2C: 5.7 CM
SL CV LV EF: 55
SL CV PED ECHO LEFT VENTRICLE DIASTOLIC VOLUME (MOD BIPLANE) 2D: 118 ML
SL CV PED ECHO LEFT VENTRICLE SYSTOLIC VOLUME (MOD BIPLANE) 2D: 48 ML
TR MAX PG: 24 MMHG
TR PEAK VELOCITY: 2.5 M/S
TRICUSPID ANNULAR PLANE SYSTOLIC EXCURSION: 1.4 CM
TRICUSPID VALVE PEAK REGURGITATION VELOCITY: 2.45 M/S

## 2025-07-11 PROCEDURE — 93306 TTE W/DOPPLER COMPLETE: CPT | Performed by: INTERNAL MEDICINE

## 2025-07-11 PROCEDURE — 93306 TTE W/DOPPLER COMPLETE: CPT

## 2025-07-21 DIAGNOSIS — E11.65 TYPE 2 DIABETES MELLITUS WITH HYPERGLYCEMIA, WITHOUT LONG-TERM CURRENT USE OF INSULIN (HCC): ICD-10-CM

## 2025-07-21 NOTE — TELEPHONE ENCOUNTER
Reason for call:   [x] Refill   [] Prior Auth  [] Other:     Office:   [x] PCP/Provider - Kathy Orozco MD        [facility]   [] Specialty/Provider -     Medication: metFORMIN (GLUCOPHAGE)     Dose/Frequency: 1,000 mg     2 times daily     Quantity: 180    Pharmacy: [id number] [place] [address] [facility]    Local Pharmacy   Does the patient have enough for 3 days?   [x] Yes   [] No - Send as HP to POD    Mail Away Pharmacy   Does the patient have enough for 10 days?   [] Yes   [] No - Send as HP to POD

## 2025-07-22 ENCOUNTER — RA CDI HCC (OUTPATIENT)
Dept: OTHER | Facility: HOSPITAL | Age: 75
End: 2025-07-22

## 2025-07-22 NOTE — PROGRESS NOTES
HCC coding opportunities          Chart Reviewed number of suggestions sent to Provider: 3     Patients Insurance   E11.36, E66.01 and E11.22  Medicare Insurance: Medicare

## 2025-07-29 ENCOUNTER — APPOINTMENT (OUTPATIENT)
Dept: LAB | Facility: CLINIC | Age: 75
End: 2025-07-29
Attending: NURSE PRACTITIONER
Payer: MEDICARE

## 2025-07-29 DIAGNOSIS — E11.65 TYPE 2 DIABETES MELLITUS WITH HYPERGLYCEMIA, WITHOUT LONG-TERM CURRENT USE OF INSULIN (HCC): ICD-10-CM

## 2025-07-29 DIAGNOSIS — E78.2 MIXED HYPERLIPIDEMIA: ICD-10-CM

## 2025-07-29 DIAGNOSIS — I10 PRIMARY HYPERTENSION: ICD-10-CM

## 2025-07-29 LAB
ALBUMIN SERPL BCG-MCNC: 4.1 G/DL (ref 3.5–5)
ALP SERPL-CCNC: 56 U/L (ref 34–104)
ALT SERPL W P-5'-P-CCNC: 23 U/L (ref 7–52)
ANION GAP SERPL CALCULATED.3IONS-SCNC: 16 MMOL/L (ref 4–13)
AST SERPL W P-5'-P-CCNC: 28 U/L (ref 13–39)
BASOPHILS # BLD AUTO: 0.07 THOUSANDS/ÂΜL (ref 0–0.1)
BASOPHILS NFR BLD AUTO: 1 % (ref 0–1)
BILIRUB SERPL-MCNC: 1.15 MG/DL (ref 0.2–1)
BUN SERPL-MCNC: 20 MG/DL (ref 5–25)
CALCIUM SERPL-MCNC: 9.7 MG/DL (ref 8.4–10.2)
CHLORIDE SERPL-SCNC: 100 MMOL/L (ref 96–108)
CHOLEST SERPL-MCNC: 153 MG/DL (ref ?–200)
CO2 SERPL-SCNC: 26 MMOL/L (ref 21–32)
CREAT SERPL-MCNC: 1.32 MG/DL (ref 0.6–1.3)
CREAT UR-MCNC: 136.2 MG/DL
EOSINOPHIL # BLD AUTO: 0.63 THOUSAND/ÂΜL (ref 0–0.61)
EOSINOPHIL NFR BLD AUTO: 7 % (ref 0–6)
ERYTHROCYTE [DISTWIDTH] IN BLOOD BY AUTOMATED COUNT: 12.8 % (ref 11.6–15.1)
GFR SERPL CREATININE-BSD FRML MDRD: 51 ML/MIN/1.73SQ M
GLUCOSE P FAST SERPL-MCNC: 153 MG/DL (ref 65–99)
HCT VFR BLD AUTO: 41.7 % (ref 36.5–49.3)
HDLC SERPL-MCNC: 41 MG/DL
HGB BLD-MCNC: 13.8 G/DL (ref 12–17)
IMM GRANULOCYTES # BLD AUTO: 0.04 THOUSAND/UL (ref 0–0.2)
IMM GRANULOCYTES NFR BLD AUTO: 0 % (ref 0–2)
LDLC SERPL CALC-MCNC: 58 MG/DL (ref 0–100)
LYMPHOCYTES # BLD AUTO: 1.35 THOUSANDS/ÂΜL (ref 0.6–4.47)
LYMPHOCYTES NFR BLD AUTO: 15 % (ref 14–44)
MCH RBC QN AUTO: 30.3 PG (ref 26.8–34.3)
MCHC RBC AUTO-ENTMCNC: 33.1 G/DL (ref 31.4–37.4)
MCV RBC AUTO: 91 FL (ref 82–98)
MICROALBUMIN UR-MCNC: 15.2 MG/L
MICROALBUMIN/CREAT 24H UR: 11 MG/G CREATININE (ref 0–30)
MONOCYTES # BLD AUTO: 0.73 THOUSAND/ÂΜL (ref 0.17–1.22)
MONOCYTES NFR BLD AUTO: 8 % (ref 4–12)
NEUTROPHILS # BLD AUTO: 6.36 THOUSANDS/ÂΜL (ref 1.85–7.62)
NEUTS SEG NFR BLD AUTO: 69 % (ref 43–75)
NONHDLC SERPL-MCNC: 112 MG/DL
NRBC BLD AUTO-RTO: 0 /100 WBCS
PLATELET # BLD AUTO: 270 THOUSANDS/UL (ref 149–390)
PMV BLD AUTO: 11.5 FL (ref 8.9–12.7)
POTASSIUM SERPL-SCNC: 4 MMOL/L (ref 3.5–5.3)
PROT SERPL-MCNC: 7.5 G/DL (ref 6.4–8.4)
RBC # BLD AUTO: 4.56 MILLION/UL (ref 3.88–5.62)
SODIUM SERPL-SCNC: 142 MMOL/L (ref 135–147)
TRIGL SERPL-MCNC: 272 MG/DL (ref ?–150)
WBC # BLD AUTO: 9.18 THOUSAND/UL (ref 4.31–10.16)

## 2025-07-29 PROCEDURE — 36415 COLL VENOUS BLD VENIPUNCTURE: CPT

## 2025-07-29 PROCEDURE — 82043 UR ALBUMIN QUANTITATIVE: CPT

## 2025-07-29 PROCEDURE — 83036 HEMOGLOBIN GLYCOSYLATED A1C: CPT

## 2025-07-29 PROCEDURE — 80061 LIPID PANEL: CPT

## 2025-07-29 PROCEDURE — 82985 ASSAY OF GLYCATED PROTEIN: CPT

## 2025-07-29 PROCEDURE — 82570 ASSAY OF URINE CREATININE: CPT

## 2025-07-29 PROCEDURE — 85025 COMPLETE CBC W/AUTO DIFF WBC: CPT

## 2025-07-29 PROCEDURE — 80053 COMPREHEN METABOLIC PANEL: CPT

## 2025-07-30 LAB
EST. AVERAGE GLUCOSE BLD GHB EST-MCNC: 154 MG/DL
HBA1C MFR BLD: 7 %

## 2025-07-31 ENCOUNTER — OFFICE VISIT (OUTPATIENT)
Dept: FAMILY MEDICINE CLINIC | Facility: CLINIC | Age: 75
End: 2025-07-31
Payer: MEDICARE

## 2025-07-31 VITALS
DIASTOLIC BLOOD PRESSURE: 84 MMHG | WEIGHT: 230.2 LBS | HEART RATE: 93 BPM | SYSTOLIC BLOOD PRESSURE: 128 MMHG | OXYGEN SATURATION: 93 % | BODY MASS INDEX: 37 KG/M2 | RESPIRATION RATE: 17 BRPM | HEIGHT: 66 IN | TEMPERATURE: 97.8 F

## 2025-07-31 DIAGNOSIS — Z00.00 MEDICARE ANNUAL WELLNESS VISIT, SUBSEQUENT: Primary | ICD-10-CM

## 2025-07-31 DIAGNOSIS — R26.81 GAIT INSTABILITY: ICD-10-CM

## 2025-07-31 DIAGNOSIS — E78.2 MIXED HYPERLIPIDEMIA: ICD-10-CM

## 2025-07-31 DIAGNOSIS — N18.31 STAGE 3A CHRONIC KIDNEY DISEASE (HCC): ICD-10-CM

## 2025-07-31 DIAGNOSIS — E11.65 TYPE 2 DIABETES MELLITUS WITH HYPERGLYCEMIA, WITHOUT LONG-TERM CURRENT USE OF INSULIN (HCC): ICD-10-CM

## 2025-07-31 DIAGNOSIS — I10 PRIMARY HYPERTENSION: ICD-10-CM

## 2025-07-31 DIAGNOSIS — I35.0 NONRHEUMATIC AORTIC (VALVE) STENOSIS: ICD-10-CM

## 2025-07-31 DIAGNOSIS — E11.42 DIABETIC POLYNEUROPATHY ASSOCIATED WITH TYPE 2 DIABETES MELLITUS (HCC): ICD-10-CM

## 2025-07-31 DIAGNOSIS — E55.9 VITAMIN D DEFICIENCY: ICD-10-CM

## 2025-07-31 PROBLEM — E66.01 MORBID OBESITY (HCC): Status: RESOLVED | Noted: 2025-07-31 | Resolved: 2025-07-31

## 2025-07-31 PROBLEM — E66.01 MORBID OBESITY (HCC): Status: ACTIVE | Noted: 2025-07-31

## 2025-07-31 LAB — FRUCTOSAMINE SERPL-SCNC: 286 UMOL/L (ref 0–285)

## 2025-07-31 PROCEDURE — G2211 COMPLEX E/M VISIT ADD ON: HCPCS | Performed by: NURSE PRACTITIONER

## 2025-07-31 PROCEDURE — 99214 OFFICE O/P EST MOD 30 MIN: CPT | Performed by: NURSE PRACTITIONER

## 2025-07-31 PROCEDURE — G0439 PPPS, SUBSEQ VISIT: HCPCS | Performed by: NURSE PRACTITIONER

## 2025-08-21 DIAGNOSIS — I10 BENIGN ESSENTIAL HYPERTENSION: ICD-10-CM

## 2025-08-21 DIAGNOSIS — R42 DIZZINESS: ICD-10-CM

## 2025-08-22 RX ORDER — LOSARTAN POTASSIUM 100 MG/1
50 TABLET ORAL DAILY
Qty: 50 TABLET | Refills: 1 | Status: SHIPPED | OUTPATIENT
Start: 2025-08-22

## 2025-08-22 RX ORDER — MECLIZINE HCL 12.5 MG 12.5 MG/1
TABLET ORAL
Qty: 90 TABLET | Refills: 0 | Status: SHIPPED | OUTPATIENT
Start: 2025-08-22

## (undated) RX ORDER — DORZOLAMIDE 20 MG/ML
1 SOLUTION/ DROPS OPHTHALMIC TWICE A DAY
Start: 2023-11-02

## (undated) RX ORDER — BRIMONIDINE TARTRATE, TIMOLOL MALEATE 2; 5 MG/ML; MG/ML: 1 SOLUTION/ DROPS OPHTHALMIC TWICE A DAY

## (undated) RX ORDER — BRIMONIDINE TARTRATE 2 MG/MG
1 SOLUTION/ DROPS OPHTHALMIC TWICE A DAY
Start: 2022-01-17